# Patient Record
Sex: MALE | Race: WHITE | NOT HISPANIC OR LATINO | Employment: OTHER | URBAN - METROPOLITAN AREA
[De-identification: names, ages, dates, MRNs, and addresses within clinical notes are randomized per-mention and may not be internally consistent; named-entity substitution may affect disease eponyms.]

---

## 2017-01-13 RX ORDER — ATORVASTATIN CALCIUM 40 MG/1
TABLET, FILM COATED ORAL
Qty: 90 TABLET | Refills: 12 | Status: SHIPPED | OUTPATIENT
Start: 2017-01-13 | End: 2017-09-22 | Stop reason: SDUPTHER

## 2017-01-13 RX ORDER — LORAZEPAM 1 MG/1
TABLET ORAL
Qty: 90 TABLET | Refills: 0 | Status: SHIPPED | OUTPATIENT
Start: 2017-01-13 | End: 2017-04-12 | Stop reason: SDUPTHER

## 2017-01-16 RX ORDER — LISINOPRIL 10 MG/1
10 TABLET ORAL DAILY
Qty: 90 TABLET | Refills: 0 | Status: SHIPPED | OUTPATIENT
Start: 2017-01-16 | End: 2017-09-22 | Stop reason: SDUPTHER

## 2017-01-16 NOTE — TELEPHONE ENCOUNTER
----- Message from Ebony Richards sent at 1/16/2017 12:32 PM CST -----  Contact: Galina/ [facility]  Refill request: lisinopril 10 MG tablet    Contact Galina [phone]    Thanks

## 2017-01-18 ENCOUNTER — TELEPHONE (OUTPATIENT)
Dept: FAMILY MEDICINE | Facility: CLINIC | Age: 82
End: 2017-01-18

## 2017-01-18 NOTE — TELEPHONE ENCOUNTER
----- Message from Tricia Anderson sent at 1/17/2017  3:54 PM CST -----  Contact: self  Pt is requesting a call back concerning a request for a script. [phone]

## 2017-02-08 ENCOUNTER — OFFICE VISIT (OUTPATIENT)
Dept: OPTOMETRY | Facility: CLINIC | Age: 82
End: 2017-02-08
Payer: MEDICARE

## 2017-02-08 DIAGNOSIS — T26.62XA: Primary | ICD-10-CM

## 2017-02-08 PROCEDURE — 99213 OFFICE O/P EST LOW 20 MIN: CPT | Mod: PBBFAC,PO | Performed by: OPTOMETRIST

## 2017-02-08 PROCEDURE — 92012 INTRM OPH EXAM EST PATIENT: CPT | Mod: S$PBB,,, | Performed by: OPTOMETRIST

## 2017-02-08 PROCEDURE — 99999 PR PBB SHADOW E&M-EST. PATIENT-LVL III: CPT | Mod: PBBFAC,,, | Performed by: OPTOMETRIST

## 2017-02-08 RX ORDER — ERYTHROMYCIN 5 MG/G
OINTMENT OPHTHALMIC 4 TIMES DAILY
Qty: 1 TUBE | Refills: 0 | Status: SHIPPED | OUTPATIENT
Start: 2017-02-08 | End: 2017-02-15

## 2017-02-08 NOTE — PROGRESS NOTES
HPI     [facility]    Pt states he got diluted bleach in his eye this morning in the OS. Sprayed   out of pressurized sprayer.  Pt states on a pain scale is about a 3 or 4   right now. Pt states he rinsed his OS with water to try to rinse it out.    Pt c/o burning and FB sensation to the OS. Pt states vision is a little   blurry in OS.        Last edited by Luc Leon, OD on 2/8/2017 12:43 PM.         Assessment /Plan     For exam results, see Encounter Report.    Chemical burn of cornea, left, initial encounter  -     erythromycin (ROMYCIN) ophthalmic ointment; Place into the left eye 4 (four) times daily.  Dispense: 1 Tube; Refill: 0  -Flushed in office with entire bottle of BSS  -Erythromycin in Office  -Erythromycin QID x 1wk    RTC 1 yr or prn

## 2017-02-08 NOTE — MR AVS SNAPSHOT
[facility]  [address]  [place] [address]  Phone: [phone]  Fax: [phone]                  Aleks Brown   2017 1:00 PM   Office Visit    Description:  Male : 1932   Provider:  Luc Leon OD   Department:  [facility]           Reason for Visit     Eye Problem           Diagnoses this Visit        Comments    Chemical burn of cornea, left, initial encounter    -  Primary            To Do List           Future Appointments        Provider Department Dept Phone    2017 1:00 PM Luc Leon OD [facility] [phone]      Goals (5 Years of Data)     None       These Medications        Disp Refills Start End    erythromycin (ROMYCIN) ophthalmic ointment 1 Tube 0 2017 2/15/2017    Place into the left eye 4 (four) times daily. - Left Eye    [place] [facility] [place] [address] [facility] #: [phone]         [facility]     [facility] On Call Nurse Care Line -  Assistance  Registered nurses in the [facility] provide clinical advisement, health education, appointment booking, and other advisory services.  Call for this free service at [phone].             Medications           Message regarding Medications     Verify the changes and/or additions to your medication regime listed below are the same as discussed with your clinician today.  If any of these changes or additions are incorrect, please notify your healthcare provider.        START taking these NEW medications        Refills    erythromycin (ROMYCIN) ophthalmic ointment 0    Sig: Place into the left eye 4 (four) times daily.    Class: Normal    Route: Left Eye           Verify that the below list of medications is an accurate representation of the medications you are currently taking.  If none reported, the list may be blank. If incorrect, please contact your healthcare provider. Carry this list with you in case  of emergency.           Current Medications     aspirin (ASPIRIN LOW DOSE) 81 MG EC tablet Take 81 mg by mouth once daily.     atorvastatin (LIPITOR) 40 MG tablet TAKE 1 TABLET BY MOUTH EVERY DAY    clotrimazole-betamethasone 1-0.05% (LOTRISONE) cream Apply topically 2 (two) times daily as needed. Apply to affected area    desonide (DESOWEN) 0.05 % cream APPLY TO SCALY AREA(S) ON FACE TWICE A DAY    guaifenesin (MUCINEX) 1,200 mg Ta12 Take 1 tablet by mouth 2 (two) times daily.    lisinopril 10 MG tablet Take 1 tablet (10 mg total) by mouth once daily.    lorazepam (ATIVAN) 1 MG tablet TAKE 1 TABLET BY MOUTH EVERY EVENING AS NEEDED    metoprolol succinate (TOPROL-XL) 100 MG 24 hr tablet TAKE 1 TABLET BY MOUTH EVERY DAY    MULTIVITAMIN ORAL Take 1 tablet by mouth once daily.     nitroGLYCERIN (NITROSTAT) 0.4 MG SL tablet Place 1 tablet under the tongue as needed.     sildenafil (VIAGRA) 100 MG tablet Take 1 tablet by mouth.    erythromycin (ROMYCIN) ophthalmic ointment Place into the left eye 4 (four) times daily.           Clinical Reference Information           Allergies as of 2/8/2017     Iodine And Iodide Containing Products      Immunizations Administered on Date of Encounter - 2/8/2017     None      [facility] Sign-Up     Activating your [facility] account is as easy as 1-2-3!     1) Visit [email or web address], select Sign Up Now, enter this activation code and your date of birth, then select Next.  [email or web address]  Expires: 3/25/2017 12:46 PM      2) Create a username and password to use when you visit [facility] in the future and select a security question in case you lose your password and select Next.    3) Enter your e-mail address and click Sign Up!    Additional Information  If you have questions, please e-mail [email or web address] or call [phone] to talk to our [facility] staff. Remember, [facility] is NOT to be used for urgent needs. For medical emergencies, dial 911.         Language Assistance Services      ATTENTION: Language assistance services are available, free of charge. Please call [phone].      ATENCIÓN: Si habla karol, tiene a murray disposición servicios gratuitos de asistencia lingüística. Llame al [phone].     CHÚ Ý: N?u b?n nói Ti?ng Vi?t, có các d?ch v? h? tr? ngôn ng? mi?n phí dành cho b?n. G?i s? [phone].         [facility] complies with applicable Federal civil rights laws and does not discriminate on the basis of race, color, national origin, age, disability, or sex.

## 2017-03-04 ENCOUNTER — HOSPITAL ENCOUNTER (EMERGENCY)
Facility: OTHER | Age: 82
Discharge: HOME | End: 2017-03-04
Attending: EMERGENCY MEDICINE
Payer: MEDICARE

## 2017-03-04 VITALS
HEART RATE: 58 BPM | SYSTOLIC BLOOD PRESSURE: 157 MMHG | DIASTOLIC BLOOD PRESSURE: 64 MMHG | HEIGHT: 72 IN | TEMPERATURE: 97 F | OXYGEN SATURATION: 99 % | BODY MASS INDEX: 30.61 KG/M2 | WEIGHT: 226 LBS | RESPIRATION RATE: 20 BRPM

## 2017-03-04 DIAGNOSIS — L29.9 ITCHING: Primary | ICD-10-CM

## 2017-03-04 PROCEDURE — 99283 EMERGENCY DEPT VISIT LOW MDM: CPT

## 2017-03-04 NOTE — ED NOTES
Appearance:  Pt awake, alert & oriented to person, place & time.  Pt in no acute distress at present time.  Skin:  Skin warm, dry & intact.  Mucous membranes moist.  Skin turgor normal.   No redness or tenderness to chest wall.   Respiratory:  Respirations even, non-labored.

## 2017-03-04 NOTE — ED PROVIDER NOTES
Encounter Date: 3/4/2017       History     Chief Complaint   Patient presents with    Wound Check     Review of patient's allergies indicates:   Allergen Reactions    Iodine and iodide containing products Anaphylaxis     HPI Comments: 84-year-old male with a history of having cardiac bypass surgery in 1988 with subsequent episodes of cellulitis to his chest, reports itching to his anterior chest last night.  He reports he put Lotrisone on it and it is improved but he wanted to get checked out due to his several episodes of saline as in the past.  No fever, no tenderness, no redness, no injury.    Patient is a 84 y.o. male presenting with the following complaint: rash. The history is provided by the patient.   Rash    This is a new problem. The current episode started yesterday. The problem has been unchanged. The problem is associated with nothing. The rash is present on the trunk.     Past Medical History:   Diagnosis Date    Allergy     seasonal    Arthritis     Basal cell carcinoma 10 years    right ear     CAD (coronary artery disease) 11/29/2012    Hyperlipidemia     Hypertension     Joint pain      Past Surgical History:   Procedure Laterality Date    CATARACT EXTRACTION      CORONARY ARTERY BYPASS GRAFT       Family History   Problem Relation Age of Onset    Melanoma Neg Hx     Skin cancer Neg Hx     Psoriasis Neg Hx     Lupus Neg Hx     Eczema Neg Hx     Acne Neg Hx     Glaucoma Neg Hx     Macular degeneration Neg Hx      Social History   Substance Use Topics    Smoking status: Former Smoker    Smokeless tobacco: Never Used    Alcohol use Yes      Comment: Red wine daily     Review of Systems   Constitutional: Negative for chills and fever.   Skin: Positive for rash. Negative for color change and wound.       Physical Exam   Initial Vitals   BP Pulse Resp Temp SpO2   03/04/17 1008 03/04/17 1008 03/04/17 1008 03/04/17 1008 03/04/17 1008   157/64 58 20 97.2 °F (36.2 °C) 99 %     Physical  Exam    Nursing note and vitals reviewed.  Constitutional: He appears well-developed and well-nourished. He is cooperative.   HENT:   Head: Normocephalic and atraumatic.   Cardiovascular: Normal rate, regular rhythm and normal heart sounds.   Pulmonary/Chest: Effort normal and breath sounds normal. He exhibits no tenderness, no bony tenderness, no crepitus, no edema and no swelling.   Neurological: He is alert.   Skin: Skin is warm and dry. Rash noted.   Past erythematous macules to the anterior chest.  Several keratosis lesions to the anterior chest and neck.  No evidence of cellulitis, induration, tenderness to the skin or chest wall.         ED Course   Procedures  Labs Reviewed - No data to display            at this time, the patient has no evidence of cellulitis or any other type of skin infection to the chest wall.  I advised him to check his temperature twice a day and to look at the lesion in the mirror at least twice daily for changes in the follow-up with his primary care if any worsening of condition occurs.                 ED Course     Clinical Impression:   The encounter diagnosis was Itching.    Disposition:   Disposition: Discharged  Condition: Stable       Corin Hollingsworth MD  03/04/17 7486

## 2017-03-04 NOTE — ED AVS SNAPSHOT
[facility] EMERGENCY DEPARTMENT  [address] [place] [address]               Aleks Wilksx   3/4/2017 10:03 AM   ED    Description:  Male : 1932   Department:  [facility] Emergency Department           Your Care was Coordinated By:     Provider Role From To    Corin Hollingsworth MD Attending Provider 17 1122 --      Reason for Visit     Wound Check           Diagnoses this Visit        Comments    Itching    -  Primary       ED Disposition     ED Disposition Condition Comment    Discharge             To Do List           Follow-up Information     Follow up with Roman Dejesus Jr, MD. Schedule an appointment as soon as possible for a visit in 3 days.    Specialty:  Internal Medicine    Why:  For recheck if symptoms fail to improve or resolve    Contact information:    [place] [address]  [phone]        [facility]     [facility] Care [facility] -  Assistance  Registered nurses in the [facility] provide clinical advisement, health education, appointment booking, and other advisory services.  Call for this free service at [phone].             Medications           Message regarding Medications     Verify the changes and/or additions to your medication regime listed below are the same as discussed with your clinician today.  If any of these changes or additions are incorrect, please notify your healthcare provider.             Verify that the below list of medications is an accurate representation of the medications you are currently taking.  If none reported, the list may be blank. If incorrect, please contact your healthcare provider. Carry this list with you in case of emergency.           Current Medications     aspirin (ASPIRIN LOW DOSE) 81 MG EC tablet Take 81 mg by mouth once daily.     atorvastatin (LIPITOR) 40 MG tablet TAKE 1 TABLET BY MOUTH EVERY DAY    clotrimazole-betamethasone 1-0.05% (LOTRISONE) cream Apply topically 2  (two) times daily as needed. Apply to affected area    desonide (DESOWEN) 0.05 % cream APPLY TO SCALY AREA(S) ON FACE TWICE A DAY    guaifenesin (MUCINEX) 1,200 mg Ta12 Take 1 tablet by mouth 2 (two) times daily.    lisinopril 10 MG tablet Take 1 tablet (10 mg total) by mouth once daily.    lorazepam (ATIVAN) 1 MG tablet TAKE 1 TABLET BY MOUTH EVERY EVENING AS NEEDED    metoprolol succinate (TOPROL-XL) 100 MG 24 hr tablet TAKE 1 TABLET BY MOUTH EVERY DAY    MULTIVITAMIN ORAL Take 1 tablet by mouth once daily.     nitroGLYCERIN (NITROSTAT) 0.4 MG SL tablet Place 1 tablet under the tongue as needed.     sildenafil (VIAGRA) 100 MG tablet Take 1 tablet by mouth.           Clinical Reference Information           Your Vitals Were     BP Pulse Temp Resp Height Weight    157/64 58 97.2 °F (36.2 °C) (Temporal) 20 6' (1.829 m) 102.5 kg (226 lb)    SpO2 BMI             99% 30.65 kg/m2         Allergies as of 3/4/2017        Reactions    Iodine And Iodide Containing Products Anaphylaxis      Immunizations Administered on Date of Encounter - 3/4/2017     None      ED Micro, Lab, POCT     None      ED Imaging Orders     None        Discharge Instructions       You may continue to use the Lotrisone to area twice daily as needed.        Discharge References/Attachments     CELLULITIS, DISCHARGE INSTRUCTIONS FOR (ENGLISH)      [facility] Sign-Up     Activating your [facility] account is as easy as 1-2-3!     1) Visit [email or web address], select Sign Up Now, enter this activation code and your date of birth, then select Next.  [email or web address]  Expires: 3/25/2017 12:46 PM      2) Create a username and password to use when you visit [facility] in the future and select a security question in case you lose your password and select Next.    3) Enter your e-mail address and click Sign Up!    Additional Information  If you have questions, please e-mail [email or web address] or call [phone] to talk to our [facility] staff. Remember, [facility]  is NOT to be used for urgent needs. For medical emergencies, dial 911.         Smoking Cessation     If you would like to quit smoking:   You may be eligible for free services if you are a [place] resident and started smoking cigarettes before September 1, 1988.  Call the Smoking Cessation Trust (SCT) toll free at (282) 897-0740 or (217) 003-1155.   Call 1-800-QUIT-NOW if you do not meet the above criteria.             [facility] Emergency Department complies with applicable Federal civil rights laws and does not discriminate on the basis of race, color, national origin, age, disability, or sex.        Language Assistance Services     ATTENTION: Language assistance services are available, free of charge. Please call [phone].      ATENCIÓN: Si habla karol, tiene a murray disposición servicios gratuitos de asistencia lingüística. Llame al [phone].     CHÚ Ý: N?u b?n nói Ti?ng Vi?t, có các d?ch v? h? tr? ngôn ng? mi?n phí dành cho b?n. G?i s? [phone].

## 2017-04-12 RX ORDER — METOPROLOL SUCCINATE 100 MG/1
TABLET, EXTENDED RELEASE ORAL
Qty: 90 TABLET | Refills: 1 | Status: SHIPPED | OUTPATIENT
Start: 2017-04-12 | End: 2017-09-22 | Stop reason: SDUPTHER

## 2017-04-12 RX ORDER — LORAZEPAM 1 MG/1
TABLET ORAL
Qty: 90 TABLET | Refills: 1 | Status: SHIPPED | OUTPATIENT
Start: 2017-04-12 | End: 2017-09-22

## 2017-06-01 ENCOUNTER — OFFICE VISIT (OUTPATIENT)
Dept: FAMILY MEDICINE | Facility: CLINIC | Age: 82
End: 2017-06-01
Payer: MEDICARE

## 2017-06-01 VITALS
HEART RATE: 61 BPM | TEMPERATURE: 98 F | DIASTOLIC BLOOD PRESSURE: 62 MMHG | SYSTOLIC BLOOD PRESSURE: 142 MMHG | HEIGHT: 72 IN | OXYGEN SATURATION: 95 % | BODY MASS INDEX: 31.32 KG/M2 | WEIGHT: 231.25 LBS

## 2017-06-01 DIAGNOSIS — M25.511 ACUTE PAIN OF RIGHT SHOULDER: Primary | ICD-10-CM

## 2017-06-01 PROCEDURE — 99999 PR PBB SHADOW E&M-EST. PATIENT-LVL III: CPT | Mod: PBBFAC,,, | Performed by: NURSE PRACTITIONER

## 2017-06-01 PROCEDURE — 99213 OFFICE O/P EST LOW 20 MIN: CPT | Mod: PBBFAC,PO | Performed by: NURSE PRACTITIONER

## 2017-06-01 PROCEDURE — 99213 OFFICE O/P EST LOW 20 MIN: CPT | Mod: S$PBB,,, | Performed by: NURSE PRACTITIONER

## 2017-06-01 RX ORDER — DESONIDE 0.5 MG/G
CREAM TOPICAL
Qty: 60 G | Refills: 11 | Status: SHIPPED | OUTPATIENT
Start: 2017-06-01 | End: 2018-11-05 | Stop reason: SDUPTHER

## 2017-06-01 RX ORDER — CLOTRIMAZOLE AND BETAMETHASONE DIPROPIONATE 10; .64 MG/G; MG/G
CREAM TOPICAL 2 TIMES DAILY PRN
Qty: 45 G | Refills: 11 | Status: SHIPPED | OUTPATIENT
Start: 2017-06-01 | End: 2018-11-05 | Stop reason: SDUPTHER

## 2017-06-01 RX ORDER — NAPROXEN 375 MG/1
375 TABLET ORAL 2 TIMES DAILY WITH MEALS
Qty: 60 TABLET | Refills: 0 | Status: SHIPPED | OUTPATIENT
Start: 2017-06-01 | End: 2017-07-01

## 2017-06-01 RX ORDER — TIZANIDINE 2 MG/1
4 TABLET ORAL EVERY 6 HOURS PRN
Qty: 60 TABLET | Refills: 0 | Status: SHIPPED | OUTPATIENT
Start: 2017-06-01 | End: 2017-06-11

## 2017-06-01 NOTE — PROGRESS NOTES
"This dictation has been generated using Dragon Dictation some phonetic errors may occur.     Aleks was seen today for shoulder pain.    Diagnoses and all orders for this visit:    Acute pain of right shoulder    Other orders  -     naproxen (NAPROSYN) 375 MG tablet; Take 1 tablet (375 mg total) by mouth 2 (two) times daily with meals.  -     tizanidine (ZANAFLEX) 2 MG tablet; Take 2 tablets (4 mg total) by mouth every 6 (six) hours as needed.  -     desonide (DESOWEN) 0.05 % cream; APPLY TO SCALY AREA(S) ON FACE TWICE A DAY  -     clotrimazole-betamethasone 1-0.05% (LOTRISONE) cream; Apply topically 2 (two) times daily as needed. Apply to affected area      Right shoulder pain.  Suspect muscle involvement only.  Doubtful rotator cuff tear.  Conservative therapy with anti-inflammatory medicine and muscle relaxer as above.  Rash to face requests refill    Return if symptoms worsen or fail to improve.      ________________________________________________________________  ________________________________________________________________        Chief Complaint   Patient presents with    Shoulder Pain     History of present illness  This 84 y.o. presents today for complaint of right shoulder pain.  Symptoms have been present for 3 days.  He notes sudden onset.  Patient denies history of injury.  He notes elevation of the arm exacerbates the pain.  Patient indicates posterior shoulder.  He is right-handed.  He denies any numbness in the hand.  He denies excessive overhead work.  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  Review of systems  No fever or chills  No rash  Denies neck pain    Past medical and social history reviewed.  Patient due to me.  Follows with [facility].  Takes care of his wife who has had a stroke but does not do any lifting.    Past Medical History:   Diagnosis Date    Allergy     " seasonal    Arthritis     Basal cell carcinoma 10 years    right ear     CAD (coronary artery disease) 11/29/2012    Hyperlipidemia     Hypertension     Joint pain        Past Surgical History:   Procedure Laterality Date    CATARACT EXTRACTION      CORONARY ARTERY BYPASS GRAFT         Family History   Problem Relation Age of Onset    Melanoma Neg Hx     Skin cancer Neg Hx     Psoriasis Neg Hx     Lupus Neg Hx     Eczema Neg Hx     Acne Neg Hx     Glaucoma Neg Hx     Macular degeneration Neg Hx        Social History     Social History    Marital status:      Spouse name: N/A    Number of children: N/A    Years of education: N/A     Occupational History    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  .  Six kids. [place] [employer] [place]  [employer]         Social History Main Topics    Smoking status: Former Smoker    Smokeless tobacco: Never Used    Alcohol use Yes      Comment: Red wine daily    Drug use: No    Sexual activity: Not Asked     Other Topics Concern    None     Social History Narrative     x 60 yr.   [employer] [place]         Current Outpatient Prescriptions   Medication Sig Dispense Refill    aspirin (ASPIRIN LOW DOSE) 81 MG EC tablet Take 81 mg by mouth once daily.       atorvastatin (LIPITOR) 40 MG tablet TAKE 1 TABLET BY MOUTH EVERY DAY 90 tablet 12    clotrimazole-betamethasone 1-0.05% (LOTRISONE) cream Apply topically 2 (two) times daily as needed. Apply to affected area 45 g 11    desonide (DESOWEN) 0.05 % cream APPLY TO SCALY AREA(S) ON FACE TWICE A DAY 60 g 11    guaifenesin (MUCINEX) 1,200 mg Ta12 Take 1 tablet by mouth 2 (two) times daily. 14 tablet 0    lisinopril 10 MG tablet Take 1 tablet (10 mg total) by mouth once daily. 90 tablet 0    lorazepam (ATIVAN) 1 MG tablet TAKE 1 TABLET BY MOUTH EVERY EVENING AS NEEDED 90 tablet 1    metoprolol succinate (TOPROL-XL) 100 MG 24 hr tablet TAKE 1 TABLET BY MOUTH EVERY DAY 90 tablet 1    MULTIVITAMIN ORAL Take  1 tablet by mouth once daily.       nitroGLYCERIN (NITROSTAT) 0.4 MG SL tablet Place 1 tablet under the tongue as needed.       sildenafil (VIAGRA) 100 MG tablet Take 1 tablet by mouth.      naproxen (NAPROSYN) 375 MG tablet Take 1 tablet (375 mg total) by mouth 2 (two) times daily with meals. 60 tablet 0    tizanidine (ZANAFLEX) 2 MG tablet Take 2 tablets (4 mg total) by mouth every 6 (six) hours as needed. 60 tablet 0     No current facility-administered medications for this visit.        Review of patient's allergies indicates:   Allergen Reactions    Iodine and iodide containing products Anaphylaxis       Physical examination  Vitals Reviewed  Gen. Well-dressed well-nourished no apparent distress  Skin warm dry and intact.  No rashes noted.  HEENT.  TM intact bilateral with normal light reflex.  No mastoid tenderness during percussion.  Nares patent bilateral.  Pharynx is unremarkable.  No maxillary or frontal sinus tenderness when percussed.    Neck is supple without adenopathy  Chest.  Respirations are even unlabored.  Lungs are clear to auscultation.  Cardiac regular rate and rhythm.  No chest wall adenopathy noted.  Neuro. Awake alert oriented x4.  Normal judgment and cognition noted.  Extremities no clubbing cyanosis or edema noted.  Right shoulder decreased range of motion.  Patient has pain with elevation.  He is able to internally and externally rotate the shoulder without pain.  He doesn't have any lateral or anterior shoulder pain.  He does have palpable spasms of the supraspinatus and infraspinatus muscles.  I don't appreciate any bursa tenderness.    Call or return to clinic prn if these symptoms worsen or fail to improve as anticipated.

## 2017-06-09 ENCOUNTER — OFFICE VISIT (OUTPATIENT)
Dept: DERMATOLOGY | Facility: CLINIC | Age: 82
End: 2017-06-09
Payer: MEDICARE

## 2017-06-09 DIAGNOSIS — L57.0 AK (ACTINIC KERATOSIS): ICD-10-CM

## 2017-06-09 DIAGNOSIS — L82.1 SK (SEBORRHEIC KERATOSIS): ICD-10-CM

## 2017-06-09 DIAGNOSIS — Z85.828 HISTORY OF NONMELANOMA SKIN CANCER: ICD-10-CM

## 2017-06-09 DIAGNOSIS — R23.8 VENOUS LAKE: ICD-10-CM

## 2017-06-09 DIAGNOSIS — D48.9 NEOPLASM OF UNCERTAIN BEHAVIOR: Primary | ICD-10-CM

## 2017-06-09 PROCEDURE — 17000 DESTRUCT PREMALG LESION: CPT | Mod: S$PBB,,, | Performed by: DERMATOLOGY

## 2017-06-09 PROCEDURE — 88342 IMHCHEM/IMCYTCHM 1ST ANTB: CPT | Mod: 26,,, | Performed by: PATHOLOGY

## 2017-06-09 PROCEDURE — 1126F AMNT PAIN NOTED NONE PRSNT: CPT | Mod: ,,, | Performed by: DERMATOLOGY

## 2017-06-09 PROCEDURE — 17003 DESTRUCT PREMALG LES 2-14: CPT | Mod: PBBFAC | Performed by: DERMATOLOGY

## 2017-06-09 PROCEDURE — 99213 OFFICE O/P EST LOW 20 MIN: CPT | Mod: 25,S$PBB,, | Performed by: DERMATOLOGY

## 2017-06-09 PROCEDURE — 11100 PR BIOPSY OF SKIN LESION: CPT | Mod: 59,S$PBB,, | Performed by: DERMATOLOGY

## 2017-06-09 PROCEDURE — 17003 DESTRUCT PREMALG LES 2-14: CPT | Mod: S$PBB,,, | Performed by: DERMATOLOGY

## 2017-06-09 PROCEDURE — 88341 IMHCHEM/IMCYTCHM EA ADD ANTB: CPT | Mod: 26,,, | Performed by: PATHOLOGY

## 2017-06-09 PROCEDURE — 88313 SPECIAL STAINS GROUP 2: CPT | Mod: 26,,, | Performed by: PATHOLOGY

## 2017-06-09 PROCEDURE — 99212 OFFICE O/P EST SF 10 MIN: CPT | Mod: PBBFAC,25 | Performed by: DERMATOLOGY

## 2017-06-09 PROCEDURE — 88305 TISSUE EXAM BY PATHOLOGIST: CPT | Performed by: PATHOLOGY

## 2017-06-09 PROCEDURE — 99999 PR PBB SHADOW E&M-EST. PATIENT-LVL II: CPT | Mod: PBBFAC,,, | Performed by: DERMATOLOGY

## 2017-06-09 PROCEDURE — 17000 DESTRUCT PREMALG LESION: CPT | Mod: PBBFAC | Performed by: DERMATOLOGY

## 2017-06-09 PROCEDURE — 11100 PR BIOPSY OF SKIN LESION: CPT | Mod: 59,PBBFAC | Performed by: DERMATOLOGY

## 2017-06-09 PROCEDURE — 1159F MED LIST DOCD IN RCRD: CPT | Mod: ,,, | Performed by: DERMATOLOGY

## 2017-06-09 NOTE — PATIENT INSTRUCTIONS
Shave Biopsy Wound Care    Your doctor has performed a shave biopsy today.  A band aid and vaseline ointment has been placed over the site.  This should remain in place for 24 hours.  It is recommended that you keep the area dry for the first 24 hours.  After 24 hours, you may remove the band aid and wash the area with warm soap and water and apply Vaseline jelly.  Many patients prefer to use Neosporin or Bacitracin ointment.  This is acceptable; however, know that you can develop an allergy to this medication even if you have used it safely for years.  It is important to keep the area moist.  Letting it dry out and get air slows healing time, and will worsen the scar.  Band aid is optional after first 24 hours.      If you notice increasing redness, tenderness, pain, or yellow drainage at the biopsy site, please notify your doctor.  These are signs of an infection.    If your biopsy site is bleeding, apply firm pressure for 15 minutes straight.  Repeat for another 15 minutes, if it is still bleeding.   If the surgical site continues to bleed, then please contact your doctor.      [facility] [place] [address]/ [phone] FAX/ [email or web address]      CRYOSURGERY      Your doctor has used a method called cryosurgery to treat your skin condition. Cryosurgery refers to the use of very cold substances to treat a variety of skin conditions such as warts, pre-skin cancers, molluscum contagiosum, sun spots, and several benign growths. The substance we use in cryosurgery is liquid nitrogen and is so cold (-195 degrees Celsius) that is burns when administered.     Following treatment in the office, the skin may immediately burn and become red. You may find the area around the lesion is affected as well. It is sometimes necessary to treat not only the lesion, but a small area of the surrounding normal skin to achieve a good response.     A blister, and even a blood filled blister, may form  after treatment.   This is a normal response. If the blister is painful, it is acceptable to sterilize a needle and with rubbing alcohol and gently pop the blister. It is important that you gently wash the area with soap and warm water as the blister fluid may contain wart virus if a wart was treated. Do no remove the roof of the blister.     The area treated can take anywhere from 1-3 weeks to heal. Healing time depends on the kind skin lesion treated, the location, and how aggressively the lesion was treated. It is recommended that the areas treated are covered with Vaseline or bacitracin ointment and a band-aid. If a band-aid is not practical, just ointment applied several times per day will do. Keeping these areas moist will speed the healing time.    Treatment with liquid nitrogen can leave a scar. In dark skin, it may be a light or dark scar, in light skin it may be a white or pink scar. These will generally fade with time, but may never go away completely.     If you have any concerns after your treatment, please feel free to call the office.       [facility] [place] [address]/ [phone] FAX/ [email or web address]

## 2017-06-09 NOTE — PROGRESS NOTES
Subjective:       Patient ID:  Aleks Brown is a 84 y.o. male who presents for   Chief Complaint   Patient presents with    Skin Check     UBSE    Lesion     left ear     History of Present Illness: The patient presents for follow up of skin check.    The patient was last seen on: 11/28/2016 for cryosurgery to actinic keratoses which have resolved.   This is a high risk patient here to check for the development of new lesions.    Other skin complaints: new lesion to left ear      Patient complains of lesion(s)  Location: left ear  Duration: 2month  Symptoms: bleeding and scales  Relieving factors/Previous treatments: none        Lesion         Review of Systems   Skin: Positive for wears hat. Negative for daily sunscreen use, activity-related sunscreen use and recent sunburn.   Hematologic/Lymphatic: Bruises/bleeds easily (on asa).        Objective:    Physical Exam   Constitutional: He appears well-developed and well-nourished. No distress.   Neurological: He is alert and oriented to person, place, and time. He is not disoriented.   Psychiatric: He has a normal mood and affect.   Skin:   Areas Examined (abnormalities noted in diagram):   Scalp / Hair Palpated and Inspected  Head / Face Inspection Performed  Neck Inspection Performed  Chest / Axilla Inspection Performed  Back Inspection Performed  RUE Inspected  LUE Inspection Performed                   Diagram Legend     Erythematous scaling macule/papule c/w actinic keratosis       Vascular papule c/w angioma      Pigmented verrucoid papule/plaque c/w seborrheic keratosis      Yellow umbilicated papule c/w sebaceous hyperplasia      Irregularly shaped tan macule c/w lentigo     1-2 mm smooth white papules consistent with Milia      Movable subcutaneous cyst with punctum c/w epidermal inclusion cyst      Subcutaneous movable cyst c/w pilar cyst      Firm pink to brown papule c/w dermatofibroma      Pedunculated fleshy papule(s) c/w skin tag(s)       Evenly pigmented macule c/w junctional nevus     Mildly variegated pigmented, slightly irregular-bordered macule c/w mildly atypical nevus      Flesh colored to evenly pigmented papule c/w intradermal nevus       Pink pearly papule/plaque c/w basal cell carcinoma      Erythematous hyperkeratotic cursted plaque c/w SCC      Surgical scar with no sign of skin cancer recurrence      Open and closed comedones      Inflammatory papules and pustules      Verrucoid papule consistent consistent with wart     Erythematous eczematous patches and plaques     Dystrophic onycholytic nail with subungual debris c/w onychomycosis     Umbilicated papule    Erythematous-base heme-crusted tan verrucoid plaque consistent with inflamed seborrheic keratosis     Erythematous Silvery Scaling Plaque c/w Psoriasis     See annotation          Assessment / Plan:      Pathology Orders:      Normal Orders This Visit    Tissue Specimen To Pathology, Dermatology     Questions:    Directional Terms:  Other(comment)    Clinical information:  r/o scc vs other    Specific Site:  right cheek        Neoplasm of uncertain behavior  -     Tissue Specimen To Pathology, Dermatology  Shave biopsy procedure note:    Shave biopsy performed after verbal consent including risk of infection, scar, recurrence, need for additional treatment of site. Area prepped with alcohol, anesthetized with approximately 1.0cc of 1% lidocaine with epinephrine. Lesional tissue shaved with razor blade. Hemostasis achieved with application of aluminum chloride followed by hyfrecation. No complications. Dressing applied. Wound care explained.    If biopsy positive, schedule procedure for definitive excision.     AK (actinic keratosis)  Cryosurgery Procedure Note    Verbal consent from the patient is obtained and the patient is aware of the precancerous quality and need for treatment of these lesions. Liquid nitrogen cryosurgery is applied to the 5 actinic keratoses, as detailed in  the physical exam, to produce a freeze injury. The patient is aware that blisters may form and is instructed on wound care with gentle cleansing and use of vaseline ointment to keep moist until healed. The patient is supplied a handout on cryosurgery and is instructed to call if lesions do not completely resolve.      SK (seborrheic keratosis)  These are benign inherited growths without a malignant potential. Reassurance given to patient. No treatment is necessary.       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    Upper body skin examination performed today including at least 6 points as noted in physical examination. Suspicious lesions noted.               Return in about 6 months (around 12/9/2017).

## 2017-06-20 ENCOUNTER — INITIAL CONSULT (OUTPATIENT)
Dept: DERMATOLOGY | Facility: CLINIC | Age: 82
End: 2017-06-20
Payer: MEDICARE

## 2017-06-20 VITALS
HEART RATE: 67 BPM | DIASTOLIC BLOOD PRESSURE: 71 MMHG | WEIGHT: 231 LBS | HEIGHT: 72 IN | SYSTOLIC BLOOD PRESSURE: 148 MMHG | BODY MASS INDEX: 31.29 KG/M2

## 2017-06-20 DIAGNOSIS — C44.329 SQUAMOUS CELL CARCINOMA OF SKIN OF RIGHT CHEEK: Primary | ICD-10-CM

## 2017-06-20 PROCEDURE — 1159F MED LIST DOCD IN RCRD: CPT | Mod: ,,, | Performed by: DERMATOLOGY

## 2017-06-20 PROCEDURE — 99213 OFFICE O/P EST LOW 20 MIN: CPT | Mod: PBBFAC | Performed by: DERMATOLOGY

## 2017-06-20 PROCEDURE — 99214 OFFICE O/P EST MOD 30 MIN: CPT | Mod: S$PBB,,, | Performed by: DERMATOLOGY

## 2017-06-20 PROCEDURE — 1126F AMNT PAIN NOTED NONE PRSNT: CPT | Mod: ,,, | Performed by: DERMATOLOGY

## 2017-06-20 PROCEDURE — 99999 PR PBB SHADOW E&M-EST. PATIENT-LVL III: CPT | Mod: PBBFAC,,, | Performed by: DERMATOLOGY

## 2017-06-20 RX ORDER — VITAMIN E 268 MG
400 CAPSULE ORAL DAILY
COMMUNITY
End: 2018-10-02 | Stop reason: ALTCHOICE

## 2017-06-20 NOTE — PROGRESS NOTES
REFERRING MD:  Gillian Williamson M.D.    CHIEF COMPLAINT:  New patient being consulted for Mohs' surgery evaluation.    HISTORY OF PRESENT ILLNESS:  84 y.o. male presents with a 6 month(s) history of growth on the R cheek.    Negative for scabbing.  Positive for crusting.  Negative for bleeding.  Negative for itching.    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    No prior treatment.    Pacemaker: No  Defibrillator: No  Artificial joints: No  Artificial heart valves: No    PAST MEDICAL HISTORY:  Past Medical History:   Diagnosis Date    Allergy     seasonal    Arthritis     Basal cell carcinoma 10 years    right ear     CAD (coronary artery disease) 11/29/2012    Hyperlipidemia     Hypertension     Joint pain     Squamous cell carcinoma        PAST SURGICAL HISTORY:  Past Surgical History:   Procedure Laterality Date    CATARACT EXTRACTION      CORONARY ARTERY BYPASS GRAFT          SOCIAL HISTORY:  Dependencies:  former smoker    PERTINENT MEDICATIONS:  See medications list.  aspirin and vitamin E    ALLERGIES:  Iodine and iodide containing products    ROS:  Skin: See HPI  Constitutional: No fatigue, fever, malaise, weight loss, or night sweats.  Cardiovascular: No chest pain, palpitations, or edema.  Respiratory: No coughing, wheezing, SOB, or sputum production.    Physical Exam   HENT:   Head:             General: Mood and affect normal. Alert and orient X3. Normal appearance.  Head/Face: R inferior preauricular cheek with a 4 x 4 mm pink crusted papule located 2 cm anteriorly from the right inferior lobe attachment and 0.5 cm superiorly .  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    PLAN:  The diagnosis and the pathology report were discussed in detail with the patient. Treatment options were reviewed, including Mohs Micrographic Surgery, radiation, topical therapy, and standard excision.  After careful review of patient's history and physical exam, and after discussion of treatment options, the decision was made to perform Mohs micrographic surgery.    Scheduled patient for Mohs Micrographic Surgery. Risks, benefits, and alternatives of Mohs' surgery discussed with the patient. Discussed repair options including complex closure, skin flap, skin graft and second intention healing with the patient. Pre-operative instructions provided to the patient. Okay to stay on aspirin. Stop vitamin E a week prior to procedure.

## 2017-06-26 ENCOUNTER — PROCEDURE VISIT (OUTPATIENT)
Dept: DERMATOLOGY | Facility: CLINIC | Age: 82
End: 2017-06-26
Payer: MEDICARE

## 2017-06-26 VITALS
WEIGHT: 213 LBS | HEART RATE: 52 BPM | HEIGHT: 72 IN | SYSTOLIC BLOOD PRESSURE: 156 MMHG | DIASTOLIC BLOOD PRESSURE: 73 MMHG | BODY MASS INDEX: 28.85 KG/M2

## 2017-06-26 DIAGNOSIS — C44.329 SQUAMOUS CELL CARCINOMA OF SKIN OF RIGHT CHEEK: Primary | ICD-10-CM

## 2017-06-26 PROCEDURE — 13132 CMPLX RPR F/C/C/M/N/AX/G/H/F: CPT | Mod: 51,S$PBB,, | Performed by: DERMATOLOGY

## 2017-06-26 PROCEDURE — 17311 MOHS 1 STAGE H/N/HF/G: CPT | Mod: S$PBB,,, | Performed by: DERMATOLOGY

## 2017-06-26 PROCEDURE — 17311 MOHS 1 STAGE H/N/HF/G: CPT | Mod: PBBFAC | Performed by: DERMATOLOGY

## 2017-06-26 PROCEDURE — 13132 CMPLX RPR F/C/C/M/N/AX/G/H/F: CPT | Mod: PBBFAC | Performed by: DERMATOLOGY

## 2017-06-26 PROCEDURE — 99499 UNLISTED E&M SERVICE: CPT | Mod: S$PBB,,, | Performed by: DERMATOLOGY

## 2017-06-26 NOTE — PROGRESS NOTES
PROCEDURE: Mohs' Micrographic Surgery    INDICATION: Location in mask areas of face including central face, nose, eyelids, eyebrows, lips, chin, preauricular, temple, and ear. Biopsy-proven skin cancer of cosmetically and functionally important areas, including head, neck, genital, hand, foot, or areas known for having difficulty in healing, such as the lower anterior legs. Tumor with ill-defined borders.    REFERRING MD: Gillian Williamson M.D.    CASE NUMBER:     ANESTHETIC: 3 cc 0.5% Lidocaine with Epi 1:200,000 mixed 1:1 with 0.5% Bupivacaine    SURGICAL PREP: Hibiclens    SURGEON: Magnus Rashid MD    ASSISTANTS: Valeria Young PA-C and Kacie Dickson, Surg Tech    PREOPERATIVE DIAGNOSIS: squamous cell carcinoma    POSTOPERATIVE DIAGNOSIS: squamous cell carcinoma    PATHOLOGIC DIAGNOSIS: squamous cell carcinoma    HISTOLOGY OF SPECIMENS IN FIRST STAGE:   Tumor Type: No tumor seen.    STAGES OF MOHS' SURGERY PERFORMED: 1    TUMOR-FREE PLANE ACHIEVED: Yes    HEMOSTASIS: electrocoagulation     SPECIMENS: 2     LOCATION: right (inferior preauricular) cheek. Patient verified location.    INITIAL LESION SIZE: 0.5 x 0.5 cm    FINAL DEFECT SIZE: 1.0 x 1.3 cm    WOUND REPAIR/DISPOSITION: The patient tolerated Mohs' Micrographic Surgery for a squamous cell carcinoma very well. When the tumor was completely removed, a repair of the surgical defect was undertaken.      PROCEDURE: Complex Linear Repair    INDICATION: Status post Mohs' Micrographic Surgery for squamous cell carcinoma.    CASE NUMBER:     SURGEON: Magnus Rashid MD    ASSISTANTS: Valeria Young PA-C and Denise Fry Surg Tech    ANESTHETIC: 2 cc 0.5% Lidocaine with Epi 1:200,000 mixed 1:1 with 0.5% Bupivacaine    SURGICAL PREP: Hibiclens    LOCATION: right (inferior preauricular) cheek    DEFECT SIZE: 1.0 x 1.3 cm    WOUND REPAIR/DISPOSITION:  After the patient's carcinoma had been completely removed with Mohs' Micrographic Surgery, a repair of the  surgical defect was undertaken. The patient was returned to the operating suite where the area of right inferior preauricular cheek was prepped, draped, and anesthetized in the usual sterile fashion. The wound was widely undermined in all directions. Then, electrocoagulation was used to obtain meticulous hemostasis. 5-0 Vicryl buried vertical mattress sutures were placed into the subcutaneous and dermal plane to close the wound and maria guadalupe the cutaneous wound edge. Bilateral dog ears were identified and were removed by a standard Burow's triangle technique. The cutaneous wound edges were closed using interrupted 5-0 Prolene suture.    The patient tolerated the procedure well.    The area was cleaned and dressed appropriately and the patient was given wound care instructions, as well as appointment for follow-up evaluation.    LENGTH OF REPAIR: 2.7 cm    Vitals:    06/26/17 1209 06/26/17 1348   BP: (!) 150/68 (!) 156/73   BP Location: Right arm Right arm   Patient Position: Sitting Sitting   BP Method: Automatic Automatic   Pulse: (!) 59 (!) 52   Weight: 96.6 kg (213 lb)    Height: 6' (1.829 m)

## 2017-07-03 ENCOUNTER — OFFICE VISIT (OUTPATIENT)
Dept: DERMATOLOGY | Facility: CLINIC | Age: 82
End: 2017-07-03
Payer: MEDICARE

## 2017-07-03 DIAGNOSIS — Z09 POSTOP CHECK: Primary | ICD-10-CM

## 2017-07-03 PROCEDURE — 99024 POSTOP FOLLOW-UP VISIT: CPT | Mod: ,,, | Performed by: DERMATOLOGY

## 2017-07-03 PROCEDURE — 99999 PR PBB SHADOW E&M-EST. PATIENT-LVL III: CPT | Mod: PBBFAC,,, | Performed by: DERMATOLOGY

## 2017-07-03 PROCEDURE — 99213 OFFICE O/P EST LOW 20 MIN: CPT | Mod: PBBFAC | Performed by: DERMATOLOGY

## 2017-07-03 NOTE — PROGRESS NOTES
84 y.o. male patient is here for suture removal following Mohs' surgery.    Patient reports no problems with right (inferior preauricular) cheek.    WOUND PE:  The right (inferior preauricular) cheek sutures intact. Wound healing well. Good skin edges. No signs or symptoms of infection.    IMPRESSION:  Healing operative site from Mohs' surgery SCC, right (inferior preauricular) cheek s/p Mohs with CLC, postop day # 7.    PLAN:  Sutures removed today. Steri-strips applied.  Continue wound care.  Keep moist with Aquaphor.  Call if any concern arises.    RTC:  In 3-6 months with Gillian Williamson M.D. for skin check or sooner if new concern arises.

## 2017-07-31 ENCOUNTER — TELEPHONE (OUTPATIENT)
Dept: DERMATOLOGY | Facility: CLINIC | Age: 82
End: 2017-07-31

## 2017-07-31 NOTE — TELEPHONE ENCOUNTER
----- Message from Beti Tomlin sent at 7/31/2017  8:14 AM CDT -----  Contact: pt   PILAR-pt- pt is calling to speak with the nurse pt would like to be seen today or asap pt has a spot on his left  earlobe that bleeds and heels again pt said it heels and keeps coming back. Can you please call pt at [phone] jc

## 2017-08-03 ENCOUNTER — OFFICE VISIT (OUTPATIENT)
Dept: DERMATOLOGY | Facility: CLINIC | Age: 82
End: 2017-08-03
Payer: MEDICARE

## 2017-08-03 DIAGNOSIS — D48.9 NEOPLASM OF UNCERTAIN BEHAVIOR: Primary | ICD-10-CM

## 2017-08-03 PROCEDURE — 11100 PR BIOPSY OF SKIN LESION: CPT | Mod: PBBFAC | Performed by: DERMATOLOGY

## 2017-08-03 PROCEDURE — 99213 OFFICE O/P EST LOW 20 MIN: CPT | Mod: PBBFAC | Performed by: DERMATOLOGY

## 2017-08-03 PROCEDURE — 99499 UNLISTED E&M SERVICE: CPT | Mod: S$PBB,,, | Performed by: DERMATOLOGY

## 2017-08-03 PROCEDURE — 88305 TISSUE EXAM BY PATHOLOGIST: CPT | Performed by: PATHOLOGY

## 2017-08-03 PROCEDURE — 88305 TISSUE EXAM BY PATHOLOGIST: CPT | Mod: 26,,, | Performed by: PATHOLOGY

## 2017-08-03 PROCEDURE — 99999 PR PBB SHADOW E&M-EST. PATIENT-LVL III: CPT | Mod: PBBFAC,,, | Performed by: DERMATOLOGY

## 2017-08-03 PROCEDURE — 11100 PR BIOPSY OF SKIN LESION: CPT | Mod: S$PBB,,, | Performed by: DERMATOLOGY

## 2017-08-03 NOTE — PROGRESS NOTES
Subjective:       Patient ID:  Aleks Brown is a 84 y.o. male who presents for   Chief Complaint   Patient presents with    Lesion     left ear     History of Present Illness: The patient presents for follow up of skin check.    The patient was last seen on: 6/9/17 for cryosurgery to actinic keratoses which have resolved. And bx + scc of right cheek- S/p MOHS w/ SSW on 6/26/17  This is a high risk patient here to check for the development of new lesions.    Other skin complaints: left ear    Patient complains of lesion(s)  Location: left ear  Duration: comes and goes over 4 months  Symptoms: scabbing, bleeding- intermittent  Relieving factors/Previous treatments: antibiotic ointment- no relief            Review of Systems   Skin: Positive for wears hat. Negative for daily sunscreen use, activity-related sunscreen use and recent sunburn.   Hematologic/Lymphatic: Bruises/bleeds easily (on asa).        Objective:    Physical Exam   Constitutional: He appears well-developed and well-nourished. No distress.   Neurological: He is alert and oriented to person, place, and time. He is not disoriented.   Psychiatric: He has a normal mood and affect.   Skin:   Areas Examined (abnormalities noted in diagram):   Head / Face Inspection Performed              Diagram Legend     Erythematous scaling macule/papule c/w actinic keratosis       Vascular papule c/w angioma      Pigmented verrucoid papule/plaque c/w seborrheic keratosis      Yellow umbilicated papule c/w sebaceous hyperplasia      Irregularly shaped tan macule c/w lentigo     1-2 mm smooth white papules consistent with Milia      Movable subcutaneous cyst with punctum c/w epidermal inclusion cyst      Subcutaneous movable cyst c/w pilar cyst      Firm pink to brown papule c/w dermatofibroma      Pedunculated fleshy papule(s) c/w skin tag(s)      Evenly pigmented macule c/w junctional nevus     Mildly variegated pigmented, slightly irregular-bordered macule c/w  mildly atypical nevus      Flesh colored to evenly pigmented papule c/w intradermal nevus       Pink pearly papule/plaque c/w basal cell carcinoma      Erythematous hyperkeratotic cursted plaque c/w SCC      Surgical scar with no sign of skin cancer recurrence      Open and closed comedones      Inflammatory papules and pustules      Verrucoid papule consistent consistent with wart     Erythematous eczematous patches and plaques     Dystrophic onycholytic nail with subungual debris c/w onychomycosis     Umbilicated papule    Erythematous-base heme-crusted tan verrucoid plaque consistent with inflamed seborrheic keratosis     Erythematous Silvery Scaling Plaque c/w Psoriasis     See annotation          Assessment / Plan:      Pathology Orders:      Normal Orders This Visit    Tissue Specimen To Pathology, Dermatology     Questions:    Directional Terms:  Other(comment)    Clinical information:  r/o bcc    Specific Site:  left ear lobe        Neoplasm of uncertain behavior  -     Tissue Specimen To Pathology, Dermatology    Shave biopsy procedure note:    Shave biopsy performed after verbal consent including risk of infection, scar, recurrence, need for additional treatment of site. Area prepped with alcohol, anesthetized with approximately 1.0cc of 1% lidocaine with epinephrine. Lesional tissue shaved with razor blade. Hemostasis achieved with application of aluminum chloride followed by hyfrecation. No complications. Dressing applied. Wound care explained.    If biopsy positive for malignancy, refer to Dr. Rashid for Mohs surgery consultation.           Return in about 3 months (around 11/3/2017) for UBSE.

## 2017-08-03 NOTE — PATIENT INSTRUCTIONS
Shave Biopsy Wound Care    Your doctor has performed a shave biopsy today.  A band aid and vaseline ointment has been placed over the site.  This should remain in place for 24 hours.  It is recommended that you keep the area dry for the first 24 hours.  After 24 hours, you may remove the band aid and wash the area with warm soap and water and apply Vaseline jelly.  Many patients prefer to use Neosporin or Bacitracin ointment.  This is acceptable; however, know that you can develop an allergy to this medication even if you have used it safely for years.  It is important to keep the area moist.  Letting it dry out and get air slows healing time, and will worsen the scar.  Band aid is optional after first 24 hours.      If you notice increasing redness, tenderness, pain, or yellow drainage at the biopsy site, please notify your doctor.  These are signs of an infection.    If your biopsy site is bleeding, apply firm pressure for 15 minutes straight.  Repeat for another 15 minutes, if it is still bleeding.   If the surgical site continues to bleed, then please contact your doctor.      [facility] [place] [address]/ [phone] FAX/ [email or web address]

## 2017-08-08 ENCOUNTER — TELEPHONE (OUTPATIENT)
Dept: DERMATOLOGY | Facility: CLINIC | Age: 82
End: 2017-08-08

## 2017-08-08 NOTE — TELEPHONE ENCOUNTER
No answer. Left message for pt to call the office on voicemail regarding his bx results and scheduling Mohs surgery.

## 2017-08-08 NOTE — TELEPHONE ENCOUNTER
----- Message from Sun Son LPN sent at 8/8/2017 10:43 AM CDT -----  Contact: pt at [phone] his cell      ----- Message -----  From: Claudia Evans  Sent: 8/8/2017   9:48 AM  To: Clay Parmar pt-Pt missed a call earlier today.  Can be reached on his cell at above number.

## 2017-08-09 ENCOUNTER — TELEPHONE (OUTPATIENT)
Dept: DERMATOLOGY | Facility: CLINIC | Age: 82
End: 2017-08-09

## 2017-08-09 NOTE — TELEPHONE ENCOUNTER
----- Message from Beti Tomlin sent at 8/9/2017  8:34 AM CDT -----  Contact: gee Rashid- gee is returning the nurses call can  you please call pt at [phone] jc

## 2017-08-14 ENCOUNTER — TELEPHONE (OUTPATIENT)
Dept: DERMATOLOGY | Facility: CLINIC | Age: 82
End: 2017-08-14

## 2017-08-14 NOTE — TELEPHONE ENCOUNTER
Pt was called and schedule for Mohs surgery on 8/24/17, at 7:30. Established pt. Pt verbally confirmed appointment date and time. Appointment letter along with preoperative instruction sheet was mailed to pt.

## 2017-08-14 NOTE — TELEPHONE ENCOUNTER
----- Message from Sun Maxwell sent at 8/14/2017  2:44 PM CDT -----  Contact: patient  [phone] call above patient need to schedule surgery thanks

## 2017-08-24 ENCOUNTER — PROCEDURE VISIT (OUTPATIENT)
Dept: DERMATOLOGY | Facility: CLINIC | Age: 82
End: 2017-08-24
Payer: MEDICARE

## 2017-08-24 VITALS
DIASTOLIC BLOOD PRESSURE: 81 MMHG | BODY MASS INDEX: 28.85 KG/M2 | HEIGHT: 72 IN | HEART RATE: 46 BPM | SYSTOLIC BLOOD PRESSURE: 175 MMHG | WEIGHT: 213 LBS

## 2017-08-24 DIAGNOSIS — C44.219: Primary | ICD-10-CM

## 2017-08-24 PROCEDURE — 17312 MOHS ADDL STAGE: CPT | Mod: PBBFAC | Performed by: DERMATOLOGY

## 2017-08-24 PROCEDURE — 17311 MOHS 1 STAGE H/N/HF/G: CPT | Mod: S$PBB,,, | Performed by: DERMATOLOGY

## 2017-08-24 PROCEDURE — 17312 MOHS ADDL STAGE: CPT | Mod: S$PBB,,, | Performed by: DERMATOLOGY

## 2017-08-24 PROCEDURE — 13152 CMPLX RPR E/N/E/L 2.6-7.5 CM: CPT | Mod: S$PBB,51,, | Performed by: DERMATOLOGY

## 2017-08-24 PROCEDURE — 17311 MOHS 1 STAGE H/N/HF/G: CPT | Mod: PBBFAC | Performed by: DERMATOLOGY

## 2017-08-24 PROCEDURE — 99499 UNLISTED E&M SERVICE: CPT | Mod: S$PBB,,, | Performed by: DERMATOLOGY

## 2017-08-24 PROCEDURE — 13152 CMPLX RPR E/N/E/L 2.6-7.5 CM: CPT | Mod: PBBFAC | Performed by: DERMATOLOGY

## 2017-08-24 RX ORDER — CEPHALEXIN 500 MG/1
500 CAPSULE ORAL 3 TIMES DAILY
Qty: 21 CAPSULE | Refills: 0 | Status: SHIPPED | OUTPATIENT
Start: 2017-08-24 | End: 2017-08-31

## 2017-08-24 NOTE — PROGRESS NOTES
PROCEDURE: Mohs' Micrographic Surgery    INDICATION: Location in mask areas of face including central face, nose, eyelids, eyebrows, lips, chin, preauricular, temple, and ear. Biopsy-proven skin cancer of cosmetically and functionally important areas, including head, neck, genital, hand, foot, or areas known for having difficulty in healing, such as the lower anterior legs. Tumor with ill-defined borders.    REFERRING MD: Gillian Williamson M.D.    CASE NUMBER:     ANESTHETIC: 3.5 cc 0.5% Lidocaine with Epi 1:200,000 mixed 1:1 with 0.5% Bupivacaine    SURGICAL PREP: Betadine    SURGEON: Magnus Rashid MD    ASSISTANTS: Valeria Young PA-C and Kacie Dickson, Surg Tech    PREOPERATIVE DIAGNOSIS: basal cell carcinoma    POSTOPERATIVE DIAGNOSIS: basal cell carcinoma    PATHOLOGIC DIAGNOSIS: basal cell carcinoma- nodular, superficial    HISTOLOGY OF SPECIMENS IN FIRST STAGE:   Tumor Type: Tumor seen. Superficial basal cell carcinoma: Foci of basaloid cells with peripheral palisading and focal retraction artifact arising along the dermoepidermal junction and extending into the papillary dermis.  Nodular basal cell carcinoma: Nodular tumor in dermis composed of basaloid cells exhibiting peripheral palisading and retraction artifact.   Depth of Invasion: epidermis and dermis  Perineural Invasion: No    HISTOLOGY OF SPECIMENS IN SUBSEQUENT STAGES:  Tumor Type: Tumor seen. Same as in first stage.   Depth of Invasion: epidermis and dermis  Perineural Invasion: No    STAGES OF MOHS' SURGERY PERFORMED: 3    TUMOR-FREE PLANE ACHIEVED: Yes    HEMOSTASIS: electrocoagulation     SPECIMENS: 5 (2 in stage A, 2 in stage B and 1 in stage C)    LOCATION: left earlobe. Patient verified location.    INITIAL LESION SIZE: 0.4 x 0.5 cm    FINAL DEFECT SIZE: 1.1 x 1.7 cm    WOUND REPAIR/DISPOSITION: The patient tolerated Mohs' Micrographic Surgery for a basal cell carcinoma very well. When the tumor was completely removed, a repair of the  surgical defect was undertaken.      PROCEDURE: Complex Linear Repair    INDICATION: Status post Mohs' Micrographic Surgery for basal cell carcinoma.    CASE NUMBER:     SURGEON: Magnus Rashid MD    ASSISTANTS: Valeria Young PA-C and Kacie Dickson Surg Tech    ANESTHETIC: 3 cc 1% Lidocaine with Epinephrine 1:100,000    SURGICAL PREP: Betadine    LOCATION: left earlobe    DEFECT SIZE: 1.1 x 1.7 cm    WOUND REPAIR/DISPOSITION:  After the patient's carcinoma had been completely removed with Mohs' Micrographic Surgery, a repair of the surgical defect was undertaken. The patient was returned to the operating suite where the area of left earlobe was prepped, draped, and anesthetized in the usual sterile fashion. The wound was widely undermined in all directions. Then, electrocoagulation was used to obtain meticulous hemostasis. 4-0 Vicryl buried vertical mattress sutures were placed into the subcutaneous and dermal plane to close the wound and maria guadalupe the cutaneous wound edge. Bilateral dog ears were identified and were removed by a standard Burow's triangle technique. The cutaneous wound edges were closed using interrupted 5-0 Prolene suture.    The patient tolerated the procedure well.    The area was cleaned and dressed appropriately and the patient was given wound care instructions, as well as appointment for follow-up evaluation. Patient was placed on Keflex 500 mg TID x 7 days.    LENGTH OF REPAIR: 2.8 cm    Vitals:    08/24/17 0719 08/24/17 1034   BP: (!) 158/64 (!) 175/81   BP Location: Right arm Left arm   Patient Position: Sitting Sitting   BP Method: Small (Automatic) Small (Automatic)   Pulse: (!) 54 (!) 46   Weight: 96.6 kg (213 lb)    Height: 6' (1.829 m)

## 2017-08-31 ENCOUNTER — OFFICE VISIT (OUTPATIENT)
Dept: DERMATOLOGY | Facility: CLINIC | Age: 82
End: 2017-08-31
Payer: MEDICARE

## 2017-08-31 DIAGNOSIS — Z09 POSTOP CHECK: Primary | ICD-10-CM

## 2017-08-31 PROCEDURE — 99213 OFFICE O/P EST LOW 20 MIN: CPT | Mod: PBBFAC | Performed by: DERMATOLOGY

## 2017-08-31 PROCEDURE — 99999 PR PBB SHADOW E&M-EST. PATIENT-LVL III: CPT | Mod: PBBFAC,,, | Performed by: DERMATOLOGY

## 2017-08-31 PROCEDURE — 99024 POSTOP FOLLOW-UP VISIT: CPT | Mod: ,,, | Performed by: DERMATOLOGY

## 2017-08-31 RX ORDER — MUPIROCIN 20 MG/G
OINTMENT TOPICAL DAILY
Refills: 1 | COMMUNITY
Start: 2017-08-01 | End: 2018-10-02 | Stop reason: ALTCHOICE

## 2017-08-31 NOTE — PROGRESS NOTES
84 y.o. male patient is here for suture removal following Mohs' surgery.    Patient reports no problems.    WOUND PE:  The L earlobe sutures intact. Wound healing well. Good skin edges. No signs or symptoms of infection.      IMPRESSION:  Healing operative site from Mohs' surgery, BCC L earlobe s/p Mohs with CLC, postop day #7.    PLAN:  Sutures removed today. Steri-strips applied.  Continue wound care.  Keep moist with Aquaphor.    RTC:  In 3-6 months with Gillian Williamson M.D. for skin check or sooner if new concern arises.

## 2017-09-16 NOTE — ED TRIAGE NOTES
"Pt reports having recurrent infections " under the skin" at site of bypass surgery from 1988.  Pt states "I started feeling the same way I do with infection last night".  Pt states "I have been opened up and cleaned out several times for this".    Pt denies chest pain.  Denies fever.  Pt states he used Lotrisim cream on the site.   " Mu-ism

## 2017-09-21 NOTE — PROGRESS NOTES
This note was created by combination of typed  and Dragon dictation.  Transcription errors may be present.  If there are any questions, please contact me.    Assessment & Plan  Essential hypertension-better on repeat today.  No change.  Lisinopril and metoprolol.  Return fasting labs  -     lisinopril 10 MG tablet; Take 1 tablet (10 mg total) by mouth once daily.  Dispense: 90 tablet; Refill: 3  -     metoprolol succinate (TOPROL-XL) 100 MG 24 hr tablet; Take 1 tablet (100 mg total) by mouth once daily.  Dispense: 90 tablet; Refill: 3  -     Comprehensive metabolic panel; Future; Expected date: 09/22/2017  -     Lipid panel; Future; Expected date: 09/22/2017      Mixed hyperlipidemia  Right-sided carotid artery disease  Coronary artery disease involving native coronary artery without angina pectoris, unspecified whether native or transplanted heart-on beta blocker.  On statin.  On aspirin.  Return for fasting labs.  He notes that his cardiologist has left the practice and the patient will consider whether he wants to continue cardiology care on the [place] or transfer care here to the [facility]  -     metoprolol succinate (TOPROL-XL) 100 MG 24 hr tablet; Take 1 tablet (100 mg total) by mouth once daily.  Dispense: 90 tablet; Refill: 3  -     atorvastatin (LIPITOR) 40 MG tablet; Take 1 tablet (40 mg total) by mouth once daily.  Dispense: 90 tablet; Refill: 3  -     Comprehensive metabolic panel; Future; Expected date: 09/22/2017  -     Lipid panel; Future; Expected date: 09/22/2017    Insomnia, unspecified type-discussed that benzodiazepines are high risk medications.  He's taking lorazepam long-standing.  I'll try him with trazodone 1/4-1/2 tablet at night.  Asked him to give it a couple of weeks.  Ultimately may return back to lorazepam if he cannot tolerate  -     Discontinue: trazodone (DESYREL) 50 MG tablet; 1/4 to 1/2 tablet 1 hour before bedtime. Instead of lorazepam  Dispense: 30 tablet; Refill:  11  -     trazodone (DESYREL) 50 MG tablet; 1/4 to 1/2 tablet 1 hour before bedtime. Instead of lorazepam  Dispense: 90 tablet; Refill: 3    Need for vaccination for Strep pneumoniae  -     Pneumococcal Conjugate Vaccine (13 Valent) (IM)        Medications Discontinued During This Encounter   Medication Reason    atorvastatin (LIPITOR) 40 MG tablet Reorder    lisinopril 10 MG tablet Reorder    metoprolol succinate (TOPROL-XL) 100 MG 24 hr tablet Reorder    lorazepam (ATIVAN) 1 MG tablet     trazodone (DESYREL) 50 MG tablet Reorder       Follow-up: No Follow-up on file. if all normal physical exam 1 year      =================================================================      Chief Complaint   Patient presents with    Annual Exam       HPI  Aleks is a 84 y.o. male, last appointment with this clinic was 6/1/2017.    Former pt of Dr. Dejesus  CAD s/p CABG x 5 1998.  Was seeing Syd but no longer with [facility] and he's considering transferring here to .  [facility] 2/2011 4/5 grafts patent  Carotid artery disease last US 2015 with nonocclusive stenosis  Hypertension  Hyperlipidemia  SCC cheek  Insomnia. Lorazepam in the evening. Notes insomnia all his life.  Lorazepam longstanding.  Denies SE.  Good sleep habits no napping no caffeine after noon.  Hx of lorazepam 0.5 mg became ineffective.  We discussed high risk nature of benzodiazepines and he would be willing to try an alternative    Was going to the gym until wife developed leg pain and couldn't go any more.    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                           There is 40 - 49% left Internal Carotid stenosis.   July 2011      Hyperlipidemia 11/06/2012    Hypertension 11/06/2012    Angina pectoris syndrome 11/06/2012       PAST MEDICAL HISTORY:  Past Medical History:   Diagnosis Date    Allergy     seasonal    Arthritis     Basal cell carcinoma 10 years    right ear     CAD (coronary artery disease) 11/29/2012    Hyperlipidemia     Hypertension     Joint pain     Squamous cell carcinoma 06/26/2017       PAST SURGICAL HISTORY:  Past Surgical History:   Procedure Laterality Date    CATARACT EXTRACTION      CORONARY ARTERY BYPASS GRAFT       Family History   Problem Relation Age of Onset    Skin cancer Mother     Leukemia Father     Diabetes Sister     Peripheral vascular disease Sister     Cancer Brother     No Known Problems Daughter     No Known Problems Daughter     No Known Problems Daughter     No Known Problems Daughter     No Known Problems Son     No Known Problems Son        SOCIAL HISTORY:  Social History     Social History    Marital status:      Spouse name: N/A    Number of children: N/A    Years of education: N/A     Occupational History    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  .  Six kids. [place] vet.  [employer] [facility] [place]         Social History Main Topics    Smoking status: Former Smoker    Smokeless tobacco: Never Used    Alcohol use Yes      Comment: Red wine daily    Drug use: No    Sexual activity: Not on file     Other Topics Concern    Not on file     Social History Narrative     x 60 yr.  [place] [facility] [employer] [place]         ALLERGIES AND MEDICATIONS: updated and reviewed.  Review of patient's allergies indicates:   Allergen Reactions    Iodine and iodide containing products  Anaphylaxis     Current Outpatient Prescriptions   Medication Sig Dispense Refill    aspirin (ASPIRIN LOW DOSE) 81 MG EC tablet Take 81 mg by mouth once daily.       atorvastatin (LIPITOR) 40 MG tablet TAKE 1 TABLET BY MOUTH EVERY DAY 90 tablet 12    clotrimazole-betamethasone 1-0.05% (LOTRISONE) cream Apply topically 2 (two) times daily as needed. Apply to affected area 45 g 11    desonide (DESOWEN) 0.05 % cream APPLY TO SCALY AREA(S) ON FACE TWICE A DAY 60 g 11    guaifenesin (MUCINEX) 1,200 mg Ta12 Take 1 tablet by mouth 2 (two) times daily. 14 tablet 0    lisinopril 10 MG tablet Take 1 tablet (10 mg total) by mouth once daily. 90 tablet 0    lorazepam (ATIVAN) 1 MG tablet TAKE 1 TABLET BY MOUTH EVERY EVENING AS NEEDED 90 tablet 1    metoprolol succinate (TOPROL-XL) 100 MG 24 hr tablet TAKE 1 TABLET BY MOUTH EVERY DAY 90 tablet 1    MULTIVITAMIN ORAL Take 1 tablet by mouth once daily.       mupirocin (BACTROBAN) 2 % ointment once daily. Apply to affected area  1    nitroGLYCERIN (NITROSTAT) 0.4 MG SL tablet Place 1 tablet under the tongue as needed.       sildenafil (VIAGRA) 100 MG tablet Take 1 tablet by mouth.      vitamin E 400 UNIT capsule Take 400 Units by mouth once daily.       No current facility-administered medications for this visit.        Review of Systems   Constitutional: Negative for fever, malaise/fatigue and weight loss.   HENT: Negative for congestion.    Eyes: Negative for blurred vision and pain.   Respiratory: Negative for shortness of breath and wheezing.    Cardiovascular: Negative for chest pain, palpitations and leg swelling.   Gastrointestinal: Negative for abdominal pain, blood in stool, constipation, diarrhea and heartburn.   Genitourinary: Negative for dysuria, hematuria and urgency.   Musculoskeletal: Negative for joint pain.   Neurological: Negative for tingling, focal weakness, weakness and headaches.   Psychiatric/Behavioral: Negative for depression. The patient is  not nervous/anxious.        Physical Exam   Vitals:    09/22/17 1049 09/22/17 1115   BP: (!) 146/64 120/80   BP Location:  Left arm   Patient Position:  Sitting   BP Method:  Large (Manual)   Pulse: (!) 54    Temp: 98 °F (36.7 °C)    Weight: 103.9 kg (229 lb 0.9 oz)    Height: 6' (1.829 m)     Body mass index is 31.07 kg/m².  Weight: 103.9 kg (229 lb 0.9 oz)   Height: 6' (182.9 cm)     Physical Exam   Constitutional: He is oriented to person, place, and time. He appears well-developed and well-nourished. No distress.   Eyes: EOM are normal.   Cardiovascular: Normal rate, regular rhythm and normal heart sounds.    No murmur heard.  Mild right carotid bruit   Pulmonary/Chest: Effort normal and breath sounds normal.   Musculoskeletal: Normal range of motion.   Neurological: He is alert and oriented to person, place, and time. Coordination normal.   Skin: Skin is warm and dry.   Psychiatric: He has a normal mood and affect. His behavior is normal. Thought content normal.

## 2017-09-22 ENCOUNTER — OFFICE VISIT (OUTPATIENT)
Dept: FAMILY MEDICINE | Facility: CLINIC | Age: 82
End: 2017-09-22
Payer: MEDICARE

## 2017-09-22 VITALS
SYSTOLIC BLOOD PRESSURE: 120 MMHG | BODY MASS INDEX: 31.03 KG/M2 | HEIGHT: 72 IN | DIASTOLIC BLOOD PRESSURE: 80 MMHG | TEMPERATURE: 98 F | HEART RATE: 54 BPM | WEIGHT: 229.06 LBS

## 2017-09-22 DIAGNOSIS — I25.10 CORONARY ARTERY DISEASE INVOLVING NATIVE CORONARY ARTERY WITHOUT ANGINA PECTORIS, UNSPECIFIED WHETHER NATIVE OR TRANSPLANTED HEART: ICD-10-CM

## 2017-09-22 DIAGNOSIS — E78.2 MIXED HYPERLIPIDEMIA: ICD-10-CM

## 2017-09-22 DIAGNOSIS — I10 ESSENTIAL HYPERTENSION: Primary | ICD-10-CM

## 2017-09-22 DIAGNOSIS — I77.9 RIGHT-SIDED CAROTID ARTERY DISEASE: ICD-10-CM

## 2017-09-22 DIAGNOSIS — G47.00 INSOMNIA, UNSPECIFIED TYPE: ICD-10-CM

## 2017-09-22 DIAGNOSIS — Z23 NEED FOR VACCINATION FOR STREP PNEUMONIAE: ICD-10-CM

## 2017-09-22 PROCEDURE — 99999 PR PBB SHADOW E&M-EST. PATIENT-LVL III: CPT | Mod: PBBFAC,,, | Performed by: INTERNAL MEDICINE

## 2017-09-22 PROCEDURE — 99214 OFFICE O/P EST MOD 30 MIN: CPT | Mod: S$PBB,,, | Performed by: INTERNAL MEDICINE

## 2017-09-22 PROCEDURE — 3079F DIAST BP 80-89 MM HG: CPT | Mod: ,,, | Performed by: INTERNAL MEDICINE

## 2017-09-22 PROCEDURE — 1126F AMNT PAIN NOTED NONE PRSNT: CPT | Mod: ,,, | Performed by: INTERNAL MEDICINE

## 2017-09-22 PROCEDURE — 90670 PCV13 VACCINE IM: CPT | Mod: PBBFAC,PO

## 2017-09-22 PROCEDURE — G0009 ADMIN PNEUMOCOCCAL VACCINE: HCPCS | Mod: PBBFAC,PO

## 2017-09-22 PROCEDURE — 3074F SYST BP LT 130 MM HG: CPT | Mod: ,,, | Performed by: INTERNAL MEDICINE

## 2017-09-22 PROCEDURE — 99213 OFFICE O/P EST LOW 20 MIN: CPT | Mod: PBBFAC,PO | Performed by: INTERNAL MEDICINE

## 2017-09-22 PROCEDURE — 1159F MED LIST DOCD IN RCRD: CPT | Mod: ,,, | Performed by: INTERNAL MEDICINE

## 2017-09-22 RX ORDER — TRAZODONE HYDROCHLORIDE 50 MG/1
TABLET ORAL
Qty: 30 TABLET | Refills: 11 | Status: SHIPPED | OUTPATIENT
Start: 2017-09-22 | End: 2017-09-22 | Stop reason: SDUPTHER

## 2017-09-22 RX ORDER — ATORVASTATIN CALCIUM 40 MG/1
40 TABLET, FILM COATED ORAL DAILY
Qty: 90 TABLET | Refills: 3 | Status: SHIPPED | OUTPATIENT
Start: 2017-09-22 | End: 2018-07-03 | Stop reason: SDUPTHER

## 2017-09-22 RX ORDER — TRAZODONE HYDROCHLORIDE 50 MG/1
TABLET ORAL
Qty: 90 TABLET | Refills: 3 | Status: SHIPPED | OUTPATIENT
Start: 2017-09-22 | End: 2017-10-23 | Stop reason: SINTOL

## 2017-09-22 RX ORDER — LISINOPRIL 10 MG/1
10 TABLET ORAL DAILY
Qty: 90 TABLET | Refills: 3 | Status: SHIPPED | OUTPATIENT
Start: 2017-09-22 | End: 2018-07-03 | Stop reason: SDUPTHER

## 2017-09-22 RX ORDER — METOPROLOL SUCCINATE 100 MG/1
100 TABLET, EXTENDED RELEASE ORAL DAILY
Qty: 90 TABLET | Refills: 3 | Status: SHIPPED | OUTPATIENT
Start: 2017-09-22 | End: 2018-10-01 | Stop reason: SDUPTHER

## 2017-09-22 NOTE — PROGRESS NOTES
Prevnar-13 vaccine administered, hao well. Instructed to wait 15mins for observation, no reaction noted @ time of discharge.

## 2017-09-23 ENCOUNTER — LAB VISIT (OUTPATIENT)
Dept: LAB | Facility: HOSPITAL | Age: 82
End: 2017-09-23
Attending: INTERNAL MEDICINE
Payer: MEDICARE

## 2017-09-23 DIAGNOSIS — E78.2 MIXED HYPERLIPIDEMIA: ICD-10-CM

## 2017-09-23 DIAGNOSIS — I10 ESSENTIAL HYPERTENSION: ICD-10-CM

## 2017-09-23 LAB
ALBUMIN SERPL BCP-MCNC: 3.3 G/DL
ALP SERPL-CCNC: 96 U/L
ALT SERPL W/O P-5'-P-CCNC: 20 U/L
ANION GAP SERPL CALC-SCNC: 9 MMOL/L
AST SERPL-CCNC: 25 U/L
BILIRUB SERPL-MCNC: 1 MG/DL
BUN SERPL-MCNC: 17 MG/DL
CALCIUM SERPL-MCNC: 9.5 MG/DL
CHLORIDE SERPL-SCNC: 104 MMOL/L
CHOLEST SERPL-MCNC: 104 MG/DL
CHOLEST/HDLC SERPL: 2.7 {RATIO}
CO2 SERPL-SCNC: 28 MMOL/L
CREAT SERPL-MCNC: 0.9 MG/DL
EST. GFR  (AFRICAN AMERICAN): >60 ML/MIN/1.73 M^2
EST. GFR  (NON AFRICAN AMERICAN): >60 ML/MIN/1.73 M^2
GLUCOSE SERPL-MCNC: 108 MG/DL
HDLC SERPL-MCNC: 38 MG/DL
HDLC SERPL: 36.5 %
LDLC SERPL CALC-MCNC: 39.4 MG/DL
NONHDLC SERPL-MCNC: 66 MG/DL
POTASSIUM SERPL-SCNC: 4.4 MMOL/L
PROT SERPL-MCNC: 6.7 G/DL
SODIUM SERPL-SCNC: 141 MMOL/L
TRIGL SERPL-MCNC: 133 MG/DL

## 2017-09-23 PROCEDURE — 80053 COMPREHEN METABOLIC PANEL: CPT

## 2017-09-23 PROCEDURE — 80061 LIPID PANEL: CPT

## 2017-09-23 PROCEDURE — 36415 COLL VENOUS BLD VENIPUNCTURE: CPT | Mod: PO

## 2017-10-16 ENCOUNTER — TELEPHONE (OUTPATIENT)
Dept: FAMILY MEDICINE | Facility: CLINIC | Age: 82
End: 2017-10-16

## 2017-10-16 NOTE — TELEPHONE ENCOUNTER
----- Message from Marlene Kellogg sent at 10/16/2017  1:47 PM CDT -----  Contact: Self   Patient need lab results . Please call at [phone]

## 2017-10-16 NOTE — LETTER
2017    Aleks Brown  [address]  [address]  [place] [id number]             [facility]  Family Medicine  [address]  [place] [address]  Phone: [phone]  Fax: [phone] Dear Aleks:     It was nice to see you at your recent office visit.     To summarize your health and your results:  1.             Your labs were normal.     Recommendations and medication additions/changes:  1.             No new medication changes.     Follow up: Please call our office to schedule the followin.             Physical examination in 1 year.  I recommend you schedule a lab visit 1 week prior to your office visit, so we may discuss your lab results during your visit. Please fast for your labwork.  If you cannot schedule a lab visit ahead of your appointment, then please come fasting the day of your visit.     Your lab values were as follows:  * Blood chemistry panel, checking for diabetes (glucose), diseases of the kidneys (creatinine) and and minerals (sodium, potassium, chloride, CO2, calcium) - normal.  *           Liver tests (AST, ALT, alkaline phosphatase, bilirubin), major body proteins (total protein and albumin) - normal.  * Lipid profile- testing for cholesterol and triglycerides.  Your numbers were good - stay on your current dose of medication. In general, the goals for cholesterol are the following:  *       Cholesterol: <200 is normal  *       Triglycerides: <160 is normal  *       HDL Cholesterol (good cholesterol):>40 is normal. This is the good form of cholesterol, so the higher the better!  *       LDL Cholesterol (bad cholesterol): <130 is normal, but  the ideal is <100.  With heart disease and diabetes, < 70 is better.    Component      Latest Ref Rng & Units 2017   Sodium      136 - 145 mmol/L 141   Potassium      3.5 - 5.1 mmol/L 4.4   Chloride      95 - 110 mmol/L 104   CO2      23 - 29 mmol/L 28   Glucose      70 - 110 mg/dL 108   BUN, Bld       8 - 23 mg/dL 17   Creatinine      0.5 - 1.4 mg/dL 0.9   Calcium      8.7 - 10.5 mg/dL 9.5   Total Protein      6.0 - 8.4 g/dL 6.7   Albumin      3.5 - 5.2 g/dL 3.3 (L)   Total Bilirubin      0.1 - 1.0 mg/dL 1.0   Alkaline Phosphatase      55 - 135 U/L 96   AST      10 - 40 U/L 25   ALT      10 - 44 U/L 20   Anion Gap      8 - 16 mmol/L 9   eGFR if African American      >60 mL/min/1.73 m:2 >60.0   eGFR if non African American      >60 mL/min/1.73 m:2 >60.0   Cholesterol      120 - 199 mg/dL 104 (L)   Triglycerides      30 - 150 mg/dL 133   HDL      40 - 75 mg/dL 38 (L)   LDL Cholesterol      63.0 - 159.0 mg/dL 39.4 (L)   HDL/Chol Ratio      20.0 - 50.0 % 36.5   Total Cholesterol/HDL Ratio      2.0 - 5.0 2.7   Non-HDL Cholesterol      mg/dL 66     No results found from the In Basket message.    Wishing you good health,    Deshaun Goel MD

## 2017-10-17 NOTE — TELEPHONE ENCOUNTER
Spoke with pt wife and informed her that her husbands labs were normal and that he does not have to change meds

## 2017-10-23 ENCOUNTER — OFFICE VISIT (OUTPATIENT)
Dept: FAMILY MEDICINE | Facility: CLINIC | Age: 82
End: 2017-10-23
Payer: MEDICARE

## 2017-10-23 DIAGNOSIS — G47.00 INSOMNIA, UNSPECIFIED TYPE: Primary | ICD-10-CM

## 2017-10-23 DIAGNOSIS — F43.10 PTSD (POST-TRAUMATIC STRESS DISORDER): ICD-10-CM

## 2017-10-23 PROCEDURE — 99213 OFFICE O/P EST LOW 20 MIN: CPT | Mod: S$PBB,,, | Performed by: INTERNAL MEDICINE

## 2017-10-23 RX ORDER — LORAZEPAM 1 MG/1
1 TABLET ORAL EVERY 6 HOURS PRN
Qty: 30 TABLET | Refills: 2 | Status: SHIPPED | OUTPATIENT
Start: 2017-10-23 | End: 2018-01-27 | Stop reason: SDUPTHER

## 2017-10-23 NOTE — PROGRESS NOTES
This note was created by combination of typed  and Dragon dictation.  Transcription errors may be present.  If there are any questions, please contact me.    Assessment & Plan  Insomnia, unspecified type  PTSD (post-traumatic stress disorder)  -trazodone ineffective and with SE.  Go back to lorazepam 1 mg.  -     lorazepam (ATIVAN) 1 MG tablet; Take 1 tablet (1 mg total) by mouth every 6 (six) hours as needed for Anxiety.  Dispense: 30 tablet; Refill: 2            Medications Discontinued During This Encounter   Medication Reason    trazodone (DESYREL) 50 MG tablet Side effects       Follow-up: No Follow-up on file.      =================================================================      Chief Complaint   Patient presents with    Insomnia     trazodone ineffective, SE       HPI  Aleks is a 84 y.o. male, last appointment with this clinic was 9/22/2017.    Last visit I had tried him with trazodone instead of Ativan for insomnia.  He had not tried anything like that before.  He comes in today originally scheduled for the nurse practitioner but was unaware that he was going to see nurse practitioner not a physician.  This got him quite upset.  So was able to see him on an urgent basis.  He's tried trazodone for the last 2 weeks and found no efficacy whatsoever.  Side effects of making him feel dizzy.  That resolved after stopping the trazodone.  Still with insomnia which is quite upsetting to him.  Has taken Ativan long-standing.  Notes also a component of PTSD which he did not share with me previously.  Finds that the Ativan is effective and denies obvious side effects of the medication.    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                           There is 40 - 49% left Internal Carotid stenosis.   July 2011      Hyperlipidemia 11/06/2012    Hypertension 11/06/2012    Angina pectoris syndrome 11/06/2012       PAST MEDICAL HISTORY:  Past Medical History:   Diagnosis Date    Allergy     seasonal    Arthritis     Basal cell carcinoma 10 years    right ear     CAD (coronary artery disease) 11/29/2012    Hyperlipidemia     Hypertension     Joint pain     Squamous cell carcinoma 06/26/2017       PAST SURGICAL HISTORY:  Past Surgical History:   Procedure Laterality Date    CATARACT EXTRACTION      CORONARY ARTERY BYPASS GRAFT         SOCIAL HISTORY:  Social History     Social History    Marital status:      Spouse name: N/A    Number of children: N/A    Years of education: N/A     Occupational History    retired from: [employer].  .  Six kids. [place] War vet.  [employer] [facility] [place].         Social History Main Topics    Smoking status: Former Smoker    Smokeless tobacco: Never Used    Alcohol use Yes      Comment: Red wine daily    Drug use: No    Sexual activity: Not on file     Other Topics Concern    Not on file     Social History Narrative     x 60 yr.  [place] [facility] [place].         ALLERGIES AND MEDICATIONS: updated and reviewed.  Review of patient's allergies indicates:   Allergen Reactions    Iodine and iodide containing products Anaphylaxis    Trazodone      dizziness      Current Outpatient Prescriptions   Medication Sig Dispense Refill    aspirin (ASPIRIN LOW DOSE) 81 MG EC tablet Take 81 mg by mouth once daily.       atorvastatin (LIPITOR) 40 MG tablet Take 1 tablet (40 mg total) by mouth once daily. 90 tablet 3    clotrimazole-betamethasone 1-0.05% (LOTRISONE) cream Apply topically 2 (two) times daily as needed. Apply to affected area 45 g 11    desonide (DESOWEN) 0.05 % cream APPLY TO SCALY AREA(S) ON FACE TWICE A DAY 60 g 11    guaifenesin (MUCINEX) 1,200 mg Ta12 Take 1 tablet by mouth 2 (two) times daily. 14 tablet 0    lisinopril 10 MG tablet Take 1 tablet (10 mg total) by mouth once daily. 90 tablet 3    lorazepam (ATIVAN) 1 MG tablet Take 1 tablet (1 mg total) by mouth every 6 (six) hours as needed for Anxiety. 30 tablet 2    metoprolol succinate (TOPROL-XL) 100 MG 24 hr tablet Take 1 tablet (100 mg total) by mouth once daily. 90 tablet 3    MULTIVITAMIN ORAL Take 1 tablet by mouth once daily.       mupirocin (BACTROBAN) 2 % ointment once daily. Apply to affected area  1    nitroGLYCERIN (NITROSTAT) 0.4 MG SL tablet Place 1 tablet under the tongue as needed.       sildenafil (VIAGRA) 100 MG tablet Take 1 tablet by mouth.      vitamin E 400 UNIT capsule Take 400 Units by mouth once daily.       No current facility-administered medications for this visit.        Review of Systems   Constitutional: Negative for chills and fever.   Psychiatric/Behavioral: The patient has insomnia.        Physical Exam   There were no vitals filed for this visit. There is no height or weight on file to calculate BMI.            Physical Exam   Constitutional: He is oriented to person, place, and time. He appears well-developed and well-nourished. No distress.   Eyes: No scleral icterus.   Neurological: He is alert and oriented to person, place, and time.   Psychiatric: He has a normal mood and affect. His behavior is normal. Thought content normal.

## 2017-12-12 ENCOUNTER — CLINICAL SUPPORT (OUTPATIENT)
Dept: FAMILY MEDICINE | Facility: CLINIC | Age: 82
End: 2017-12-12
Payer: MEDICARE

## 2017-12-12 DIAGNOSIS — Z23 NEED FOR INFLUENZA VACCINATION: Primary | ICD-10-CM

## 2017-12-12 PROCEDURE — G0008 ADMIN INFLUENZA VIRUS VAC: HCPCS | Mod: PBBFAC,PO

## 2018-01-03 ENCOUNTER — OFFICE VISIT (OUTPATIENT)
Dept: URGENT CARE | Facility: CLINIC | Age: 83
End: 2018-01-03
Payer: MEDICARE

## 2018-01-03 VITALS
HEIGHT: 72 IN | OXYGEN SATURATION: 97 % | BODY MASS INDEX: 31.02 KG/M2 | DIASTOLIC BLOOD PRESSURE: 80 MMHG | SYSTOLIC BLOOD PRESSURE: 187 MMHG | TEMPERATURE: 97 F | WEIGHT: 229 LBS | RESPIRATION RATE: 18 BRPM | HEART RATE: 78 BPM

## 2018-01-03 DIAGNOSIS — J39.9 UPPER RESPIRATORY DISEASE: Primary | ICD-10-CM

## 2018-01-03 LAB
CTP QC/QA: YES
FLUAV AG NPH QL: NEGATIVE
FLUBV AG NPH QL: NEGATIVE

## 2018-01-03 PROCEDURE — 87804 INFLUENZA ASSAY W/OPTIC: CPT | Mod: 59,QW,S$GLB, | Performed by: PHYSICIAN ASSISTANT

## 2018-01-03 PROCEDURE — 99214 OFFICE O/P EST MOD 30 MIN: CPT | Mod: S$GLB,,, | Performed by: PHYSICIAN ASSISTANT

## 2018-01-03 NOTE — PROGRESS NOTES
Subjective:       Patient ID: Aleks Brown is a 85 y.o. male.    Vitals:  height is 6' (1.829 m) and weight is 103.9 kg (229 lb). His oral temperature is 97.2 °F (36.2 °C). His blood pressure is 187/80 (abnormal) and his pulse is 78. His respiration is 18 and oxygen saturation is 97%.     Chief Complaint: URI    URI    This is a new problem. The current episode started yesterday. The problem has been gradually worsening. There has been no fever. Associated symptoms include congestion, coughing and sneezing. Pertinent negatives include no abdominal pain, chest pain, ear pain, headaches, nausea, sore throat or wheezing.     Review of Systems   Constitution: Positive for malaise/fatigue. Negative for chills and fever.   HENT: Positive for congestion and sneezing. Negative for ear pain, hoarse voice and sore throat.    Eyes: Negative for discharge and redness.   Cardiovascular: Negative for chest pain, dyspnea on exertion and leg swelling.   Respiratory: Positive for cough and sputum production. Negative for shortness of breath and wheezing.    Musculoskeletal: Negative for myalgias.   Gastrointestinal: Negative for abdominal pain and nausea.   Neurological: Negative for headaches.   All other systems reviewed and are negative.      Objective:      Physical Exam   Constitutional: He is oriented to person, place, and time. He appears well-developed and well-nourished. He does not appear ill. No distress.   HENT:   Head: Normocephalic and atraumatic.   Right Ear: Hearing, tympanic membrane, external ear and ear canal normal.   Left Ear: Hearing, tympanic membrane, external ear and ear canal normal.   Nose: Nose normal. No mucosal edema. Right sinus exhibits no maxillary sinus tenderness and no frontal sinus tenderness. Left sinus exhibits no maxillary sinus tenderness and no frontal sinus tenderness.   Mouth/Throat: Uvula is midline and oropharynx is clear and moist. No oropharyngeal exudate, posterior  oropharyngeal edema or posterior oropharyngeal erythema.   Eyes: Conjunctivae, EOM and lids are normal. Right eye exhibits no discharge. Left eye exhibits no discharge.   Neck: Normal range of motion. Neck supple.   Cardiovascular: Normal rate, regular rhythm and normal heart sounds.  Exam reveals no gallop and no friction rub.    No murmur heard.  Pulmonary/Chest: Effort normal and breath sounds normal. No respiratory distress. He has no decreased breath sounds. He has no wheezes. He has no rhonchi. He has no rales.   Musculoskeletal: Normal range of motion.   Lymphadenopathy:        Head (right side): No submandibular and no tonsillar adenopathy present.        Head (left side): No submandibular and no tonsillar adenopathy present.   Neurological: He is alert and oriented to person, place, and time.   Skin: Skin is warm and dry. No rash noted. No erythema.   Psychiatric: He has a normal mood and affect. His behavior is normal.   Nursing note and vitals reviewed.      Assessment:       1. Upper respiratory disease        Office Visit on 01/03/2018   Component Date Value Ref Range Status    Rapid Influenza A Ag 01/03/2018 Negative  Negative Final    Rapid Influenza B Ag 01/03/2018 Negative  Negative Final     Acceptable 01/03/2018 Yes   Final     Plan:       Patient presented to make sure he did not have flu. Offered medicine for cough but pt states his cough is better with robitussin.     Upper respiratory disease  -     POCT Influenza A/B

## 2018-01-27 DIAGNOSIS — G47.00 INSOMNIA, UNSPECIFIED TYPE: ICD-10-CM

## 2018-01-28 RX ORDER — LORAZEPAM 1 MG/1
TABLET ORAL
Qty: 30 TABLET | Refills: 2 | Status: SHIPPED | OUTPATIENT
Start: 2018-01-28 | End: 2018-04-25 | Stop reason: SDUPTHER

## 2018-03-16 ENCOUNTER — OFFICE VISIT (OUTPATIENT)
Dept: CARDIOLOGY | Facility: CLINIC | Age: 83
End: 2018-03-16
Payer: MEDICARE

## 2018-03-16 VITALS
HEART RATE: 68 BPM | SYSTOLIC BLOOD PRESSURE: 129 MMHG | DIASTOLIC BLOOD PRESSURE: 81 MMHG | WEIGHT: 230.19 LBS | OXYGEN SATURATION: 98 % | BODY MASS INDEX: 31.18 KG/M2 | HEIGHT: 72 IN

## 2018-03-16 DIAGNOSIS — I25.10 CORONARY ARTERY DISEASE INVOLVING NATIVE CORONARY ARTERY WITHOUT ANGINA PECTORIS, UNSPECIFIED WHETHER NATIVE OR TRANSPLANTED HEART: ICD-10-CM

## 2018-03-16 DIAGNOSIS — E78.2 MIXED HYPERLIPIDEMIA: ICD-10-CM

## 2018-03-16 DIAGNOSIS — I77.9 RIGHT-SIDED CAROTID ARTERY DISEASE: Primary | ICD-10-CM

## 2018-03-16 DIAGNOSIS — I10 HTN (HYPERTENSION): ICD-10-CM

## 2018-03-16 DIAGNOSIS — Z95.1 POSTSURGICAL AORTOCORONARY BYPASS STATUS: ICD-10-CM

## 2018-03-16 DIAGNOSIS — I10 ESSENTIAL HYPERTENSION: ICD-10-CM

## 2018-03-16 DIAGNOSIS — F43.10 PTSD (POST-TRAUMATIC STRESS DISORDER): ICD-10-CM

## 2018-03-16 PROCEDURE — 99999 PR PBB SHADOW E&M-EST. PATIENT-LVL III: CPT | Mod: PBBFAC,,, | Performed by: INTERNAL MEDICINE

## 2018-03-16 PROCEDURE — 99214 OFFICE O/P EST MOD 30 MIN: CPT | Mod: S$PBB,,, | Performed by: INTERNAL MEDICINE

## 2018-03-16 PROCEDURE — 99213 OFFICE O/P EST LOW 20 MIN: CPT | Mod: PBBFAC,PO | Performed by: INTERNAL MEDICINE

## 2018-03-16 PROCEDURE — 93010 ELECTROCARDIOGRAM REPORT: CPT | Mod: ,,, | Performed by: INTERNAL MEDICINE

## 2018-03-16 PROCEDURE — 93005 ELECTROCARDIOGRAM TRACING: CPT | Mod: PBBFAC,PO | Performed by: INTERNAL MEDICINE

## 2018-03-16 NOTE — PROGRESS NOTES
Subjective:    Patient ID:  Aleks Brown is a 85 y.o. male who presents for evaluation of Coronary Artery Disease      HPI   CAD/CABG x 5 1988([facility] 2011 with 4/5 grafts patent), HTN, HLD, COPD, carotid disease    Previously saw Dr Velarde - last 10/2016    Mr. Brown presents for follow-up without any complaints and is feeling    well. He has no interval medical problems since last seen by me.      He has shoulder discomfort and sob if he walks too fast - same as 5 yrs ago when had cath - see below.  When he paces himself, he can walk 10 miles without discomfort.  These are described as bilateral shoulder pain and some dyspnea.    Both short-lived.      Can do vigorous activities without provoking sx.    The pattern is totally unchanged.      Mr. Brown has no symptoms of chf. He has no symptoms of dysrhythmia.    He has never had syncope. He has no claudication.    I did review the findings of carotid u/s    Labs now look great      CHRONIC CONDITIONS:    - ANGINA PECTORIS NEC&NOS (stable) - sporadic    - COR AS NATIVE VESSEL    - HYPERLIPIDEMIA NEC & NOS - ldl not at goal 1-12    - HYPERTENSION NOS - currently controlled    - AORTOCORONARY BYPASS    - Cereb Art Occl S Infarct      ACTIVE PROBLEM LIST:    - S/P unstable angina 02/12/2011    - S/P CABG x 5 1988;    - hypertension    - dyslipidemia    - Carotid stenosis    - COPD    - obese      Cath 2-11 showed 4/5 grafts patent - this was for the sx of shoulder discomfort and sob.     CONCLUSIONS 11-12 - this was basically unchanged in 2015  There is 60 - 69% right Internal Carotid stenosis.  There is 40 - 49% left Internal Carotid stenosis.  Bilateral ECA stenosis.     Very  and active for his age  Denies CP or SOB  EKG NSR - ok    Review of Systems   Constitution: Negative for decreased appetite.   HENT: Negative for ear discharge.    Eyes: Negative for blurred vision.   Endocrine: Negative for polyphagia.   Skin: Negative for nail changes.    Neurological: Negative for aphonia.   Psychiatric/Behavioral: Negative for hallucinations.        Objective:    Physical Exam   Constitutional: He is oriented to person, place, and time. He appears well-developed and well-nourished.   HENT:   Head: Normocephalic and atraumatic.   Eyes: Conjunctivae are normal. Pupils are equal, round, and reactive to light.   Neck: Normal range of motion. Neck supple.   Cardiovascular: Normal rate and intact distal pulses.    Murmur heard.   Systolic murmur is present with a grade of 2/6   Pulmonary/Chest: Effort normal and breath sounds normal.   Abdominal: Soft. Bowel sounds are normal.   Musculoskeletal: Normal range of motion.   Neurological: He is alert and oriented to person, place, and time.   Skin: Skin is warm and dry.         Assessment:       1. Right-sided carotid artery disease    2. Mixed hyperlipidemia    3. Postsurgical aortocoronary bypass status    4. Essential hypertension    5. Coronary artery disease involving native coronary artery without angina pectoris, unspecified whether native or transplanted heart    6. PTSD (post-traumatic stress disorder)         Plan:       Cardiac stable  Currently not interested in further cardiac testing unless absolutely necessary  OV 6 months

## 2018-04-25 DIAGNOSIS — G47.00 INSOMNIA, UNSPECIFIED TYPE: ICD-10-CM

## 2018-04-25 RX ORDER — LORAZEPAM 1 MG/1
TABLET ORAL
Qty: 30 TABLET | Refills: 2 | Status: SHIPPED | OUTPATIENT
Start: 2018-04-25 | End: 2018-08-04 | Stop reason: SDUPTHER

## 2018-05-08 ENCOUNTER — OFFICE VISIT (OUTPATIENT)
Dept: DERMATOLOGY | Facility: CLINIC | Age: 83
End: 2018-05-08
Payer: MEDICARE

## 2018-05-08 DIAGNOSIS — L82.1 SK (SEBORRHEIC KERATOSIS): ICD-10-CM

## 2018-05-08 DIAGNOSIS — Z85.828 HISTORY OF NONMELANOMA SKIN CANCER: ICD-10-CM

## 2018-05-08 DIAGNOSIS — L57.0 AK (ACTINIC KERATOSIS): Primary | ICD-10-CM

## 2018-05-08 DIAGNOSIS — R23.8 VENOUS LAKE: ICD-10-CM

## 2018-05-08 PROCEDURE — 17003 DESTRUCT PREMALG LES 2-14: CPT | Mod: PBBFAC | Performed by: DERMATOLOGY

## 2018-05-08 PROCEDURE — 99999 PR PBB SHADOW E&M-EST. PATIENT-LVL II: CPT | Mod: PBBFAC,,, | Performed by: DERMATOLOGY

## 2018-05-08 PROCEDURE — 99212 OFFICE O/P EST SF 10 MIN: CPT | Mod: PBBFAC,25 | Performed by: DERMATOLOGY

## 2018-05-08 PROCEDURE — 17000 DESTRUCT PREMALG LESION: CPT | Mod: PBBFAC | Performed by: DERMATOLOGY

## 2018-05-08 PROCEDURE — 17000 DESTRUCT PREMALG LESION: CPT | Mod: S$PBB,,, | Performed by: DERMATOLOGY

## 2018-05-08 PROCEDURE — 17003 DESTRUCT PREMALG LES 2-14: CPT | Mod: S$PBB,,, | Performed by: DERMATOLOGY

## 2018-05-08 PROCEDURE — 99213 OFFICE O/P EST LOW 20 MIN: CPT | Mod: 25,S$PBB,, | Performed by: DERMATOLOGY

## 2018-05-08 RX ORDER — TRAZODONE HYDROCHLORIDE 50 MG/1
TABLET ORAL
Refills: 3 | COMMUNITY
Start: 2018-03-27 | End: 2018-10-02

## 2018-05-08 NOTE — PATIENT INSTRUCTIONS

## 2018-05-08 NOTE — PROGRESS NOTES
Subjective:       Patient ID:  Aleks Brown is a 85 y.o. male who presents for   Chief Complaint   Patient presents with    Skin Check     UBSE     History of Present Illness: The patient presents for follow up of skin check.    The patient was last seen on: 8/2017 for shave bx of bcc left earlobe - excised by SSW  This is a high risk patient here to check for the development of new lesions.  Other skin complaints: none          Review of Systems   Skin: Positive for wears hat. Negative for daily sunscreen use, activity-related sunscreen use and recent sunburn.   Hematologic/Lymphatic: Bruises/bleeds easily (on asa).        Objective:    Physical Exam   Constitutional: He appears well-developed and well-nourished. No distress.   Neurological: He is alert and oriented to person, place, and time. He is not disoriented.   Psychiatric: He has a normal mood and affect.   Skin:   Areas Examined (abnormalities noted in diagram):   Scalp / Hair Palpated and Inspected  Head / Face Inspection Performed  Neck Inspection Performed  Chest / Axilla Inspection Performed  Back Inspection Performed  RUE Inspected  LUE Inspection Performed                   Diagram Legend     Erythematous scaling macule/papule c/w actinic keratosis       Vascular papule c/w angioma      Pigmented verrucoid papule/plaque c/w seborrheic keratosis      Yellow umbilicated papule c/w sebaceous hyperplasia      Irregularly shaped tan macule c/w lentigo     1-2 mm smooth white papules consistent with Milia      Movable subcutaneous cyst with punctum c/w epidermal inclusion cyst      Subcutaneous movable cyst c/w pilar cyst      Firm pink to brown papule c/w dermatofibroma      Pedunculated fleshy papule(s) c/w skin tag(s)      Evenly pigmented macule c/w junctional nevus     Mildly variegated pigmented, slightly irregular-bordered macule c/w mildly atypical nevus      Flesh colored to evenly pigmented papule c/w intradermal nevus       Pink pearly  papule/plaque c/w basal cell carcinoma      Erythematous hyperkeratotic cursted plaque c/w SCC      Surgical scar with no sign of skin cancer recurrence      Open and closed comedones      Inflammatory papules and pustules      Verrucoid papule consistent consistent with wart     Erythematous eczematous patches and plaques     Dystrophic onycholytic nail with subungual debris c/w onychomycosis     Umbilicated papule    Erythematous-base heme-crusted tan verrucoid plaque consistent with inflamed seborrheic keratosis     Erythematous Silvery Scaling Plaque c/w Psoriasis     See annotation      Assessment / Plan:        AK (actinic keratosis)  Cryosurgery Procedure Note    Verbal consent from the patient is obtained including, but not limited to, risk of hypopigmentation/hyperpigmentation, scar, recurrence of lesion. The patient is aware of the precancerous quality and need for treatment of these lesions. Liquid nitrogen cryosurgery is applied to the 3 actinic keratoses, as detailed in the physical exam, to produce a freeze injury. The patient is aware that blisters may form and is instructed on wound care with gentle cleansing and use of vaseline ointment to keep moist until healed. The patient is supplied a handout on cryosurgery and is instructed to call if lesions do not completely resolve.      SK (seborrheic keratosis)   - stable and chronic    Venous lake   - stable and chronic      History of nonmelanoma skin cancer  Area(s) of previous NMSC evaluated with no signs of recurrence.    Upper body skin examination performed today including at least 6 points as noted in physical examination. No lesions suspicious for malignancy noted.               Follow-up in about 6 months (around 11/8/2018).

## 2018-07-03 DIAGNOSIS — I77.9 RIGHT-SIDED CAROTID ARTERY DISEASE: ICD-10-CM

## 2018-07-03 DIAGNOSIS — I10 ESSENTIAL HYPERTENSION: ICD-10-CM

## 2018-07-03 DIAGNOSIS — E78.2 MIXED HYPERLIPIDEMIA: ICD-10-CM

## 2018-07-03 RX ORDER — ATORVASTATIN CALCIUM 40 MG/1
TABLET, FILM COATED ORAL
Qty: 90 TABLET | Refills: 1 | Status: SHIPPED | OUTPATIENT
Start: 2018-07-03 | End: 2018-10-02 | Stop reason: SDUPTHER

## 2018-07-03 RX ORDER — LISINOPRIL 10 MG/1
TABLET ORAL
Qty: 90 TABLET | Refills: 1 | Status: SHIPPED | OUTPATIENT
Start: 2018-07-03 | End: 2018-10-02 | Stop reason: SDUPTHER

## 2018-08-04 DIAGNOSIS — G47.00 INSOMNIA, UNSPECIFIED TYPE: ICD-10-CM

## 2018-08-05 RX ORDER — LORAZEPAM 1 MG/1
TABLET ORAL
Qty: 30 TABLET | Refills: 1 | Status: SHIPPED | OUTPATIENT
Start: 2018-08-05 | End: 2018-10-01 | Stop reason: SDUPTHER

## 2018-10-01 DIAGNOSIS — G47.00 INSOMNIA, UNSPECIFIED TYPE: ICD-10-CM

## 2018-10-01 DIAGNOSIS — I10 ESSENTIAL HYPERTENSION: ICD-10-CM

## 2018-10-01 DIAGNOSIS — E78.2 MIXED HYPERLIPIDEMIA: Primary | ICD-10-CM

## 2018-10-01 DIAGNOSIS — I25.10 CORONARY ARTERY DISEASE INVOLVING NATIVE CORONARY ARTERY WITHOUT ANGINA PECTORIS, UNSPECIFIED WHETHER NATIVE OR TRANSPLANTED HEART: ICD-10-CM

## 2018-10-01 RX ORDER — METOPROLOL SUCCINATE 100 MG/1
TABLET, EXTENDED RELEASE ORAL
Qty: 90 TABLET | Refills: 0 | Status: SHIPPED | OUTPATIENT
Start: 2018-10-01 | End: 2018-10-02 | Stop reason: SDUPTHER

## 2018-10-01 RX ORDER — TRAZODONE HYDROCHLORIDE 50 MG/1
TABLET ORAL
Qty: 90 TABLET | Refills: 0 | Status: SHIPPED | OUTPATIENT
Start: 2018-10-01 | End: 2018-10-02

## 2018-10-01 RX ORDER — LORAZEPAM 1 MG/1
1 TABLET ORAL DAILY PRN
Qty: 30 TABLET | Refills: 0 | Status: SHIPPED | OUTPATIENT
Start: 2018-10-01 | End: 2018-10-02 | Stop reason: SDUPTHER

## 2018-10-01 NOTE — PROGRESS NOTES
This note was created by combination of typed  and Dragon dictation.  Transcription errors may be present.  If there are any questions, please contact me.    Assessment & Plan:   Essential hypertension  Coronary artery disease involving native coronary artery without angina pectoris, unspecified whether native or transplanted heart.  Mixed hyperlipidemia  Angina pectoris syndrome  Right-sided carotid artery disease  -stable refill to pharmacy metoprolol and lisinopril.  On statin, on aspirin, beta-blocker, ACE-inhibitor.  Check labs.  Has not required nitroglycerin.  Recommended to stop the OTC Vit E  -     metoprolol succinate (TOPROL-XL) 100 MG 24 hr tablet; Take 1 tablet (100 mg total) by mouth once daily.  Dispense: 90 tablet; Refill: 1  -     lisinopril 10 MG tablet; Take 1 tablet (10 mg total) by mouth once daily.  Dispense: 90 tablet; Refill: 1  -     atorvastatin (LIPITOR) 40 MG tablet; Take 1 tablet (40 mg total) by mouth once daily.  Dispense: 90 tablet; Refill: 1  -     Comprehensive metabolic panel; Future; Expected date: 10/02/2018  -     Lipid panel; Future; Expected date: 10/02/2018    -     metoprolol succinate (TOPROL-XL) 100 MG 24 hr tablet; Take 1 tablet (100 mg total) by mouth once daily.  Dispense: 90 tablet; Refill: 1    Insomnia, unspecified type  -trazodone with side effects.  No change in lorazepam refill to pharmacy  -     LORazepam (ATIVAN) 1 MG tablet; Take 1 tablet (1 mg total) by mouth daily as needed.  Dispense: 30 tablet; Refill: 2    Needs flu shot  -     Influenza - High Dose (65+) (PF) (IM)    Need for vaccination for Strep pneumoniae  -     (In Office Administered) Pneumococcal Polysaccharide Vaccine (23 Valent) (SQ/IM)    Need for shingles vaccine  -printed prescription for him to take to the pharmacy  -     varicella-zoster gE-AS01B, PF, (SHINGRIX, PF,) 50 mcg/0.5 mL injection; Inject 0.5 mLs into the muscle once. Repeat in 2 months for 1 dose  Dispense: 0.5 mL;  Refill: 1    Counseling regarding end of life decision making  -handout regarding advanced directives given to the patient and discussed benefits of delineating his wishes as such.  Tentatively he states that in instance of terminal condition he would not want prolonged ventilation.    Medications Discontinued During This Encounter   Medication Reason    traZODone (DESYREL) 50 MG tablet Allergic response    guaifenesin (MUCINEX) 1,200 mg Ta12 Therapy completed         Current Outpatient Medications:     aspirin (ASPIRIN LOW DOSE) 81 MG EC tablet, Take 81 mg by mouth once daily. , Disp: , Rfl:     atorvastatin (LIPITOR) 40 MG tablet, Take 1 tablet (40 mg total) by mouth once daily., Disp: 90 tablet, Rfl: 1    clotrimazole-betamethasone 1-0.05% (LOTRISONE) cream, Apply topically 2 (two) times daily as needed. Apply to affected area, Disp: 45 g, Rfl: 11    desonide (DESOWEN) 0.05 % cream, APPLY TO SCALY AREA(S) ON FACE TWICE A DAY, Disp: 60 g, Rfl: 11    lisinopril 10 MG tablet, Take 1 tablet (10 mg total) by mouth once daily., Disp: 90 tablet, Rfl: 1    LORazepam (ATIVAN) 1 MG tablet, Take 1 tablet (1 mg total) by mouth daily as needed., Disp: 30 tablet, Rfl: 2    metoprolol succinate (TOPROL-XL) 100 MG 24 hr tablet, Take 1 tablet (100 mg total) by mouth once daily., Disp: 90 tablet, Rfl: 1    MULTIVITAMIN ORAL, Take 1 tablet by mouth once daily. , Disp: , Rfl:     nitroGLYCERIN (NITROSTAT) 0.4 MG SL tablet, Place 1 tablet under the tongue as needed. , Disp: , Rfl:     sildenafil (VIAGRA) 100 MG tablet, Take 1 tablet by mouth., Disp: , Rfl:     varicella-zoster gE-AS01B, PF, (SHINGRIX, PF,) 50 mcg/0.5 mL injection, Inject 0.5 mLs into the muscle once. Repeat in 2 months for 1 dose, Disp: 0.5 mL, Rfl: 1    Follow Up: No Follow-up on file. OV 6 months. Recall entered    Subjective:     Chief Complaint   Patient presents with    Medication Refill       HPI  Aleks is a 85 y.o. male, last appointment with  "this clinic was Visit date not found.    CAD s/p CABG x 5 1998.  Was seeing Syd but no longer with [facility] and he's considering transferring here to .  [facility] 2/2011 4/5 grafts patent  Carotid artery disease last US 2015 with nonocclusive stenosis  Hypertension  Hyperlipidemia  SCC cheek  Insomnia. Lorazepam in the evening. Notes insomnia all his life.  Lorazepam longstanding. Trial of trazodone    Notes balance issues.  Put up bars on the shower stall and removed throw rugs.  He's more conscious about walking and more cautious.  Not daily/not all day.  Can be walking and then get sensation of imbalance. Not a dizziness not a lightheadedness.  Nocturia - sits by the side of the bed for a while first. Walking his dog OK. Denies peripheral neuropathy/anesthesia.  He did trip and fall walking the dog one episode and has been much more conscious of it now. Notes chronic leg weakness he thinks.  But thinks overall has not affected his lifestyle.  Wife suggested a cane.     Takes an MVI; a vitamin E "Just because" and a vit C. Recommended to stop the vitamin E.    Patient Care Team:  Deshaun Goel MD as PCP - General (Internal Medicine)  Desha[facility] Goel MD as PCP - [facility]  Magnus Rashid MD as Consulting Physician (Dermatology)  Gillian Williamson MD as Consulting Physician (Dermatology)    Patient Active Problem List    Diagnosis Date Noted    PTSD (post-traumatic stress disorder) 10/23/2017    Essential hypertension 10/29/2015    Coronary artery disease involving native coronary artery without angina pectoris 10/29/2015     s/p CABG x 5 1998  [facility] 2/2011 4/5 grafts patent      Insomnia hx trazodone ineffective and with SE 08/01/2014     10/2017 trazodone ineffective and SE of dizziness      Postsurgical aortocoronary bypass status 11/29/2012     CABG x 5 (1988 SVG to OM1 (sequential graft), SVG to OM2 (sequential         graft), LIMA to LAD (single graft), SVG to PDA (single graft), HUY to       D1 " (single graft))       Carotid artery disease 11/06/2012     There is 60 - 69% right Internal Carotid stenosis.                           There is 40 - 49% left Internal Carotid stenosis.   July 2011      Pure hypercholesterolemia 11/06/2012    Angina pectoris syndrome 11/06/2012       PAST MEDICAL HISTORY:  Past Medical History:   Diagnosis Date    Allergy     seasonal    Arthritis     Basal cell carcinoma 10 years    right ear     Basal cell carcinoma 08/24/2017    Left earlobe     CAD (coronary artery disease) 11/29/2012    Hyperlipidemia     Hypertension     Joint pain     Squamous cell carcinoma 06/26/2017       PAST SURGICAL HISTORY:  Past Surgical History:   Procedure Laterality Date    CATARACT EXTRACTION      CORONARY ARTERY BYPASS GRAFT      INSERTION-INTRAOCULAR LENS (IOL) Left 2/5/2015    [facility]formed [facility] [place]    INSERTION-INTRAOCULAR LENS (IOL) Right 1/15/2015    [facility]    PHACOEMULSIFICATION-ASPIRATION-CATARACT Left 2/5/2015    [facility]    PHACOEMULSIFICATION-ASPIRATION-CATARACT Right 1/15/2015    Performed [facility] at [facility] OR [facility] [place]       SOCIAL HISTORY:  Social History     Socioeconomic History    Marital status:      Spouse name: Not on file    Number of children: Not on file    Years of education: Not on file    Highest education level: Not on file   Social Needs    Financial resource strain: Not on file    Food insecurity - worry: Not on file    Food insecurity - inability: Not on file    Transportation needs - medical: Not on file    Transportation needs - non-medical: Not on file   Occupational History    Occupation: 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  .  Six kids. [place]  [employer] [place]      Tobacco Use    Smoking status: Former Smoker    Smokeless tobacco: Never Used   Substance and Sexual Activity    Alcohol  use: Yes     Comment: Red wine daily    Drug use: No    Sexual activity: Not on file   Other Topics Concern    Not on file   Social History Narrative     x 60 yr.  [employer] [facility] [place].         ALLERGIES AND MEDICATIONS: updated and reviewed.  Review of patient's allergies indicates:   Allergen Reactions    Iodine and iodide containing products Anaphylaxis    Trazodone      dizziness     Current Outpatient Medications   Medication Sig Dispense Refill    aspirin (ASPIRIN LOW DOSE) 81 MG EC tablet Take 81 mg by mouth once daily.       atorvastatin (LIPITOR) 40 MG tablet Take 1 tablet by mouth once daily. 90 tablet 1    clotrimazole-betamethasone 1-0.05% (LOTRISONE) cream Apply topically 2 (two) times daily as needed. Apply to affected area 45 g 11    desonide (DESOWEN) 0.05 % cream APPLY TO SCALY AREA(S) ON FACE TWICE A DAY 60 g 11    lisinopril 10 MG tablet Take 1 tablet by mouth once daily. 90 tablet 1    LORazepam (ATIVAN) 1 MG tablet Take 1 tablet (1 mg total) by mouth daily as needed. 30 tablet 0    metoprolol succinate (TOPROL-XL) 100 MG 24 hr tablet Take 1 tablet by mouth once daily. 90 tablet 0    MULTIVITAMIN ORAL Take 1 tablet by mouth once daily.       nitroGLYCERIN (NITROSTAT) 0.4 MG SL tablet Place 1 tablet under the tongue as needed.       vitamin E 400 UNIT capsule Take 400 Units by mouth once daily.      mupirocin (BACTROBAN) 2 % ointment once daily. Apply to affected area  1    sildenafil (VIAGRA) 100 MG tablet Take 1 tablet by mouth.       No current facility-administered medications for this visit.        Review of Systems   Constitutional: Negative for chills and fever.   Respiratory: Negative for shortness of breath.    Cardiovascular: Negative for chest pain and palpitations.   Neurological: Negative for dizziness, tremors, sensory change, speech change, focal weakness and headaches.       Objective:   Physical Exam   Vitals:    10/02/18 1442   BP: 132/64   Pulse: 60   Temp:  98 °F (36.7 °C)   SpO2: 98%   Weight: 105.3 kg (232 lb 2.3 oz)   Height: 6' (1.829 m)    Body mass index is 31.48 kg/m².  Weight: 105.3 kg (232 lb 2.3 oz)   Height: 6' (182.9 cm)     Physical Exam   Constitutional: He is oriented to person, place, and time. He appears well-developed and well-nourished. No distress.   Eyes: EOM are normal.   Cardiovascular: Normal rate, regular rhythm and normal heart sounds.   No murmur heard.  Pulmonary/Chest: Effort normal and breath sounds normal.   Musculoskeletal: Normal range of motion. He exhibits no edema.   Neurological: He is alert and oriented to person, place, and time. Coordination normal.   Pronator drift negative.  Romberg negative.  Finger-nose intact. Heel-shin intact.   5/5.  Cranial nerves 2-12 grossly intact   Skin: Skin is warm and dry.   Psychiatric: He has a normal mood and affect. His behavior is normal. Thought content normal.

## 2018-10-02 ENCOUNTER — LAB VISIT (OUTPATIENT)
Dept: LAB | Facility: HOSPITAL | Age: 83
End: 2018-10-02
Attending: INTERNAL MEDICINE
Payer: MEDICARE

## 2018-10-02 ENCOUNTER — OFFICE VISIT (OUTPATIENT)
Dept: FAMILY MEDICINE | Facility: CLINIC | Age: 83
End: 2018-10-02
Payer: MEDICARE

## 2018-10-02 VITALS
WEIGHT: 232.13 LBS | HEART RATE: 60 BPM | OXYGEN SATURATION: 98 % | SYSTOLIC BLOOD PRESSURE: 132 MMHG | BODY MASS INDEX: 31.44 KG/M2 | DIASTOLIC BLOOD PRESSURE: 64 MMHG | TEMPERATURE: 98 F | HEIGHT: 72 IN

## 2018-10-02 DIAGNOSIS — I10 ESSENTIAL HYPERTENSION: Primary | ICD-10-CM

## 2018-10-02 DIAGNOSIS — Z23 NEEDS FLU SHOT: ICD-10-CM

## 2018-10-02 DIAGNOSIS — I25.10 CORONARY ARTERY DISEASE INVOLVING NATIVE CORONARY ARTERY WITHOUT ANGINA PECTORIS, UNSPECIFIED WHETHER NATIVE OR TRANSPLANTED HEART: ICD-10-CM

## 2018-10-02 DIAGNOSIS — E78.2 MIXED HYPERLIPIDEMIA: ICD-10-CM

## 2018-10-02 DIAGNOSIS — G47.00 INSOMNIA, UNSPECIFIED TYPE: ICD-10-CM

## 2018-10-02 DIAGNOSIS — Z23 NEED FOR VACCINATION FOR STREP PNEUMONIAE: ICD-10-CM

## 2018-10-02 DIAGNOSIS — I20.9 ANGINA PECTORIS SYNDROME: ICD-10-CM

## 2018-10-02 DIAGNOSIS — I77.9 RIGHT-SIDED CAROTID ARTERY DISEASE, UNSPECIFIED TYPE: ICD-10-CM

## 2018-10-02 DIAGNOSIS — Z71.89 COUNSELING REGARDING END OF LIFE DECISION MAKING: ICD-10-CM

## 2018-10-02 DIAGNOSIS — Z23 NEED FOR SHINGLES VACCINE: ICD-10-CM

## 2018-10-02 LAB
ALBUMIN SERPL BCP-MCNC: 3.9 G/DL
ALP SERPL-CCNC: 91 U/L
ALT SERPL W/O P-5'-P-CCNC: 22 U/L
ANION GAP SERPL CALC-SCNC: 8 MMOL/L
AST SERPL-CCNC: 27 U/L
BILIRUB SERPL-MCNC: 0.8 MG/DL
BUN SERPL-MCNC: 26 MG/DL
CALCIUM SERPL-MCNC: 10 MG/DL
CHLORIDE SERPL-SCNC: 104 MMOL/L
CHOLEST SERPL-MCNC: 123 MG/DL
CHOLEST/HDLC SERPL: 3 {RATIO}
CO2 SERPL-SCNC: 26 MMOL/L
CREAT SERPL-MCNC: 1 MG/DL
EST. GFR  (AFRICAN AMERICAN): >60 ML/MIN/1.73 M^2
EST. GFR  (NON AFRICAN AMERICAN): >60 ML/MIN/1.73 M^2
GLUCOSE SERPL-MCNC: 105 MG/DL
HDLC SERPL-MCNC: 41 MG/DL
HDLC SERPL: 33.3 %
LDLC SERPL CALC-MCNC: 61.6 MG/DL
NONHDLC SERPL-MCNC: 82 MG/DL
POTASSIUM SERPL-SCNC: 4.7 MMOL/L
PROT SERPL-MCNC: 7 G/DL
SODIUM SERPL-SCNC: 138 MMOL/L
TRIGL SERPL-MCNC: 102 MG/DL

## 2018-10-02 PROCEDURE — 99214 OFFICE O/P EST MOD 30 MIN: CPT | Mod: S$PBB,,, | Performed by: INTERNAL MEDICINE

## 2018-10-02 PROCEDURE — 99999 PR PBB SHADOW E&M-EST. PATIENT-LVL III: CPT | Mod: PBBFAC,,, | Performed by: INTERNAL MEDICINE

## 2018-10-02 PROCEDURE — 99213 OFFICE O/P EST LOW 20 MIN: CPT | Mod: PBBFAC,PO,25 | Performed by: INTERNAL MEDICINE

## 2018-10-02 PROCEDURE — 90662 IIV NO PRSV INCREASED AG IM: CPT | Mod: PBBFAC,PO

## 2018-10-02 PROCEDURE — 36415 COLL VENOUS BLD VENIPUNCTURE: CPT | Mod: PO

## 2018-10-02 PROCEDURE — 80061 LIPID PANEL: CPT

## 2018-10-02 PROCEDURE — 90732 PPSV23 VACC 2 YRS+ SUBQ/IM: CPT | Mod: PBBFAC,PO

## 2018-10-02 PROCEDURE — 80053 COMPREHEN METABOLIC PANEL: CPT

## 2018-10-02 RX ORDER — LISINOPRIL 10 MG/1
10 TABLET ORAL DAILY
Qty: 90 TABLET | Refills: 1 | Status: SHIPPED | OUTPATIENT
Start: 2018-10-02 | End: 2019-06-26 | Stop reason: SDUPTHER

## 2018-10-02 RX ORDER — ATORVASTATIN CALCIUM 40 MG/1
40 TABLET, FILM COATED ORAL DAILY
Qty: 90 TABLET | Refills: 1 | Status: SHIPPED | OUTPATIENT
Start: 2018-10-02 | End: 2019-06-26 | Stop reason: SDUPTHER

## 2018-10-02 RX ORDER — METOPROLOL SUCCINATE 100 MG/1
100 TABLET, EXTENDED RELEASE ORAL DAILY
Qty: 90 TABLET | Refills: 1 | Status: SHIPPED | OUTPATIENT
Start: 2018-10-02 | End: 2019-06-25 | Stop reason: SDUPTHER

## 2018-10-02 RX ORDER — LORAZEPAM 1 MG/1
1 TABLET ORAL DAILY PRN
Qty: 30 TABLET | Refills: 2 | Status: SHIPPED | OUTPATIENT
Start: 2018-10-02 | End: 2019-02-13 | Stop reason: SDUPTHER

## 2018-10-18 ENCOUNTER — OFFICE VISIT (OUTPATIENT)
Dept: FAMILY MEDICINE | Facility: CLINIC | Age: 83
End: 2018-10-18
Payer: MEDICARE

## 2018-10-18 VITALS
HEIGHT: 72 IN | SYSTOLIC BLOOD PRESSURE: 138 MMHG | TEMPERATURE: 98 F | OXYGEN SATURATION: 96 % | HEART RATE: 58 BPM | DIASTOLIC BLOOD PRESSURE: 80 MMHG | BODY MASS INDEX: 31.71 KG/M2 | WEIGHT: 234.13 LBS | RESPIRATION RATE: 20 BRPM

## 2018-10-18 DIAGNOSIS — M79.675 PAIN OF TOE OF LEFT FOOT: Primary | ICD-10-CM

## 2018-10-18 PROCEDURE — 99213 OFFICE O/P EST LOW 20 MIN: CPT | Mod: PBBFAC,PO | Performed by: FAMILY MEDICINE

## 2018-10-18 PROCEDURE — 99214 OFFICE O/P EST MOD 30 MIN: CPT | Mod: S$PBB,,, | Performed by: FAMILY MEDICINE

## 2018-10-18 PROCEDURE — 99999 PR PBB SHADOW E&M-EST. PATIENT-LVL III: CPT | Mod: PBBFAC,,, | Performed by: FAMILY MEDICINE

## 2018-10-18 NOTE — PROGRESS NOTES
Subjective:       Patient ID: Aleks Brown is a 85 y.o. male.    Chief Complaint: Toe Pain (left foot for past 4 days )    HPI    Onset of L 5th toe pain x 4 days that is constant, not secondary to trauma or other injury. Pain is better when he walks, as he feels that his small toe is digging into his 4th toe. Pain is worse at night.     No f/c/n/v      Current Outpatient Medications on File Prior to Visit   Medication Sig Dispense Refill    aspirin (ASPIRIN LOW DOSE) 81 MG EC tablet Take 81 mg by mouth once daily.       atorvastatin (LIPITOR) 40 MG tablet Take 1 tablet (40 mg total) by mouth once daily. 90 tablet 1    clotrimazole-betamethasone 1-0.05% (LOTRISONE) cream Apply topically 2 (two) times daily as needed. Apply to affected area 45 g 11    desonide (DESOWEN) 0.05 % cream APPLY TO SCALY AREA(S) ON FACE TWICE A DAY 60 g 11    lisinopril 10 MG tablet Take 1 tablet (10 mg total) by mouth once daily. 90 tablet 1    LORazepam (ATIVAN) 1 MG tablet Take 1 tablet (1 mg total) by mouth daily as needed. 30 tablet 2    metoprolol succinate (TOPROL-XL) 100 MG 24 hr tablet Take 1 tablet (100 mg total) by mouth once daily. 90 tablet 1    MULTIVITAMIN ORAL Take 1 tablet by mouth once daily.       nitroGLYCERIN (NITROSTAT) 0.4 MG SL tablet Place 1 tablet under the tongue as needed.       [DISCONTINUED] sildenafil (VIAGRA) 100 MG tablet Take 1 tablet by mouth.       No current facility-administered medications on file prior to visit.        Past Medical History:   Diagnosis Date    Allergy     seasonal    Arthritis     Basal cell carcinoma 10 years    right ear     Basal cell carcinoma 08/24/2017    Left earlobe     CAD (coronary artery disease) 11/29/2012    Hyperlipidemia     Hypertension     Joint pain     Squamous cell carcinoma 06/26/2017       Family History   Problem Relation Age of Onset    Skin cancer Mother     Leukemia Father     Diabetes Sister     Peripheral vascular disease  Sister     Cancer Brother     No Known Problems Daughter     No Known Problems Daughter     No Known Problems Daughter     No Known Problems Daughter     No Known Problems Son     No Known Problems Son         reports that he has quit smoking. he has never used smokeless tobacco. He reports that he drinks alcohol. He reports that he does not use drugs.    Review of Systems  see hpi  Objective:     Vitals:    10/18/18 0844   BP: 138/80   Pulse: (!) 58   Resp: 20   Temp: 97.6 °F (36.4 °C)        Physical Exam   Constitutional: He appears well-developed. No distress.   HENT:   Head: Normocephalic and atraumatic.   Eyes: Conjunctivae are normal. No scleral icterus.   Pulmonary/Chest: Effort normal.   Musculoskeletal:        Left foot: There is tenderness and deformity. There is no bony tenderness, no swelling, normal capillary refill and no crepitus.        Feet:    Varus deformity of L 5th toe with minimal ttp to the medial aspect where it contacts the 4th digit. Pain seems to be centered around the IP joint.    Neurological: He is alert.   Psychiatric: He has a normal mood and affect. His behavior is normal.   Nursing note and vitals reviewed.    nl cap refill in toes    Trace edema up to ankle on L side. No skin breakdown.       Assessment:       1. Pain of toe of left foot        Plan:       Aleks was seen today for toe pain.    Diagnoses and all orders for this visit:    Pain of toe of left foot  -     X-Ray Toe 2 View; Future    Will splint 5th toe with foam splint to provide separation and have patient f/u with podiatry. Pt agreed with plan.             Follow-up in about 4 weeks (around 11/15/2018).        Pt verbalized understanding and agreed with our plan.

## 2018-11-05 RX ORDER — DESONIDE 0.5 MG/G
CREAM TOPICAL
Qty: 60 G | Refills: 11 | Status: SHIPPED | OUTPATIENT
Start: 2018-11-05 | End: 2019-09-19 | Stop reason: SDUPTHER

## 2018-11-05 RX ORDER — CLOTRIMAZOLE AND BETAMETHASONE DIPROPIONATE 10; .64 MG/G; MG/G
CREAM TOPICAL
Qty: 45 G | Refills: 11 | Status: SHIPPED | OUTPATIENT
Start: 2018-11-05 | End: 2019-09-19 | Stop reason: SDUPTHER

## 2019-02-13 ENCOUNTER — OFFICE VISIT (OUTPATIENT)
Dept: OTOLARYNGOLOGY | Facility: CLINIC | Age: 84
End: 2019-02-13
Payer: MEDICARE

## 2019-02-13 ENCOUNTER — CLINICAL SUPPORT (OUTPATIENT)
Dept: AUDIOLOGY | Facility: CLINIC | Age: 84
End: 2019-02-13
Payer: MEDICARE

## 2019-02-13 VITALS
SYSTOLIC BLOOD PRESSURE: 144 MMHG | WEIGHT: 233 LBS | BODY MASS INDEX: 31.6 KG/M2 | TEMPERATURE: 96 F | DIASTOLIC BLOOD PRESSURE: 66 MMHG | HEART RATE: 50 BPM

## 2019-02-13 DIAGNOSIS — H90.3 SENSORINEURAL HEARING LOSS, BILATERAL: Primary | ICD-10-CM

## 2019-02-13 DIAGNOSIS — Z77.122 HISTORY OF EXPOSURE TO NOISE: ICD-10-CM

## 2019-02-13 DIAGNOSIS — G47.00 INSOMNIA, UNSPECIFIED TYPE: ICD-10-CM

## 2019-02-13 DIAGNOSIS — H93.293 IMPAIRMENT OF AUDITORY DISCRIMINATION OF BOTH EARS: ICD-10-CM

## 2019-02-13 DIAGNOSIS — H61.23 IMPACTED CERUMEN, BILATERAL: ICD-10-CM

## 2019-02-13 PROCEDURE — 92567 TYMPANOMETRY: CPT | Mod: PBBFAC | Performed by: AUDIOLOGIST

## 2019-02-13 PROCEDURE — 99999 PR PBB SHADOW E&M-EST. PATIENT-LVL III: ICD-10-PCS | Mod: PBBFAC,,, | Performed by: OTOLARYNGOLOGY

## 2019-02-13 PROCEDURE — 69210 PR REMOVAL IMPACTED CERUMEN REQUIRING INSTRUMENTATION, UNILATERAL: ICD-10-PCS | Mod: S$PBB,,, | Performed by: OTOLARYNGOLOGY

## 2019-02-13 PROCEDURE — 99999 PR PBB SHADOW E&M-EST. PATIENT-LVL III: CPT | Mod: PBBFAC,,, | Performed by: OTOLARYNGOLOGY

## 2019-02-13 PROCEDURE — 92557 COMPREHENSIVE HEARING TEST: CPT | Mod: PBBFAC | Performed by: AUDIOLOGIST

## 2019-02-13 PROCEDURE — 99203 OFFICE O/P NEW LOW 30 MIN: CPT | Mod: 25,S$PBB,, | Performed by: OTOLARYNGOLOGY

## 2019-02-13 PROCEDURE — 99213 OFFICE O/P EST LOW 20 MIN: CPT | Mod: PBBFAC,25 | Performed by: OTOLARYNGOLOGY

## 2019-02-13 PROCEDURE — 69210 REMOVE IMPACTED EAR WAX UNI: CPT | Mod: 50,PBBFAC | Performed by: OTOLARYNGOLOGY

## 2019-02-13 PROCEDURE — 69210 REMOVE IMPACTED EAR WAX UNI: CPT | Mod: S$PBB,,, | Performed by: OTOLARYNGOLOGY

## 2019-02-13 PROCEDURE — 99203 PR OFFICE/OUTPT VISIT, NEW, LEVL III, 30-44 MIN: ICD-10-PCS | Mod: 25,S$PBB,, | Performed by: OTOLARYNGOLOGY

## 2019-02-13 RX ORDER — LORAZEPAM 1 MG/1
TABLET ORAL
Qty: 30 TABLET | Refills: 2 | Status: SHIPPED | OUTPATIENT
Start: 2019-02-13 | End: 2019-08-19 | Stop reason: SDUPTHER

## 2019-02-13 NOTE — PROGRESS NOTES
Subjective:       Patient ID: Aleks Brown is a 86 y.o. male.    Chief Complaint: Hearing Loss    HPI: Mr. Brown is an 86-year-old  gentleman and ex [employer] who served in [place] for 4 years who presents today for hearing evaluation.  He has a significant history of noise exposure.  His hand written reason for the visit today is hearing test   He last completed audiometric study here in 2007 which indicated his significant high-frequency moderately severe to severe 3 - 8 K sensorineural hearing loss.  SRT scores measured 15 decibels for each ear with excellent bilateral discrimination scores demonstrated tested at  55 decibel hearing levels then.  He is interested in amplification as probably indicated today. He intends  seek help through the [facility] system re: hearing aid fitting.  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  He is proud arytenoid blood about the Kevin  family geneology which originated in [facility].  He was examined by an internist 10/02/2018.  He had noted some balance issues.  He can be walking in the an onset of a sensation of imbalance; he denies lightheadedness or joselito dizziness however.  He takes lorazepam in the evening for insomnia, a chronic problem.  Romberg testing was negative.  He was diagnosed with essential hypertension, coronary artery disease, insomnia, mixed hyperlipidemia, right-sided carotid artery disease, angina pectoris syndrome.    Past Medical History:   Diagnosis Date    Allergy     seasonal    Arthritis     Basal cell carcinoma 10 years    right ear     Basal cell carcinoma 08/24/2017    Left earlobe     CAD (coronary artery disease) 11/29/2012    Hyperlipidemia     Hypertension     Joint pain     Squamous cell carcinoma 06/26/2017     Past Surgical History:   Procedure Laterality Date    CATARACT EXTRACTION      CORONARY ARTERY BYPASS GRAFT      INSERTION-INTRAOCULAR LENS (IOL) Left 2/5/2015     [facility]ST FLR    INSERTION-INTRAOCULAR LENS (IOL) Right 1/15/2015    [facility]    PHACOEMULSIFICATION-ASPIRATION-CATARACT Left 2/5/2015    [facility] 1ST FLR    PHACOEMULSIFICATION-ASPIRATION-CATARACT Right 1/15/2015    [facility]ST FLR    Family history:  Heart disease, hearing loss  Allergies:  Iodine, trazodone, dye  Habits:  The patient quit smoking 30 years ago; 1 caffeinated drink per day      Review of Systems   Ears: Positive for hearing loss.    Nose:  Positive for postnasal drip.    Other:  Positive for rash.      His medical problem list includes carotid artery disease i.e. 60 69% right-sided forehead 49% left-sided, postsurgical AC bypass status, insomnia, essential hypertension, coronary artery disease, pure hypercholesterolemia, angina pectoris syndrome and posttraumatic stress disorder.      The patient completed an audiometric study performed by the [facility] audiology service after his ears were cleaned.  The study is duplicated below and the results are reviewed with him in detail  Objective:         Blood pressure 149/66 pulse 50 temperature 96.3° height 6 ft weight 233 lb  General:  Alert and oriented gentleman wearing a [employer] cap in no acute distress  Both ears were examined under the microscope in the micro procedure room  He semi occlusive cerumen impactions extracted from the right ear canal with a curette.  A small volume of cerumen is removed left ear canal.  Physical Exam   Constitutional: He is oriented to person, place, and time. He appears well-developed and well-nourished.   HENT:   Head: Normocephalic.   Right Ear: Hearing, tympanic membrane and ear canal normal. No drainage. No foreign bodies. No mastoid tenderness. Tympanic membrane is not perforated. No decreased hearing is noted.   Left Ear: Hearing, tympanic membrane and ear  canal normal. No drainage. No foreign bodies. No mastoid tenderness. Tympanic membrane is not perforated. No decreased hearing is noted.   Ears:    Nose: No nose lacerations, nasal deformity, septal deviation or nasal septal hematoma. No epistaxis. Right sinus exhibits no maxillary sinus tenderness and no frontal sinus tenderness. Left sinus exhibits no maxillary sinus tenderness and no frontal sinus tenderness.   Mouth/Throat: Uvula is midline, oropharynx is clear and moist and mucous membranes are normal. He does not have dentures. No oral lesions. No trismus in the jaw. No uvula swelling or dental caries. No oropharyngeal exudate or tonsillar abscesses.   Neck: No thyromegaly present.   Pulmonary/Chest: Effort normal. No stridor.   Lymphadenopathy:     He has no cervical adenopathy.   Neurological: He is alert and oriented to person, place, and time.   Skin: Rash noted.   Psychiatric: His behavior is normal.       Assessment:       1. Sensorineural hearing loss, bilateral    2. Impacted cerumen, bilateral    3. History of exposure to noise        Plan:         Cerumen impactions removed from both eacs with curette  Audiometry reviewed  Pt. is a candidate fo amplification for one or both ears  Copy of audiogram/FIFI Valerio's card/Rx to obtain hearing aid(s) provided  [facility] hearing aid program info provided  Monitor hearing yearly  Hearing protection encouraged prn

## 2019-02-13 NOTE — PATIENT INSTRUCTIONS
Cerumen impactions removed from both eacs with curette  Audiometry reviewed  Pt. is a candidate fo amplification for one or both ears  Copy of audiogram/FIFI Valerio's card/Rx to obtain hearing aid(s) provided  [facility] hearing aid program info provided  Monitor hearing yearly  Hearing protection encouraged prn

## 2019-03-21 ENCOUNTER — HOSPITAL ENCOUNTER (EMERGENCY)
Facility: HOSPITAL | Age: 84
Discharge: HOME | End: 2019-03-21
Attending: EMERGENCY MEDICINE
Payer: MEDICARE

## 2019-03-21 VITALS
TEMPERATURE: 98 F | DIASTOLIC BLOOD PRESSURE: 77 MMHG | HEART RATE: 56 BPM | BODY MASS INDEX: 30.52 KG/M2 | WEIGHT: 225 LBS | RESPIRATION RATE: 16 BRPM | SYSTOLIC BLOOD PRESSURE: 179 MMHG | OXYGEN SATURATION: 96 %

## 2019-03-21 DIAGNOSIS — T14.8XXA ABRASION: ICD-10-CM

## 2019-03-21 DIAGNOSIS — S06.0X0A CONCUSSION WITHOUT LOSS OF CONSCIOUSNESS, INITIAL ENCOUNTER: Primary | ICD-10-CM

## 2019-03-21 PROCEDURE — 99284 EMERGENCY DEPT VISIT MOD MDM: CPT | Mod: 25,ER

## 2019-03-21 PROCEDURE — 25000003 PHARM REV CODE 250: Mod: ER | Performed by: NURSE PRACTITIONER

## 2019-03-21 RX ADMIN — NEOMYCIN AND POLYMYXIN B SULFATES AND BACITRACIN ZINC 1 EACH: 400; 3.5; 5 OINTMENT TOPICAL at 12:03

## 2019-03-21 NOTE — ED PROVIDER NOTES
"Encounter Date: 3/21/2019       History     Chief Complaint   Patient presents with    Fall     Pt states," I fell on a cord at home and hit my face and nose."     Patient presents with abrasions to face and forehead after he tripped and fell forward over extension cords, striking pavement.  Patient states he did not lose consciousness but was dazed.  Patient states he has no pain at this time.  Daughter brought him and his at bedside.  Patient alert, oriented, and appears neurologically intact.  Patient denies any nausea, vomiting, headache, diplopia, tingling, numbness, neck pain, dizziness.          Review of patient's allergies indicates:   Allergen Reactions    Iodine and iodide containing products Anaphylaxis    Dye Other (See Comments)    Trazodone      dizziness     Past Medical History:   Diagnosis Date    Allergy     seasonal    Arthritis     Basal cell carcinoma 10 years    right ear     Basal cell carcinoma 08/24/2017    Left earlobe     CAD (coronary artery disease) 11/29/2012    Hyperlipidemia     Hypertension     Joint pain     Squamous cell carcinoma 06/26/2017     Past Surgical History:   Procedure Laterality Date    CATARACT EXTRACTION      CORONARY ARTERY BYPASS GRAFT      INSERTION-INTRAOCULAR LENS (IOL) Left 2/5/2015    [facility]formed [facility] at [facility] OR [facility]ST [facility]LR    INSERTION-INTRAOCULAR LENS (IOL) Right 1/15/2015    [facility] at [facility]    PHACOEMULSIFICATION-ASPIRATION-CATARACT Left 2/5/2015    [facility] at [facility]LR    PHACOEMULSIFICATION-ASPIRATION-CATARACT Right 1/15/2015    Performed by [facility] at [facility] OR [facility]ST FLR     Family History   Problem Relation Age of Onset    Skin cancer Mother     Leukemia Father     Diabetes Sister     Peripheral vascular disease Sister     Cancer Brother     No Known Problems Daughter     No Known Problems Daughter     No Known Problems Daughter     No " Known Problems Daughter     No Known Problems Son     No Known Problems Son      Social History     Tobacco Use    Smoking status: Former Smoker    Smokeless tobacco: Never Used   Substance Use Topics    Alcohol use: Yes     Comment: Red wine daily    Drug use: No     Review of Systems   Constitutional: Negative for fever.   HENT: Negative for sore throat.    Respiratory: Negative for shortness of breath.    Cardiovascular: Negative for chest pain.   Gastrointestinal: Negative for nausea.   Genitourinary: Negative for dysuria.   Musculoskeletal: Negative for back pain.   Skin: Negative for rash.        Abrasion to face, nose, and forehead   Neurological: Negative for weakness.   Hematological: Does not bruise/bleed easily.   All other systems reviewed and are negative.      Physical Exam     Initial Vitals [03/21/19 1245]   BP Pulse Resp Temp SpO2   (!) 196/74 71 16 98 °F (36.7 °C) 97 %      MAP       --         Physical Exam    Nursing note and vitals reviewed.  Constitutional: He appears well-developed and well-nourished. He is not diaphoretic.   HENT:   Head: Normocephalic and atraumatic.   Right Ear: External ear normal.   Left Ear: External ear normal.   Nose: Nose normal.   Mouth/Throat: Oropharynx is clear and moist. No oropharyngeal exudate.   Eyes: Conjunctivae and EOM are normal. Pupils are equal, round, and reactive to light. Right eye exhibits no discharge. Left eye exhibits no discharge. No scleral icterus.   Neck: Normal range of motion. Neck supple. No tracheal deviation present. No JVD present.   Cardiovascular: Normal rate, regular rhythm, normal heart sounds and intact distal pulses. Exam reveals no gallop and no friction rub.    No murmur heard.  Pulmonary/Chest: Breath sounds normal. No stridor. No respiratory distress. He has no wheezes. He has no rhonchi. He exhibits no tenderness.   Abdominal: Soft. Bowel sounds are normal. He exhibits no distension and no mass. There is no tenderness.  There is no guarding.   Musculoskeletal: Normal range of motion. He exhibits no edema.   Lymphadenopathy:     He has no cervical adenopathy.   Neurological: He is alert and oriented to person, place, and time. He has normal strength and normal reflexes. He displays normal reflexes. No cranial nerve deficit. GCS score is 15. GCS eye subscore is 4. GCS verbal subscore is 5. GCS motor subscore is 6.   Skin: No rash noted. There is erythema.   Abrasions noted to bridge of nose, cheeks, and forehead.   Psychiatric: He has a normal mood and affect. His behavior is normal. Judgment and thought content normal.         ED Course   Procedures  Labs Reviewed - No data to display       Imaging Results    None                               Clinical Impression:       ICD-10-CM ICD-9-CM   1. Concussion without loss of consciousness, initial encounter S06.0X0A 850.0   2. Abrasion T14.8XXA 919.0                                Beti Tomas, [facility]  03/21/19 1014

## 2019-03-21 NOTE — ED NOTES
States fell  Around 9 am tripped over an extension cord- abrasion on the forehead and nose - family at bedside- denies any pain at this itme

## 2019-04-03 ENCOUNTER — TELEPHONE (OUTPATIENT)
Dept: FAMILY MEDICINE | Facility: CLINIC | Age: 84
End: 2019-04-03

## 2019-04-03 NOTE — TELEPHONE ENCOUNTER
----- Message from Maricel Carreon sent at 4/2/2019  2:28 PM CDT -----  Contact: self  Type: Patient Call Back    Who called:Aleks    What is the request in detail: Pt asking for a call back from staff. Pt would not disclose reason for the call.     Can the clinic reply by [facility]? No    Would the patient rather a call back or a response via [facility]? Call back     Best call back number:[phone]

## 2019-06-20 ENCOUNTER — OFFICE VISIT (OUTPATIENT)
Dept: FAMILY MEDICINE | Facility: CLINIC | Age: 84
End: 2019-06-20
Payer: MEDICARE

## 2019-06-20 VITALS
WEIGHT: 228.19 LBS | RESPIRATION RATE: 18 BRPM | BODY MASS INDEX: 30.91 KG/M2 | DIASTOLIC BLOOD PRESSURE: 60 MMHG | TEMPERATURE: 98 F | SYSTOLIC BLOOD PRESSURE: 132 MMHG | HEART RATE: 76 BPM | HEIGHT: 72 IN | OXYGEN SATURATION: 96 %

## 2019-06-20 DIAGNOSIS — J02.9 ACUTE SORE THROAT: Primary | ICD-10-CM

## 2019-06-20 PROCEDURE — 99999 PR PBB SHADOW E&M-EST. PATIENT-LVL III: ICD-10-PCS | Mod: PBBFAC,,, | Performed by: FAMILY MEDICINE

## 2019-06-20 PROCEDURE — 99213 OFFICE O/P EST LOW 20 MIN: CPT | Mod: S$PBB,,, | Performed by: FAMILY MEDICINE

## 2019-06-20 PROCEDURE — 99213 PR OFFICE/OUTPT VISIT, EST, LEVL III, 20-29 MIN: ICD-10-PCS | Mod: S$PBB,,, | Performed by: FAMILY MEDICINE

## 2019-06-20 PROCEDURE — 99213 OFFICE O/P EST LOW 20 MIN: CPT | Mod: PBBFAC,PO | Performed by: FAMILY MEDICINE

## 2019-06-20 PROCEDURE — 99999 PR PBB SHADOW E&M-EST. PATIENT-LVL III: CPT | Mod: PBBFAC,,, | Performed by: FAMILY MEDICINE

## 2019-06-20 NOTE — PROGRESS NOTES
Chief Complaint   Patient presents with    Sore Throat       Aleks Brown is a 86 y.o. male who presents per the Chief Complaint.  Pt is known to me and was last seen by me on 9/24/2013.  All known chronic medical issues have been documented.       Sore Throat    This is a new problem. The current episode started in the past 7 days. The problem has been unchanged. The pain is worse on the left side. There has been no fever. The pain is at a severity of 4/10. The pain is mild. Pertinent negatives include no abdominal pain, congestion, coughing, diarrhea, drooling, ear discharge, ear pain, headaches, hoarse voice, plugged ear sensation, neck pain, shortness of breath, stridor, swollen glands, trouble swallowing or vomiting. He has had no exposure to strep or mono. He has tried nothing for the symptoms.        ROS  Review of Systems   Constitutional: Negative.  Negative for activity change, appetite change, chills, diaphoresis, fatigue, fever and unexpected weight change.   HENT: Positive for hearing loss and sore throat. Negative for congestion, drooling, ear discharge, ear pain, facial swelling, hoarse voice, mouth sores, nosebleeds, postnasal drip, rhinorrhea, sinus pressure, sinus pain, sneezing, tinnitus, trouble swallowing and voice change.    Eyes: Negative for pain and visual disturbance.   Respiratory: Negative for cough, choking, shortness of breath and stridor.    Cardiovascular: Negative for chest pain and leg swelling.   Gastrointestinal: Negative for abdominal pain, constipation, diarrhea, nausea and vomiting.   Genitourinary: Negative for difficulty urinating, dysuria, frequency and urgency.   Musculoskeletal: Negative.  Negative for arthralgias, back pain, gait problem, joint swelling, myalgias and neck pain.   Skin: Negative.    Allergic/Immunologic: Negative for environmental allergies and food allergies.   Neurological: Negative.  Negative for dizziness, seizures, syncope, weakness,  light-headedness and headaches.   Psychiatric/Behavioral: Positive for sleep disturbance. Negative for confusion, decreased concentration and dysphoric mood. The patient is not nervous/anxious.        Physical Exam  Vitals:    06/20/19 1603   BP: 132/60   Pulse: 76   Resp: 18   Temp: 97.6 °F (36.4 °C)    Body mass index is 30.95 kg/m².  Weight: 103.5 kg (228 lb 2.8 oz)   Height: 6' (182.9 cm)     Physical Exam   Constitutional: He is oriented to person, place, and time. He appears well-developed and well-nourished. He is active and cooperative.  Non-toxic appearance. He does not have a sickly appearance. He does not appear ill. No distress.   HENT:   Head: Normocephalic and atraumatic.   Right Ear: Hearing and external ear normal. No decreased hearing is noted.   Left Ear: Hearing and external ear normal. No decreased hearing is noted.   Nose: Nose normal. No rhinorrhea or nasal deformity.   Mouth/Throat: Uvula is midline, oropharynx is clear and moist and mucous membranes are normal. He does not have dentures. Normal dentition. Tonsils are 2+ on the right. Tonsils are 2+ on the left. No tonsillar exudate.   Eyes: Pupils are equal, round, and reactive to light. Conjunctivae, EOM and lids are normal. Right eye exhibits no chemosis, no discharge and no exudate. No foreign body present in the right eye. Left eye exhibits no chemosis, no discharge and no exudate. No foreign body present in the left eye. No scleral icterus.   Neck: Normal range of motion and full passive range of motion without pain. Neck supple.   Cardiovascular: Normal rate, regular rhythm, S1 normal, S2 normal and normal heart sounds. Exam reveals no gallop and no friction rub.   No murmur heard.  Pulmonary/Chest: Effort normal and breath sounds normal. No accessory muscle usage. No respiratory distress. He has no decreased breath sounds. He has no wheezes. He has no rhonchi. He has no rales.   Abdominal: Soft. Normal appearance. He exhibits no  distension. There is no hepatosplenomegaly. There is no tenderness. There is no rigidity, no rebound and no guarding.   Musculoskeletal: Normal range of motion.   Neurological: He is alert and oriented to person, place, and time. He has normal strength. No cranial nerve deficit or sensory deficit. He exhibits normal muscle tone. He displays no seizure activity. Coordination and gait normal.   Skin: Skin is warm, dry and intact. No rash noted. He is not diaphoretic.   Psychiatric: He has a normal mood and affect. His speech is normal and behavior is normal. Judgment and thought content normal. Cognition and memory are normal. He is attentive.       Assessment & Plan    Discussion of plan of care including treatment options regarding health and wellness were reviewed and discussed with patient.  Any changes to medication or treatment plan, as well as any screening blood test, imaging, or referrals to specialist, are documented.  Follow up as indicated.     1. Acute sore throat  No sign of infection.  Patient advised to avoid hot beverages while throat is inflamed or sore and is encouraged to drink room temperature to cold beverages and avoid hot foods as well.  If throat continues to be sore after other symptoms resolve, follow up for further evaluation.         Follow up if symptoms worsen or fail to improve.        ACTIVE MEDICAL ISSUES:  Documented in Problem List    PAST MEDICAL HISTORY  Documented    PAST SURGICAL HISTORY:  Documented    SOCIAL HISTORY:  Documented    FAMILY HISTORY:  Documented    ALLERGIES AND MEDICATIONS: updated and reviewed.  Documented    Health Maintenance       Date Due Completion Date    Shingles Vaccine (1 of 2) 11/11/1982 ---    Abdominal Aortic Aneurysm Screening 11/11/1997 ---    Influenza Vaccine 08/01/2019 10/2/2018    Aspirin/Antiplatelet Therapy 02/13/2020 2/13/2019    TETANUS VACCINE 07/13/2023 7/13/2013    Lipid Panel 10/02/2023 10/2/2018

## 2019-06-25 DIAGNOSIS — I10 ESSENTIAL HYPERTENSION: ICD-10-CM

## 2019-06-25 DIAGNOSIS — I25.10 CORONARY ARTERY DISEASE INVOLVING NATIVE CORONARY ARTERY WITHOUT ANGINA PECTORIS, UNSPECIFIED WHETHER NATIVE OR TRANSPLANTED HEART: ICD-10-CM

## 2019-06-25 RX ORDER — METOPROLOL SUCCINATE 100 MG/1
TABLET, EXTENDED RELEASE ORAL
Qty: 90 TABLET | Refills: 0 | Status: SHIPPED | OUTPATIENT
Start: 2019-06-25 | End: 2019-09-24 | Stop reason: SDUPTHER

## 2019-06-26 DIAGNOSIS — E78.2 MIXED HYPERLIPIDEMIA: ICD-10-CM

## 2019-06-26 DIAGNOSIS — I77.9 RIGHT-SIDED CAROTID ARTERY DISEASE, UNSPECIFIED TYPE: ICD-10-CM

## 2019-06-26 DIAGNOSIS — I10 ESSENTIAL HYPERTENSION: ICD-10-CM

## 2019-06-26 RX ORDER — LISINOPRIL 10 MG/1
TABLET ORAL
Qty: 90 TABLET | Refills: 1 | Status: SHIPPED | OUTPATIENT
Start: 2019-06-26 | End: 2019-12-18 | Stop reason: SDUPTHER

## 2019-06-26 RX ORDER — ATORVASTATIN CALCIUM 40 MG/1
TABLET, FILM COATED ORAL
Qty: 90 TABLET | Refills: 1 | Status: SHIPPED | OUTPATIENT
Start: 2019-06-26 | End: 2019-12-18 | Stop reason: SDUPTHER

## 2019-08-19 DIAGNOSIS — I25.10 CORONARY ARTERY DISEASE INVOLVING NATIVE CORONARY ARTERY WITHOUT ANGINA PECTORIS, UNSPECIFIED WHETHER NATIVE OR TRANSPLANTED HEART: Primary | ICD-10-CM

## 2019-08-19 DIAGNOSIS — G47.00 INSOMNIA, UNSPECIFIED TYPE: ICD-10-CM

## 2019-08-19 RX ORDER — LORAZEPAM 1 MG/1
TABLET ORAL
Qty: 30 TABLET | Refills: 0 | Status: SHIPPED | OUTPATIENT
Start: 2019-08-19 | End: 2019-09-19

## 2019-09-17 ENCOUNTER — LAB VISIT (OUTPATIENT)
Dept: LAB | Facility: HOSPITAL | Age: 84
End: 2019-09-17
Attending: INTERNAL MEDICINE
Payer: MEDICARE

## 2019-09-17 DIAGNOSIS — I25.10 CORONARY ARTERY DISEASE INVOLVING NATIVE CORONARY ARTERY WITHOUT ANGINA PECTORIS, UNSPECIFIED WHETHER NATIVE OR TRANSPLANTED HEART: ICD-10-CM

## 2019-09-17 LAB
ALBUMIN SERPL BCP-MCNC: 3.7 G/DL (ref 3.5–5.2)
ALP SERPL-CCNC: 106 U/L (ref 55–135)
ALT SERPL W/O P-5'-P-CCNC: 20 U/L (ref 10–44)
ANION GAP SERPL CALC-SCNC: 8 MMOL/L (ref 8–16)
AST SERPL-CCNC: 24 U/L (ref 10–40)
BASOPHILS # BLD AUTO: 0.05 K/UL (ref 0–0.2)
BASOPHILS NFR BLD: 0.6 % (ref 0–1.9)
BILIRUB SERPL-MCNC: 0.6 MG/DL (ref 0.1–1)
BUN SERPL-MCNC: 25 MG/DL (ref 8–23)
CALCIUM SERPL-MCNC: 9.4 MG/DL (ref 8.7–10.5)
CHLORIDE SERPL-SCNC: 104 MMOL/L (ref 95–110)
CHOLEST SERPL-MCNC: 113 MG/DL (ref 120–199)
CHOLEST/HDLC SERPL: 3.4 {RATIO} (ref 2–5)
CO2 SERPL-SCNC: 27 MMOL/L (ref 23–29)
CREAT SERPL-MCNC: 1 MG/DL (ref 0.5–1.4)
DIFFERENTIAL METHOD: ABNORMAL
EOSINOPHIL # BLD AUTO: 0.3 K/UL (ref 0–0.5)
EOSINOPHIL NFR BLD: 3.7 % (ref 0–8)
ERYTHROCYTE [DISTWIDTH] IN BLOOD BY AUTOMATED COUNT: 12.4 % (ref 11.5–14.5)
EST. GFR  (AFRICAN AMERICAN): >60 ML/MIN/1.73 M^2
EST. GFR  (NON AFRICAN AMERICAN): >60 ML/MIN/1.73 M^2
GLUCOSE SERPL-MCNC: 110 MG/DL (ref 70–110)
HCT VFR BLD AUTO: 42 % (ref 40–54)
HDLC SERPL-MCNC: 33 MG/DL (ref 40–75)
HDLC SERPL: 29.2 % (ref 20–50)
HGB BLD-MCNC: 13.1 G/DL (ref 14–18)
IMM GRANULOCYTES # BLD AUTO: 0.03 K/UL (ref 0–0.04)
IMM GRANULOCYTES NFR BLD AUTO: 0.4 % (ref 0–0.5)
LDLC SERPL CALC-MCNC: 61.2 MG/DL (ref 63–159)
LYMPHOCYTES # BLD AUTO: 2.2 K/UL (ref 1–4.8)
LYMPHOCYTES NFR BLD: 28.5 % (ref 18–48)
MCH RBC QN AUTO: 32.8 PG (ref 27–31)
MCHC RBC AUTO-ENTMCNC: 31.2 G/DL (ref 32–36)
MCV RBC AUTO: 105 FL (ref 82–98)
MONOCYTES # BLD AUTO: 1 K/UL (ref 0.3–1)
MONOCYTES NFR BLD: 12.8 % (ref 4–15)
NEUTROPHILS # BLD AUTO: 4.3 K/UL (ref 1.8–7.7)
NEUTROPHILS NFR BLD: 54 % (ref 38–73)
NONHDLC SERPL-MCNC: 80 MG/DL
NRBC BLD-RTO: 0 /100 WBC
PLATELET # BLD AUTO: 211 K/UL (ref 150–350)
PMV BLD AUTO: 14.1 FL (ref 9.2–12.9)
POTASSIUM SERPL-SCNC: 4.9 MMOL/L (ref 3.5–5.1)
PROT SERPL-MCNC: 6.8 G/DL (ref 6–8.4)
RBC # BLD AUTO: 3.99 M/UL (ref 4.6–6.2)
SODIUM SERPL-SCNC: 139 MMOL/L (ref 136–145)
TRIGL SERPL-MCNC: 94 MG/DL (ref 30–150)
WBC # BLD AUTO: 7.87 K/UL (ref 3.9–12.7)

## 2019-09-17 PROCEDURE — 80053 COMPREHEN METABOLIC PANEL: CPT

## 2019-09-17 PROCEDURE — 36415 COLL VENOUS BLD VENIPUNCTURE: CPT | Mod: PO

## 2019-09-17 PROCEDURE — 80061 LIPID PANEL: CPT

## 2019-09-17 PROCEDURE — 85025 COMPLETE CBC W/AUTO DIFF WBC: CPT

## 2019-09-18 PROBLEM — Z85.828 HISTORY OF SCC (SQUAMOUS CELL CARCINOMA) OF SKIN: Status: ACTIVE | Noted: 2019-09-18

## 2019-09-19 ENCOUNTER — OFFICE VISIT (OUTPATIENT)
Dept: FAMILY MEDICINE | Facility: CLINIC | Age: 84
End: 2019-09-19
Payer: MEDICARE

## 2019-09-19 VITALS
HEIGHT: 72 IN | DIASTOLIC BLOOD PRESSURE: 60 MMHG | TEMPERATURE: 98 F | SYSTOLIC BLOOD PRESSURE: 127 MMHG | BODY MASS INDEX: 30.79 KG/M2 | WEIGHT: 227.31 LBS | OXYGEN SATURATION: 97 % | HEART RATE: 64 BPM

## 2019-09-19 DIAGNOSIS — Z85.828 HISTORY OF SCC (SQUAMOUS CELL CARCINOMA) OF SKIN: ICD-10-CM

## 2019-09-19 DIAGNOSIS — I25.10 CORONARY ARTERY DISEASE INVOLVING NATIVE CORONARY ARTERY WITHOUT ANGINA PECTORIS, UNSPECIFIED WHETHER NATIVE OR TRANSPLANTED HEART: Primary | ICD-10-CM

## 2019-09-19 DIAGNOSIS — M21.371 RIGHT FOOT DROP: ICD-10-CM

## 2019-09-19 DIAGNOSIS — R21 RASH: ICD-10-CM

## 2019-09-19 DIAGNOSIS — Z23 NEEDS FLU SHOT: ICD-10-CM

## 2019-09-19 DIAGNOSIS — G47.00 INSOMNIA, UNSPECIFIED TYPE: ICD-10-CM

## 2019-09-19 DIAGNOSIS — E78.00 PURE HYPERCHOLESTEROLEMIA: ICD-10-CM

## 2019-09-19 DIAGNOSIS — I65.21 STENOSIS OF RIGHT CAROTID ARTERY: ICD-10-CM

## 2019-09-19 DIAGNOSIS — I20.9 ANGINA PECTORIS SYNDROME: ICD-10-CM

## 2019-09-19 DIAGNOSIS — I10 ESSENTIAL HYPERTENSION: ICD-10-CM

## 2019-09-19 PROCEDURE — 99214 PR OFFICE/OUTPT VISIT, EST, LEVL IV, 30-39 MIN: ICD-10-PCS | Mod: S$PBB,,, | Performed by: INTERNAL MEDICINE

## 2019-09-19 PROCEDURE — 99214 OFFICE O/P EST MOD 30 MIN: CPT | Mod: S$PBB,,, | Performed by: INTERNAL MEDICINE

## 2019-09-19 PROCEDURE — 99213 OFFICE O/P EST LOW 20 MIN: CPT | Mod: PBBFAC,PO | Performed by: INTERNAL MEDICINE

## 2019-09-19 PROCEDURE — 90662 IIV NO PRSV INCREASED AG IM: CPT | Mod: PBBFAC,PO

## 2019-09-19 PROCEDURE — 99999 PR PBB SHADOW E&M-EST. PATIENT-LVL III: ICD-10-PCS | Mod: PBBFAC,,, | Performed by: INTERNAL MEDICINE

## 2019-09-19 PROCEDURE — 99999 PR PBB SHADOW E&M-EST. PATIENT-LVL III: CPT | Mod: PBBFAC,,, | Performed by: INTERNAL MEDICINE

## 2019-09-19 RX ORDER — LORAZEPAM 0.5 MG/1
1 TABLET ORAL DAILY PRN
Qty: 30 TABLET | Refills: 2 | Status: SHIPPED | OUTPATIENT
Start: 2019-09-19 | End: 2019-09-19 | Stop reason: SDUPTHER

## 2019-09-19 RX ORDER — CLOTRIMAZOLE AND BETAMETHASONE DIPROPIONATE 10; .64 MG/G; MG/G
CREAM TOPICAL
Qty: 45 G | Refills: 2 | Status: SHIPPED | OUTPATIENT
Start: 2019-09-19 | End: 2020-09-04 | Stop reason: SDUPTHER

## 2019-09-19 RX ORDER — LORAZEPAM 0.5 MG/1
TABLET ORAL
Qty: 30 TABLET | Refills: 2 | Status: SHIPPED | OUTPATIENT
Start: 2019-09-19 | End: 2019-12-18 | Stop reason: SDUPTHER

## 2019-09-19 RX ORDER — DESONIDE 0.5 MG/G
1 CREAM TOPICAL 2 TIMES DAILY
Qty: 60 G | Refills: 2 | Status: SHIPPED | OUTPATIENT
Start: 2019-09-19 | End: 2019-09-26 | Stop reason: CLARIF

## 2019-09-19 NOTE — PROGRESS NOTES
This note was created by combination of typed  and Dragon dictation.  Transcription errors may be present.  If there are any questions, please contact me.    Assessment & Plan:   Coronary artery disease involving native coronary artery without angina pectoris, unspecified whether native or transplanted heart  Essential hypertension  Pure hypercholesterolemia  Angina pectoris syndrome  Stenosis of right carotid artery  -previous labs lipid profile excellent.  On statin.  On beta-blocker, Toprol XL, on ACE-inhibitor lisinopril, on aspirin.  No change.    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 [facility]  -we talked about how he is a fall risk.  He takes the Ativan at night mainly for insomnia.  Does not feel unsteady on his feet.  But he does understand my concern.  I had previously tried him on trazodone and it made him dizzy more than the lorazepam did.  But he is agreeable for trial of lower dose lorazepam.  He typically takes 1 mg, half tablet at night.  Trial of 0.5 mg, half tablet at night.  -     LORazepam (ATIVAN) 0.5 MG tablet; Take 2 tablets (1 mg total) by mouth daily as needed. 1/2 tablet at night  Dispense: 30 tablet; Refill: 2    Rash  -refilled Lotrisone for  area  Refill does not night for facial use  -     clotrimazole-betamethasone 1-0.05% (LOTRISONE) cream; apply topically to affected area 2 times daily as needed;  area  Dispense: 45 g; Refill: 2  -     desonide (DESOWEN) 0.05 % cream; Apply 1 application topically 2 (two) times daily. Apply to scaly areas on face  Dispense: 60 g; Refill: 2    Needs flu shot  -     Influenza - High Dose (65+) (PF) (IM)    Medications Discontinued During This Encounter   Medication Reason    LORazepam (ATIVAN) 1 MG tablet     clotrimazole-betamethasone 1-0.05% (LOTRISONE) cream Reorder    desonide (DESOWEN) 0.05 % cream Reorder     LORazepam (ATIVAN) 0.5 MG tablet Reorder       meds sent this encounter:  Medications Ordered This Encounter   Medications    clotrimazole-betamethasone 1-0.05% (LOTRISONE) cream     Sig: apply topically to affected area 2 times daily as needed;  area     Dispense:  45 g     Refill:  2    desonide (DESOWEN) 0.05 % cream     Sig: Apply 1 application topically 2 (two) times daily. Apply to scaly areas on face     Dispense:  60 g     Refill:  2    LORazepam (ATIVAN) 0.5 MG tablet     Si/2 tablet at night; this is correct sig     Dispense:  30 tablet     Refill:  2       Follow Up: Follow up in about 3 months (around 2019) for follow up chronic medical problem, non-fasting. follow up on lower dose lorazepam    Subjective:     Chief Complaint   Patient presents with    Medication Refill       HPI  Aleks is a 86 y.o. male, last appointment with this clinic was Visit date not found.    ER visit in March.  He had tripped on a cord and fell and hit his head.  Head CT was negative.  Felt to have concussion.  He saw ENT in February.  Sensorineural hearing loss and cerumen impaction.    [facility]  And started seeing a psychiatrist for PTSD; seeing therapy x 2-3 months. BIW.     [facility] and was told he has drop foot and frost bite hx of the feet.  Gave him a brace. And a cane.     previsit labs Hb mild decreased. Macrocytic. Stable seen previously.  CMP WNL. Lipid good.    We talked at length today about his lorazepam use.  He has been taking this longstanding at night.  For insomnia.  Still except mL a night.  He takes a half tablet at night.  Sometimes in the middle night he may take an extra dose.  Does not feel like it makes him field sedated or dizzy.  He has to get up in the middle of the night to urinate and does not feel unsteady on his feet.  I did express with him my concern however that this is a high risk medication.  He notes that I previously tried him on  trazodone and that the trazodone if anything made him feel more dizzy than the lorazepam does.  And ineffective.  But he would be willing to try a lower dose of the lorazepam.    Needs a refill of his topical creams.  He uses Lotrisone for  area and does a night for facial rash.  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 09/18/2019    PTSD (post-traumatic stress disorder) 10/23/2017    Essential hypertension 10/29/2015    Coronary artery disease involving native coronary artery without angina pectoris s/p CABG;  2011 [facility] 4/5 grafts patent 10/29/2015     s/p CABG x 5 1998  [facility] 2/2011 4/5 grafts patent      Insomnia hx trazodone ineffective and with SE 08/01/2014     10/2017 trazodone ineffective and SE of dizziness      Postsurgical aortocoronary bypass status 11/29/2012     CABG x 5 (1988 SVG to OM1 (sequential graft), SVG to OM2 (sequential         graft), LIMA to LAD (single graft), SVG to PDA (single graft), HUY to       D1 (single graft))       Carotid artery disease 11/06/2012     There is 60 - 69% right Internal Carotid stenosis.                           There is 40 - 49% left Internal Carotid stenosis.   July 2011      Pure hypercholesterolemia 11/06/2012    Angina pectoris syndrome 11/06/2012       PAST MEDICAL HISTORY:  Past Medical History:   Diagnosis Date    Allergy     seasonal    Arthritis     Basal cell carcinoma 10 years    right ear     Basal cell carcinoma 08/24/2017    Left earlobe     CAD (coronary artery disease) 11/29/2012    Hyperlipidemia     Hypertension     Joint pain     Squamous cell carcinoma  06/26/2017       PAST SURGICAL HISTORY:  Past Surgical History:   Procedure Laterality Date    CATARACT EXTRACTION      CORONARY ARTERY BYPASS GRAFT      INSERTION-INTRAOCULAR LENS (IOL) Left 2/5/2015    [facility]    INSERTION-INTRAOCULAR LENS (IOL) Right 1/15/2015    [facility]    PHACOEMULSIFICATION-ASPIRATION-CATARACT Left 2/5/2015    [facility]    PHACOEMULSIFICATION-ASPIRATION-CATARACT Right 1/15/2015    [facility]       SOCIAL HISTORY:  Social History     Socioeconomic History    Marital status:      Spouse name: Not on file    Number of children: Not on file    Years of education: Not on file    Highest education level: Not on file   Occupational History    Occupation: 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  .  Six kids. [employer].  [employer] [facility].      Social Needs    Financial resource strain: Not on file    Food insecurity:     Worry: Not on file     Inability: Not on file    Transportation needs:     Medical: Not on file     Non-medical: Not on file   Tobacco Use    Smoking status: Former Smoker    Smokeless tobacco: Never Used   Substance and Sexual Activity    Alcohol use: Yes     Comment: Red wine daily    Drug use: No    Sexual activity: Not on file   Lifestyle    Physical activity:     Days per week: Not on file     Minutes per session: Not on file    Stress: Not on file   Relationships    Social connections:     Talks on phone: Not on file     Gets together: Not on file     Attends Taoism service: Not on file     Active member of club or organization: Not on file     Attends meetings of clubs or organizations: Not on file     Relationship status: Not on file   Other Topics Concern    Not on file   Social History Narrative     x 60 yr.   [employer].         ALLERGIES AND MEDICATIONS:  updated and reviewed.  Review of patient's allergies indicates:   Allergen Reactions    Iodine and iodide containing products Anaphylaxis    Dye Other (See Comments)    Trazodone      dizziness     Current Outpatient Medications   Medication Sig Dispense Refill    aspirin (ASPIRIN LOW DOSE) 81 MG EC tablet Take 81 mg by mouth once daily.       atorvastatin (LIPITOR) 40 MG tablet TAKE 1 TABLET BY MOUTH ONCE A DAY 90 tablet 1    clotrimazole-betamethasone 1-0.05% (LOTRISONE) cream apply topically to affected area 2 times daily as needed 45 g 11    desonide (DESOWEN) 0.05 % cream APPLY TO SCALY AREA(S) ON FACE TWICE A DAY 60 g 11    lisinopril 10 MG tablet TAKE 1 TABLET BY MOUTH DAILY 90 tablet 1    LORazepam (ATIVAN) 1 MG tablet TAKE 1 TABLET BY MOUTH DAILY AS NEEDED 30 tablet 0    metoprolol succinate (TOPROL-XL) 100 MG 24 hr tablet TAKE 1 TABLET BY MOUTH ONCE A DAY 90 tablet 0    MULTIVITAMIN ORAL Take 1 tablet by mouth once daily.       nitroGLYCERIN (NITROSTAT) 0.4 MG SL tablet Place 1 tablet under the tongue as needed.        No current facility-administered medications for this visit.        Review of Systems   Constitutional: Negative for chills and fever.   Respiratory: Negative for shortness of breath.    Cardiovascular: Negative for chest pain.       Objective:   Physical Exam   Vitals:    09/19/19 1541   BP: 127/60   BP Location: Right arm   Patient Position: Sitting   BP Method: Large (Manual)   Pulse: 64   Temp: 97.5 °F (36.4 °C)   TempSrc: Oral   SpO2: 97%   Weight: 103.1 kg (227 lb 4.7 oz)   Height: 6' (1.829 m)    Body mass index is 30.83 kg/m².  Weight: 103.1 kg (227 lb 4.7 oz)   Height: 6' (182.9 cm)     Physical Exam   Constitutional: He is oriented to person, place, and time. He appears well-developed and well-nourished. No distress.   Eyes: EOM are normal.   Cardiovascular: Normal rate, regular rhythm and normal heart sounds.   No murmur heard.  Pulmonary/Chest: Effort normal and  breath sounds normal.   Musculoskeletal: Normal range of motion. He exhibits edema.   1+ edema to both ankles   Neurological: He is alert and oriented to person, place, and time. Coordination normal.   Skin: Skin is warm and dry.   Psychiatric: He has a normal mood and affect. His behavior is normal. Thought content normal.        Component      Latest Ref Rng & Units 9/17/2019 10/2/2018   WBC      3.90 - 12.70 K/uL 7.87    RBC      4.60 - 6.20 M/uL 3.99 (L)    Hemoglobin      14.0 - 18.0 g/dL 13.1 (L)    Hematocrit      40.0 - 54.0 % 42.0    MCV      82 - 98 fL 105 (H)    MCH      27.0 - 31.0 pg 32.8 (H)    MCHC      32.0 - 36.0 g/dL 31.2 (L)    RDW      11.5 - 14.5 % 12.4    Platelets      150 - 350 K/uL 211    MPV      9.2 - 12.9 fL 14.1 (H)    Immature Granulocytes      0.0 - 0.5 % 0.4    Gran # (ANC)      1.8 - 7.7 K/uL 4.3    Immature Grans (Abs)      0.00 - 0.04 K/uL 0.03    Lymph #      1.0 - 4.8 K/uL 2.2    Mono #      0.3 - 1.0 K/uL 1.0    Eos #      0.0 - 0.5 K/uL 0.3    Baso #      0.00 - 0.20 K/uL 0.05    nRBC      0 /100 WBC 0    Gran%      38.0 - 73.0 % 54.0    Lymph%      18.0 - 48.0 % 28.5    Mono%      4.0 - 15.0 % 12.8    Eosinophil%      0.0 - 8.0 % 3.7    Basophil%      0.0 - 1.9 % 0.6    Differential Method       Automated    Sodium      136 - 145 mmol/L 139 138   Potassium      3.5 - 5.1 mmol/L 4.9 4.7   Chloride      95 - 110 mmol/L 104 104   CO2      23 - 29 mmol/L 27 26   Glucose      70 - 110 mg/dL 110 105   BUN, Bld      8 - 23 mg/dL 25 (H) 26 (H)   Creatinine      0.5 - 1.4 mg/dL 1.0 1.0   Calcium      8.7 - 10.5 mg/dL 9.4 10.0   PROTEIN TOTAL      6.0 - 8.4 g/dL 6.8 7.0   Albumin      3.5 - 5.2 g/dL 3.7 3.9   BILIRUBIN TOTAL      0.1 - 1.0 mg/dL 0.6 0.8   Alkaline Phosphatase      55 - 135 U/L 106 91   AST      10 - 40 U/L 24 27   ALT      10 - 44 U/L 20 22   Anion Gap      8 - 16 mmol/L 8 8   eGFR if African American      >60 mL/min/1.73 m:2 >60.0 >60.0   eGFR if non African American       >60 mL/min/1.73 m:2 >60.0 >60.0   Cholesterol      120 - 199 mg/dL 113 (L) 123   Triglycerides      30 - 150 mg/dL 94 102   HDL      40 - 75 mg/dL 33 (L) 41   LDL Cholesterol External      63.0 - 159.0 mg/dL 61.2 (L) 61.6 (L)   Hdl/Cholesterol Ratio      20.0 - 50.0 % 29.2 33.3   Total Cholesterol/HDL Ratio      2.0 - 5.0 3.4 3.0   Non-HDL Cholesterol      mg/dL 80 82

## 2019-09-23 ENCOUNTER — PES CALL (OUTPATIENT)
Dept: ADMINISTRATIVE | Facility: CLINIC | Age: 84
End: 2019-09-23

## 2019-09-24 DIAGNOSIS — I10 ESSENTIAL HYPERTENSION: ICD-10-CM

## 2019-09-24 DIAGNOSIS — I25.10 CORONARY ARTERY DISEASE INVOLVING NATIVE CORONARY ARTERY WITHOUT ANGINA PECTORIS, UNSPECIFIED WHETHER NATIVE OR TRANSPLANTED HEART: ICD-10-CM

## 2019-09-24 RX ORDER — METOPROLOL SUCCINATE 100 MG/1
TABLET, EXTENDED RELEASE ORAL
Qty: 90 TABLET | Refills: 0 | Status: SHIPPED | OUTPATIENT
Start: 2019-09-24 | End: 2019-12-23

## 2019-09-26 ENCOUNTER — TELEPHONE (OUTPATIENT)
Dept: FAMILY MEDICINE | Facility: CLINIC | Age: 84
End: 2019-09-26

## 2019-09-26 DIAGNOSIS — R21 RASH: Primary | ICD-10-CM

## 2019-09-26 RX ORDER — FLUOCINOLONE ACETONIDE 0.25 MG/G
CREAM TOPICAL 2 TIMES DAILY
Qty: 60 G | Refills: 0 | Status: SHIPPED | OUTPATIENT
Start: 2019-09-26 | End: 2020-09-04 | Stop reason: SDUPTHER

## 2019-09-26 NOTE — TELEPHONE ENCOUNTER
Please call pt - desonide (steroid cream) not covered by insurance. I will call in alternative cream fluocinonide

## 2019-10-04 ENCOUNTER — TELEPHONE (OUTPATIENT)
Dept: FAMILY MEDICINE | Facility: CLINIC | Age: 84
End: 2019-10-04

## 2019-10-04 NOTE — TELEPHONE ENCOUNTER
----- Message from Emmie Valerio sent at 10/4/2019  4:31 PM CDT -----  Contact: erika with [employer] [facility] [phone]  Type: Patient Call Back    Who called:pt    What is the request in detail:desonide 0.05% cream - needs PA    Can the clinic reply by [facility]?    Would the patient rather a call back or a response via [facility]? Call back    Best call back number:erika with [employer] [facility] [phone]    Additional Information:

## 2019-10-14 ENCOUNTER — OFFICE VISIT (OUTPATIENT)
Dept: DERMATOLOGY | Facility: CLINIC | Age: 84
End: 2019-10-14
Payer: MEDICARE

## 2019-10-14 DIAGNOSIS — R23.8 VENOUS LAKE: ICD-10-CM

## 2019-10-14 DIAGNOSIS — L57.0 AK (ACTINIC KERATOSIS): ICD-10-CM

## 2019-10-14 DIAGNOSIS — Z85.828 HISTORY OF NONMELANOMA SKIN CANCER: ICD-10-CM

## 2019-10-14 DIAGNOSIS — D48.5 NEOPLASM OF UNCERTAIN BEHAVIOR OF SKIN: Primary | ICD-10-CM

## 2019-10-14 DIAGNOSIS — L82.1 SK (SEBORRHEIC KERATOSIS): ICD-10-CM

## 2019-10-14 PROCEDURE — 11103 TANGNTL BX SKIN EA SEP/ADDL: CPT | Mod: PBBFAC | Performed by: DERMATOLOGY

## 2019-10-14 PROCEDURE — 99999 PR PBB SHADOW E&M-EST. PATIENT-LVL III: CPT | Mod: PBBFAC,,, | Performed by: DERMATOLOGY

## 2019-10-14 PROCEDURE — 17000 DESTRUCT PREMALG LESION: CPT | Mod: 59,S$PBB,, | Performed by: DERMATOLOGY

## 2019-10-14 PROCEDURE — 88342 IMHCHEM/IMCYTCHM 1ST ANTB: CPT | Performed by: DERMATOLOGY

## 2019-10-14 PROCEDURE — 99213 OFFICE O/P EST LOW 20 MIN: CPT | Mod: 25,S$PBB,, | Performed by: DERMATOLOGY

## 2019-10-14 PROCEDURE — 17000 DESTRUCT PREMALG LESION: CPT | Mod: 59,PBBFAC | Performed by: DERMATOLOGY

## 2019-10-14 PROCEDURE — 99999 PR PBB SHADOW E&M-EST. PATIENT-LVL III: ICD-10-PCS | Mod: PBBFAC,,, | Performed by: DERMATOLOGY

## 2019-10-14 PROCEDURE — 99213 OFFICE O/P EST LOW 20 MIN: CPT | Mod: PBBFAC,25 | Performed by: DERMATOLOGY

## 2019-10-14 PROCEDURE — 99213 PR OFFICE/OUTPT VISIT, EST, LEVL III, 20-29 MIN: ICD-10-PCS | Mod: 25,S$PBB,, | Performed by: DERMATOLOGY

## 2019-10-14 PROCEDURE — 17003 DESTRUCT PREMALG LES 2-14: CPT | Mod: 59,S$PBB,, | Performed by: DERMATOLOGY

## 2019-10-14 PROCEDURE — 88305 TISSUE SPECIMEN TO PATHOLOGY, DERMATOLOGY: ICD-10-PCS | Mod: 26,,, | Performed by: DERMATOLOGY

## 2019-10-14 PROCEDURE — 88305 TISSUE EXAM BY PATHOLOGIST: CPT | Mod: 59 | Performed by: DERMATOLOGY

## 2019-10-14 PROCEDURE — 11102 PR TANGENTIAL BIOPSY, SKIN, SINGLE LESION: ICD-10-PCS | Mod: S$PBB,,, | Performed by: DERMATOLOGY

## 2019-10-14 PROCEDURE — 11103 PR TANGENTIAL BIOPSY, SKIN, EA ADDTL LESION: ICD-10-PCS | Mod: S$PBB,,, | Performed by: DERMATOLOGY

## 2019-10-14 PROCEDURE — 11102 TANGNTL BX SKIN SINGLE LES: CPT | Mod: S$PBB,,, | Performed by: DERMATOLOGY

## 2019-10-14 PROCEDURE — 11103 TANGNTL BX SKIN EA SEP/ADDL: CPT | Mod: S$PBB,,, | Performed by: DERMATOLOGY

## 2019-10-14 PROCEDURE — 17003 DESTRUCTION, PREMALIGNANT LESIONS; SECOND THROUGH 14 LESIONS: ICD-10-PCS | Mod: 59,S$PBB,, | Performed by: DERMATOLOGY

## 2019-10-14 PROCEDURE — 11102 TANGNTL BX SKIN SINGLE LES: CPT | Mod: PBBFAC | Performed by: DERMATOLOGY

## 2019-10-14 PROCEDURE — 17003 DESTRUCT PREMALG LES 2-14: CPT | Mod: 59,PBBFAC | Performed by: DERMATOLOGY

## 2019-10-14 PROCEDURE — 17000 PR DESTRUCTION(LASER SURGERY,CRYOSURGERY,CHEMOSURGERY),PREMALIGNANT LESIONS,FIRST LESION: ICD-10-PCS | Mod: 59,S$PBB,, | Performed by: DERMATOLOGY

## 2019-10-14 NOTE — PROGRESS NOTES
Subjective:       Patient ID:  Aleks Brown is a 86 y.o. male who presents for   Chief Complaint   Patient presents with    Skin Check     UBSE     History of Present Illness: The patient presents for follow up of skin check.    The patient was last seen on: 5/8/18 for cryosurgery to actinic keratoses which have resolved.   This is a high risk patient here to check for the development of new lesions.    Other skin complaints:   Patient complains of lesion(s)  Location: right cheek  Duration: many years  Symptoms: scaling  Relieving factors/Previous treatments: none          Review of Systems   Skin: Positive for wears hat (always). Negative for daily sunscreen use, activity-related sunscreen use and recent sunburn.   Hematologic/Lymphatic: Bruises/bleeds easily (on asa).        Objective:    Physical Exam   Constitutional: He appears well-developed and well-nourished. He is obese.  No distress.   Neurological: He is alert and oriented to person, place, and time. He is not disoriented.   Psychiatric: He has a normal mood and affect.   Skin:   Areas Examined (abnormalities noted in diagram):   Scalp / Hair Palpated and Inspected  Head / Face Inspection Performed  Neck Inspection Performed  Chest / Axilla Inspection Performed  Back Inspection Performed  RUE Inspected  LUE Inspection Performed                       Diagram Legend     Erythematous scaling macule/papule c/w actinic keratosis       Vascular papule c/w angioma      Pigmented verrucoid papule/plaque c/w seborrheic keratosis      Yellow umbilicated papule c/w sebaceous hyperplasia      Irregularly shaped tan macule c/w lentigo     1-2 mm smooth white papules consistent with Milia      Movable subcutaneous cyst with punctum c/w epidermal inclusion cyst      Subcutaneous movable cyst c/w pilar cyst      Firm pink to brown papule c/w dermatofibroma      Pedunculated fleshy papule(s) c/w skin tag(s)      Evenly pigmented macule c/w junctional nevus      Mildly variegated pigmented, slightly irregular-bordered macule c/w mildly atypical nevus      Flesh colored to evenly pigmented papule c/w intradermal nevus       Pink pearly papule/plaque c/w basal cell carcinoma      Erythematous hyperkeratotic cursted plaque c/w SCC      Surgical scar with no sign of skin cancer recurrence      Open and closed comedones      Inflammatory papules and pustules      Verrucoid papule consistent consistent with wart     Erythematous eczematous patches and plaques     Dystrophic onycholytic nail with subungual debris c/w onychomycosis     Umbilicated papule    Erythematous-base heme-crusted tan verrucoid plaque consistent with inflamed seborrheic keratosis     Erythematous Silvery Scaling Plaque c/w Psoriasis     See annotation              Assessment / Plan:      Pathology Orders:     Normal Orders This Visit    Tissue Specimen To Pathology, Dermatology     Questions:    Directional Terms:  Other(comment)    Clinical Information:  r/o scc vs isk vs sk    Specific Site:  right cheek    Tissue Specimen To Pathology, Dermatology     Questions:    Directional Terms:  Other(comment)    Clinical Information:  r/o bcc vs idn    Specific Site:  left wrist        Neoplasm of uncertain behavior of skin  -     Tissue Specimen To Pathology, Dermatology  -     Tissue Specimen To Pathology, Dermatology    Shave biopsy procedure note:    Shave biopsy x 2 performed after verbal consent including risk of infection, scar, recurrence, need for additional treatment of site. Area prepped with alcohol, anesthetized with approximately 1.0cc of 1% lidocaine with epinephrine. Lesional tissue shaved with razor blade. Hemostasis achieved with application of aluminum chloride followed by hyfrecation. No complications. Dressing applied. Wound care explained.    If face biopsy positive for malignancy, refer to Dr. Rashid for Mohs surgery consultation.  If wrist biopsy positive, schedule procedure for definitive  excision.       AK (actinic keratosis)  Cryosurgery Procedure Note    Verbal consent from the patient is obtained including, but not limited to, risk of hypopigmentation/hyperpigmentation, scar, recurrence of lesion. The patient is aware of the precancerous quality and need for treatment of these lesions. Liquid nitrogen cryosurgery is applied to the 8 actinic keratoses, as detailed in the physical exam, to produce a freeze injury. The patient is aware that blisters may form and is instructed on wound care with gentle cleansing and use of vaseline ointment to keep moist until healed. The patient is supplied a handout on cryosurgery and is instructed to call if lesions do not completely resolve.      SK (seborrheic keratosis)  These are benign inherited growths without a malignant potential. Reassurance given to patient. No treatment is necessary.       Venous lake   - stable and chronic      History of nonmelanoma skin cancer  Area(s) of previous NMSC evaluated with no signs of recurrence.    Upper body skin examination performed today including at least 6 points as noted in physical examination. Suspicious lesions noted.               Follow up in about 6 months (around 4/14/2020).

## 2019-10-14 NOTE — PATIENT INSTRUCTIONS

## 2019-10-23 ENCOUNTER — TELEPHONE (OUTPATIENT)
Dept: DERMATOLOGY | Facility: CLINIC | Age: 84
End: 2019-10-23

## 2019-10-23 NOTE — TELEPHONE ENCOUNTER
----- Message from Claudia Evans sent at 10/23/2019 12:46 PM CDT -----  Contact: pt at [phone]  Type:  Patient Returning Call    Who Called:pt  Who Left Message for Patient:Jam  Does the patient know what this is regarding?:yes  Would the patient rather a call back or a response via [facility]? call  Best Call Back Number:[phone]  Additional Information:

## 2019-11-14 ENCOUNTER — PROCEDURE VISIT (OUTPATIENT)
Dept: DERMATOLOGY | Facility: CLINIC | Age: 84
End: 2019-11-14
Payer: MEDICARE

## 2019-11-14 DIAGNOSIS — C44.619 BASAL CELL CARCINOMA (BCC) OF SKIN OF LEFT WRIST: Primary | ICD-10-CM

## 2019-11-14 PROCEDURE — 13121 CMPLX RPR S/A/L 2.6-7.5 CM: CPT | Mod: S$PBB,,, | Performed by: DERMATOLOGY

## 2019-11-14 PROCEDURE — 13121 CMPLX RPR S/A/L 2.6-7.5 CM: CPT | Mod: PBBFAC | Performed by: DERMATOLOGY

## 2019-11-14 PROCEDURE — 11602 PR EXC SKIN MALIG 1.1-2 CM TRUNK,ARM,LEG: ICD-10-PCS | Mod: S$PBB,59,, | Performed by: DERMATOLOGY

## 2019-11-14 PROCEDURE — 99499 NO LOS: ICD-10-PCS | Mod: S$PBB,,, | Performed by: DERMATOLOGY

## 2019-11-14 PROCEDURE — 11602 EXC TR-EXT MAL+MARG 1.1-2 CM: CPT | Mod: S$PBB,59,, | Performed by: DERMATOLOGY

## 2019-11-14 PROCEDURE — 99499 UNLISTED E&M SERVICE: CPT | Mod: S$PBB,,, | Performed by: DERMATOLOGY

## 2019-11-14 PROCEDURE — 88305 TISSUE EXAM BY PATHOLOGIST: CPT | Performed by: PATHOLOGY

## 2019-11-14 PROCEDURE — 13121 PR RECMPL WND SCALP,EXTR 2.6-7.5 CM: ICD-10-PCS | Mod: S$PBB,,, | Performed by: DERMATOLOGY

## 2019-11-14 PROCEDURE — 88305 TISSUE EXAM BY PATHOLOGIST: ICD-10-PCS | Mod: 26,,, | Performed by: PATHOLOGY

## 2019-11-14 PROCEDURE — 11602 EXC TR-EXT MAL+MARG 1.1-2 CM: CPT | Mod: PBBFAC | Performed by: DERMATOLOGY

## 2019-11-14 PROCEDURE — 88305 TISSUE EXAM BY PATHOLOGIST: CPT | Mod: 26,,, | Performed by: PATHOLOGY

## 2019-11-14 NOTE — PATIENT INSTRUCTIONS
"Post- Operative Wound Care    Your doctor has performed local skin surgery today.  Vaseline ointment and a pressure bandage were placed after the surgery.  It is very important that you keep this bandage in place for 24 hours.  This will decrease the risk of post - operative infection and bleeding.  After 24 hours, you may remove the band aid and wash the area with warm soap and water and apply Vaseline ointment.  Many patients prefer to use Neosporin or Bacitracin ointment.  This is acceptable; however know that you can develop an allergy to this medication even if you have used it safely for years.  It is important to keep the area moist.  Letting it dry out and get air slows healing time, will worsen the scar, and make it more difficult to remove the stitches.  Band aid is optional after first 24 hours.        If you notice increasing redness, tenderness, pain, or yellow drainage at the biopsy or surgical site, please notify your doctor.  These are signs of an infection.    If your biopsy/surgical site is bleeding, apply firm pressure for 15 minutes straight.  Repeat for another 15 minutes, if it is still bleeding.   If the surgical site continues to bleed, then please contact your doctor.      For [facility] users:   You will receive a [facility] notification after the pathologist has finished reviewing your biopsy specimen. Pathology results, however, will not be released online so you will see a "no content" message. Once your dermatologist reviews and clinically correlates your biopsy results, you will either receive a letter in the mail with the results of a phone call from your doctor's office if further explanation or treatment is warranted.       [address] [place] [address]/ [phone] FAX/ [email or web address]           "

## 2019-11-14 NOTE — PROGRESS NOTES
PROCEDURE: Elliptical excision with complex layered repair in order to decrease dead space, decrease tension and close large gap.    ANESTHETIC: 9 cc 1% Xylocaine with Epinephrine 1:100,000, buffered    SURGEON:  Gillian Williamson M.D.    ASSISTANTS:  Kinza Gayle MA    PREOPERATIVE DIAGNOSIS:  Biopsy-proven Basal Cell Carcinoma    POSTOPERATIVE DIAGNOSIS:  Same as preoperative diagnosis    PATHOLOGIC DIAGNOSIS: Pending    LOCATION: left forearm    INITIAL LESION SIZE: 0.8 x 1.2 cm    EXCISED DIAMETER: 2.0 cm    PREPARATION: The diagnosis, procedure, alternatives, benefits and risks, including but not limited to: infection, bleeding/bruising, drug reactions, pain, scar or cosmetic defect, local sensation disturbances, wound dehiscence (separation of wound edges after sutures removed) and/or recurrence of present condition were explained to the patient. The patient elected to proceed.  Patient's identity was verified and the site was verified.    PROCEDURE: The location noted above was prepped, draped, and anesthetized in the usual sterile fashion per Kinza Gayle MA. Lesional tissue was carefully marked with at least 4 mm margins of clinically normal skin in all directions. A fusiform elliptical excision was done with #15 blade carried down completely through the dermis into the deep subcutaneous tissues to the level of the non-muscle fascia, and dissection was carried out in that plane. The wound was widely undermined in all directions. Electrocoagulation was used to obtain hemostasis. Blood loss was minimal. The wound was then approximated in a layered fashion with subcutaneous and intradermal sutures of 4.0 Monocryl, approximately 3 in number, and the wound was then superficially closed with simple interrupted sutures of 4.0 Prolene.    The patient tolerated the procedure well.    The area was cleaned and dressed appropriately and the patient was given wound care instructions, as well as an appointment for  follow-up evaluation.    LENGTH OF REPAIR: 4.0 cm

## 2019-11-29 ENCOUNTER — CLINICAL SUPPORT (OUTPATIENT)
Dept: DERMATOLOGY | Facility: CLINIC | Age: 84
End: 2019-11-29
Payer: MEDICARE

## 2019-11-29 PROCEDURE — 99024 PR POST-OP FOLLOW-UP VISIT: ICD-10-PCS | Mod: POP,,, | Performed by: DERMATOLOGY

## 2019-11-29 PROCEDURE — 99024 POSTOP FOLLOW-UP VISIT: CPT | Mod: POP,,, | Performed by: DERMATOLOGY

## 2019-11-29 NOTE — PROGRESS NOTES
Suture Removal note:  CC: 87 y.o. male patient is here for suture removal.         HPI: Patient is s/p excision of Basal cell carcinoma from the left wrist on 11/14/2019.  Patient reports no problems.    FINAL PATHOLOGIC DIAGNOSIS  1. Skin, right cheek, biopsy:  -FOCAL SQUAMOUS CELL CARCINOMA IN-SITU, ARISING IN A BACKGROUND OF A HYPERTROPHIC  ACTINIC KERATOSIS, EXTENDING TO THE PERIPHERAL MARGINS  2. Skin, left wrist, biopsy:  -BASAL CELL CARCINOMA, MORPHEIC TYPE, EXTENDING TO THE DEEP AND PERIPHERAL MARGINS  This case was reviewed with Dr. Joy Reynolds who agrees with the above diagnosis.  Diagnosed by: Mary Baker  (Electronically Signed: 2019-10-18 09:52:10)    WOUND PE:  Sutures intact.  Wound healing well.  Good approximation of skin edges.  No signs or symptoms of infection.    IMPRESSION:  Excision of Basal cell carcinoma from the left wrist on 11/14/2019.     PLAN:  Sutures removed today.  Continue wound care.    RTC: In 6 months.

## 2019-12-03 LAB
FINAL PATHOLOGIC DIAGNOSIS: NORMAL
GROSS: NORMAL
MICROSCOPIC EXAM: NORMAL

## 2019-12-18 DIAGNOSIS — E78.2 MIXED HYPERLIPIDEMIA: ICD-10-CM

## 2019-12-18 DIAGNOSIS — I10 ESSENTIAL HYPERTENSION: ICD-10-CM

## 2019-12-18 DIAGNOSIS — I77.9 RIGHT-SIDED CAROTID ARTERY DISEASE, UNSPECIFIED TYPE: ICD-10-CM

## 2019-12-18 DIAGNOSIS — G47.00 INSOMNIA, UNSPECIFIED TYPE: ICD-10-CM

## 2019-12-18 RX ORDER — ATORVASTATIN CALCIUM 40 MG/1
TABLET, FILM COATED ORAL
Qty: 90 TABLET | Refills: 1 | Status: SHIPPED | OUTPATIENT
Start: 2019-12-18 | End: 2020-01-16 | Stop reason: SDUPTHER

## 2019-12-18 RX ORDER — LISINOPRIL 10 MG/1
TABLET ORAL
Qty: 90 TABLET | Refills: 1 | Status: SHIPPED | OUTPATIENT
Start: 2019-12-18 | End: 2020-01-16 | Stop reason: SDUPTHER

## 2019-12-18 RX ORDER — LORAZEPAM 0.5 MG/1
TABLET ORAL
Qty: 30 TABLET | Refills: 0 | Status: SHIPPED | OUTPATIENT
Start: 2019-12-18 | End: 2020-01-16 | Stop reason: SDUPTHER

## 2019-12-23 DIAGNOSIS — I25.10 CORONARY ARTERY DISEASE INVOLVING NATIVE CORONARY ARTERY WITHOUT ANGINA PECTORIS, UNSPECIFIED WHETHER NATIVE OR TRANSPLANTED HEART: ICD-10-CM

## 2019-12-23 DIAGNOSIS — I10 ESSENTIAL HYPERTENSION: ICD-10-CM

## 2019-12-23 RX ORDER — METOPROLOL SUCCINATE 100 MG/1
TABLET, EXTENDED RELEASE ORAL
Qty: 90 TABLET | Refills: 0 | Status: SHIPPED | OUTPATIENT
Start: 2019-12-23 | End: 2020-01-16 | Stop reason: SDUPTHER

## 2020-01-15 PROBLEM — F13.20 BENZODIAZEPINE DEPENDENCE: Status: ACTIVE | Noted: 2020-01-15

## 2020-01-15 NOTE — PROGRESS NOTES
This note was created by combination of typed  and M-Modal dictation.  Transcription errors may be present.  If there are any questions, please contact me.    Assessment & Plan:   Insomnia, unspecified type  Benzodiazepine dependence  PTSD  -long discussion with pt about risks of medication vs quality of life. Underlying PTSD.  Longstanding issues with insomnia.  Had tried 0.25 mg without relief. Finds that this helps with his overall quality of life.  No fall since started using cane.  Has underlying drop foot.  For now no changes.  Stay on 0.5 mg.  If he ever finds that he is unsteady, or sleeping longer - we agreed that we would cut down the dose.  But for now no change.  Close follow up.  [facility] - [facility] to get insight into his habits.  -     LORazepam (ATIVAN) 0.5 MG tablet; Take 1 tablet (0.5 mg total) by mouth every evening. TAKE ONE-HALF TABLET BY MOUTH AT NIGHT  Dispense: 90 tablet; Refill: 0    Coronary artery disease involving native coronary artery without angina pectoris, unspecified whether native or transplanted heart  Angina pectoris syndrome  Mixed hyperlipidemia  Right-sided carotid artery disease, unspecified type  -stable. On beta blocker and statin. No changes. Refilled to pharmacy 9/2019 labs were good.  -     metoprolol succinate (TOPROL-XL) 100 MG 24 hr tablet; Take 1 tablet (100 mg total) by mouth once daily.  Dispense: 90 tablet; Refill: 3  -     atorvastatin (LIPITOR) 40 MG tablet; Take 1 tablet (40 mg total) by mouth once daily.  Dispense: 90 tablet; Refill: 3    Essential hypertension  -stable, on lisinopril and metoprolol, no changes. Refilled.   -     lisinopril 10 MG tablet; Take 1 tablet (10 mg total) by mouth once daily.  Dispense: 90 tablet; Refill: 3  -     metoprolol succinate (TOPROL-XL) 100 MG 24 hr tablet; Take 1 tablet (100 mg total) by mouth once daily.  Dispense: 90 tablet; Refill: 3      Medications Discontinued During This Encounter    Medication Reason    LORazepam (ATIVAN) 0.5 MG tablet Reorder    atorvastatin (LIPITOR) 40 MG tablet Reorder    lisinopril 10 MG tablet Reorder    metoprolol succinate (TOPROL-XL) 100 MG 24 hr tablet Reorder       meds sent this encounter:  Medications Ordered This Encounter   Medications    atorvastatin (LIPITOR) 40 MG tablet     Sig: Take 1 tablet (40 mg total) by mouth once daily.     Dispense:  90 tablet     Refill:  3     This prescription was filled on 12/18/2019. Any refills authorized will be placed on file.    lisinopril 10 MG tablet     Sig: Take 1 tablet (10 mg total) by mouth once daily.     Dispense:  90 tablet     Refill:  3     This prescription was filled on 12/18/2019. Any refills authorized will be placed on file.    LORazepam (ATIVAN) 0.5 MG tablet     Sig: Take 1 tablet (0.5 mg total) by mouth every evening. TAKE ONE-HALF TABLET BY MOUTH AT NIGHT     Dispense:  90 tablet     Refill:  0     This prescription was filled on 12/18/2019. Any refills authorized will be placed on file.    metoprolol succinate (TOPROL-XL) 100 MG 24 hr tablet     Sig: Take 1 tablet (100 mg total) by mouth once daily.     Dispense:  90 tablet     Refill:  3       Follow Up: No follow-ups on file. f/u 3 months; he'll schedule this at his convenience. For follow up of the BZD monitoring for adverse effects    Subjective:     Chief Complaint   Patient presents with    Insomnia       HPI  Aleks is a 87 y.o. male, last appointment with this clinic was 9/19/2019.    Last visit - CAD, HTN, lipid, stable. No changes  Insomnia, hx of ativan I was worried he was high risk for falls.   Trial of lower dose lorazepam 0.25 mg nightly. Goal to wean off.      12/18 lorazeoam 0.5 #30. Has been filling monthly    He tried cutting the pills in half (0.25 mg) and tried that for 30 days. But not sleeping as well.  Early waking.  With 0.5 feels like he is sleeping better.  Notes lifelong issues with sleep maintenance. No  dizziness.  No somnolence in the daytime.  When he wakes in the middle of the night, makes sure he sits up for a moment before getting up. Mind racing.  Memories of his [employer] days.     But notes hx of frostbite with subsequent nerve damage to the leg and subsequent fall risk with drop foot. Uses cane. And leg brace.  With the cane, no falls subsequently.  [facility]  And [facility] for PTSD    [facility] ongoing.   Had [facility] cards [facility] and I don't think he continues to see them.    No chest pain, no dyspnea.  Energy is OK.    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 [facility]/2017    Essential hypertension 10/29/2015    Coronary artery disease involving native coronary artery without angina pectoris s/p CABG;  2011 [facility] 4/5 grafts patent 10/29/2015     s/p CABG x 5 1998  [facility] 2/2011 4/5 grafts patent      Insomnia hx trazodone ineffective and with SE 08/01/2014     10/2017 trazodone ineffective and SE of dizziness      Postsurgical aortocoronary bypass status 11/29/2012     CABG x 5 (1988 SVG to OM1 (sequential graft), SVG to OM2 (sequential         graft), LIMA to LAD (single graft), SVG to PDA (single graft), HUY to       D1 (single graft))       Bilateral carotid artery stenosis 11/06/2012     There is 60 - 69% right Internal Carotid stenosis.                           There is 40 - 49% left Internal  Carotid stenosis.   July 2011      Pure hypercholesterolemia 11/06/2012    Angina pectoris syndrome 11/06/2012       PAST MEDICAL HISTORY:  Past Medical History:   Diagnosis Date    Allergy     seasonal    Arthritis     Basal cell carcinoma 10 years    right ear     Basal cell carcinoma 08/24/2017    Left earlobe     CAD (coronary artery disease) 11/29/2012    Hyperlipidemia     Hypertension     Joint pain     Squamous cell carcinoma 06/26/2017       PAST SURGICAL HISTORY:  Past Surgical History:   Procedure Laterality Date    CATARACT EXTRACTION      CORONARY ARTERY BYPASS GRAFT         SOCIAL HISTORY:  Social History     Socioeconomic History    Marital status:      Spouse name: Not on file    Number of children: Not on file    Years of education: Not on file    Highest education level: Not on file   Occupational History    Occupation: retired from: [employer].  .  Six kids. [place] [employer] vet.  [employer] [facility] [place].      Social Needs    Financial resource strain: Not on file    Food insecurity:     Worry: Not on file     Inability: Not on file    Transportation needs:     Medical: Not on file     Non-medical: Not on file   Tobacco Use    Smoking status: Former Smoker    Smokeless tobacco: Never Used   Substance and Sexual Activity    Alcohol use: Yes     Comment: Red wine daily    Drug use: No    Sexual activity: Not on file   Lifestyle    Physical activity:     Days per week: Not on file     Minutes per session: Not on file    Stress: Not on file   Relationships    Social connections:     Talks on phone: Not on file     Gets together: Not on file     Attends Rastafari service: Not on file     Active member of club or organization: Not on file     Attends meetings of clubs or organizations: Not on file     Relationship status: Not on file   Other Topics Concern    Not on file   Social History Narrative     x 60 yr.   [employer].         ALLERGIES AND MEDICATIONS:  updated and reviewed.  Review of patient's allergies indicates:   Allergen Reactions    Iodine and iodide containing products Anaphylaxis    Dye Other (See Comments)    Trazodone      dizziness     Current Outpatient Medications   Medication Sig Dispense Refill    aspirin (ASPIRIN LOW DOSE) 81 MG EC tablet Take 81 mg by mouth once daily.       atorvastatin (LIPITOR) 40 MG tablet TAKE 1 TABLET BY MOUTH ONCE A DAY 90 tablet 1    clotrimazole-betamethasone 1-0.05% (LOTRISONE) cream apply topically to affected area 2 times daily as needed;  area 45 g 2    fluocinolone (SYNALAR) 0.025 % cream Apply topically 2 (two) times daily. 60 g 0    lisinopril 10 MG tablet TAKE 1 TABLET BY MOUTH DAILY 90 tablet 1    LORazepam (ATIVAN) 0.5 MG tablet TAKE ONE-HALF TABLET BY MOUTH AT NIGHT 30 tablet 0    metoprolol succinate (TOPROL-XL) 100 MG 24 hr tablet TAKE 1 TABLET BY MOUTH ONCE A DAY 90 tablet 0    MULTIVITAMIN ORAL Take 1 tablet by mouth once daily.       nitroGLYCERIN (NITROSTAT) 0.4 MG SL tablet Place 1 tablet under the tongue as needed.        No current facility-administered medications for this visit.        Review of Systems   Constitutional: Negative for chills and fever.   Respiratory: Negative for shortness of breath.    Cardiovascular: Negative for chest pain.       Objective:   Physical Exam  Vitals:    01/16/20 0921   BP: (!) 102/58   BP Location: Left arm   Patient Position: Sitting   BP Method: Medium (Manual)   Pulse: 61   Temp: 97.8 °F (36.6 °C)   TempSrc: Oral   SpO2: 95%   Weight: 101.6 kg (224 lb)   Height: 6' (1.829 m)    Body mass index is 30.38 kg/m².  Weight: 101.6 kg (224 lb)   Height: 6' (182.9 cm)     Physical Exam   Constitutional: He is oriented to person, place, and time. He appears well-developed and well-nourished. No distress.   Eyes: EOM are normal.   Cardiovascular: Normal rate, regular rhythm and normal heart sounds.   No murmur heard.  Pulmonary/Chest: Effort normal and breath  sounds normal.   Musculoskeletal: Normal range of motion. He exhibits edema.   Trace pedal edema ankles bilateral; tender   Neurological: He is alert and oriented to person, place, and time. Coordination normal.   Skin: Skin is warm and dry.   Psychiatric: He has a normal mood and affect. His behavior is normal. Thought content normal.

## 2020-01-16 ENCOUNTER — OFFICE VISIT (OUTPATIENT)
Dept: FAMILY MEDICINE | Facility: CLINIC | Age: 85
End: 2020-01-16
Payer: MEDICARE

## 2020-01-16 VITALS
DIASTOLIC BLOOD PRESSURE: 58 MMHG | SYSTOLIC BLOOD PRESSURE: 102 MMHG | HEART RATE: 61 BPM | BODY MASS INDEX: 30.34 KG/M2 | OXYGEN SATURATION: 95 % | HEIGHT: 72 IN | TEMPERATURE: 98 F | WEIGHT: 224 LBS

## 2020-01-16 DIAGNOSIS — I77.9 RIGHT-SIDED CAROTID ARTERY DISEASE, UNSPECIFIED TYPE: ICD-10-CM

## 2020-01-16 DIAGNOSIS — I20.9 ANGINA PECTORIS SYNDROME: ICD-10-CM

## 2020-01-16 DIAGNOSIS — F13.20 BENZODIAZEPINE DEPENDENCE: ICD-10-CM

## 2020-01-16 DIAGNOSIS — F43.10 PTSD (POST-TRAUMATIC STRESS DISORDER): ICD-10-CM

## 2020-01-16 DIAGNOSIS — G47.00 INSOMNIA, UNSPECIFIED TYPE: Primary | ICD-10-CM

## 2020-01-16 DIAGNOSIS — I25.10 CORONARY ARTERY DISEASE INVOLVING NATIVE CORONARY ARTERY WITHOUT ANGINA PECTORIS, UNSPECIFIED WHETHER NATIVE OR TRANSPLANTED HEART: ICD-10-CM

## 2020-01-16 DIAGNOSIS — I10 ESSENTIAL HYPERTENSION: ICD-10-CM

## 2020-01-16 DIAGNOSIS — E78.2 MIXED HYPERLIPIDEMIA: ICD-10-CM

## 2020-01-16 PROCEDURE — 99999 PR PBB SHADOW E&M-EST. PATIENT-LVL III: ICD-10-PCS | Mod: PBBFAC,,, | Performed by: INTERNAL MEDICINE

## 2020-01-16 PROCEDURE — 99999 PR PBB SHADOW E&M-EST. PATIENT-LVL III: CPT | Mod: PBBFAC,,, | Performed by: INTERNAL MEDICINE

## 2020-01-16 PROCEDURE — 99214 PR OFFICE/OUTPT VISIT, EST, LEVL IV, 30-39 MIN: ICD-10-PCS | Mod: S$PBB,,, | Performed by: INTERNAL MEDICINE

## 2020-01-16 PROCEDURE — 1159F MED LIST DOCD IN RCRD: CPT | Mod: ,,, | Performed by: INTERNAL MEDICINE

## 2020-01-16 PROCEDURE — 1126F AMNT PAIN NOTED NONE PRSNT: CPT | Mod: ,,, | Performed by: INTERNAL MEDICINE

## 2020-01-16 PROCEDURE — 99214 OFFICE O/P EST MOD 30 MIN: CPT | Mod: S$PBB,,, | Performed by: INTERNAL MEDICINE

## 2020-01-16 PROCEDURE — 99213 OFFICE O/P EST LOW 20 MIN: CPT | Mod: PBBFAC,PO | Performed by: INTERNAL MEDICINE

## 2020-01-16 PROCEDURE — 1126F PR PAIN SEVERITY QUANTIFIED, NO PAIN PRESENT: ICD-10-PCS | Mod: ,,, | Performed by: INTERNAL MEDICINE

## 2020-01-16 PROCEDURE — 1159F PR MEDICATION LIST DOCUMENTED IN MEDICAL RECORD: ICD-10-PCS | Mod: ,,, | Performed by: INTERNAL MEDICINE

## 2020-01-16 RX ORDER — LISINOPRIL 10 MG/1
10 TABLET ORAL DAILY
Qty: 90 TABLET | Refills: 3 | Status: SHIPPED | OUTPATIENT
Start: 2020-01-16 | End: 2021-03-09

## 2020-01-16 RX ORDER — ATORVASTATIN CALCIUM 40 MG/1
40 TABLET, FILM COATED ORAL DAILY
Qty: 90 TABLET | Refills: 3 | Status: SHIPPED | OUTPATIENT
Start: 2020-01-16 | End: 2021-03-09

## 2020-01-16 RX ORDER — METOPROLOL SUCCINATE 100 MG/1
100 TABLET, EXTENDED RELEASE ORAL DAILY
Qty: 90 TABLET | Refills: 3 | Status: SHIPPED | OUTPATIENT
Start: 2020-01-16 | End: 2020-12-08

## 2020-01-16 RX ORDER — LORAZEPAM 0.5 MG/1
0.5 TABLET ORAL NIGHTLY
Qty: 90 TABLET | Refills: 0 | Status: SHIPPED | OUTPATIENT
Start: 2020-01-16 | End: 2020-07-27

## 2020-03-05 ENCOUNTER — OFFICE VISIT (OUTPATIENT)
Dept: DERMATOLOGY | Facility: CLINIC | Age: 85
End: 2020-03-05
Payer: MEDICARE

## 2020-03-05 DIAGNOSIS — L57.0 AK (ACTINIC KERATOSIS): ICD-10-CM

## 2020-03-05 DIAGNOSIS — Z85.828 HISTORY OF NONMELANOMA SKIN CANCER: ICD-10-CM

## 2020-03-05 DIAGNOSIS — D48.5 NEOPLASM OF UNCERTAIN BEHAVIOR OF SKIN: Primary | ICD-10-CM

## 2020-03-05 DIAGNOSIS — L82.1 SK (SEBORRHEIC KERATOSIS): ICD-10-CM

## 2020-03-05 PROCEDURE — 17003 DESTRUCTION, PREMALIGNANT LESIONS; SECOND THROUGH 14 LESIONS: ICD-10-PCS | Mod: 59,S$PBB,, | Performed by: DERMATOLOGY

## 2020-03-05 PROCEDURE — 11102 TANGNTL BX SKIN SINGLE LES: CPT | Mod: S$PBB,,, | Performed by: DERMATOLOGY

## 2020-03-05 PROCEDURE — 17000 PR DESTRUCTION(LASER SURGERY,CRYOSURGERY,CHEMOSURGERY),PREMALIGNANT LESIONS,FIRST LESION: ICD-10-PCS | Mod: 59,S$PBB,, | Performed by: DERMATOLOGY

## 2020-03-05 PROCEDURE — 99213 PR OFFICE/OUTPT VISIT, EST, LEVL III, 20-29 MIN: ICD-10-PCS | Mod: 25,S$PBB,, | Performed by: DERMATOLOGY

## 2020-03-05 PROCEDURE — 17000 DESTRUCT PREMALG LESION: CPT | Mod: 59,PBBFAC | Performed by: DERMATOLOGY

## 2020-03-05 PROCEDURE — 17003 DESTRUCT PREMALG LES 2-14: CPT | Mod: 59,PBBFAC | Performed by: DERMATOLOGY

## 2020-03-05 PROCEDURE — 88305 TISSUE EXAM BY PATHOLOGIST: ICD-10-PCS | Mod: 26,,, | Performed by: PATHOLOGY

## 2020-03-05 PROCEDURE — 11102 TANGNTL BX SKIN SINGLE LES: CPT | Mod: PBBFAC | Performed by: DERMATOLOGY

## 2020-03-05 PROCEDURE — 17003 DESTRUCT PREMALG LES 2-14: CPT | Mod: 59,S$PBB,, | Performed by: DERMATOLOGY

## 2020-03-05 PROCEDURE — 99999 PR PBB SHADOW E&M-EST. PATIENT-LVL II: CPT | Mod: PBBFAC,,, | Performed by: DERMATOLOGY

## 2020-03-05 PROCEDURE — 88305 TISSUE EXAM BY PATHOLOGIST: CPT | Mod: 26,,, | Performed by: PATHOLOGY

## 2020-03-05 PROCEDURE — 17000 DESTRUCT PREMALG LESION: CPT | Mod: 59,S$PBB,, | Performed by: DERMATOLOGY

## 2020-03-05 PROCEDURE — 99213 OFFICE O/P EST LOW 20 MIN: CPT | Mod: 25,S$PBB,, | Performed by: DERMATOLOGY

## 2020-03-05 PROCEDURE — 99212 OFFICE O/P EST SF 10 MIN: CPT | Mod: PBBFAC | Performed by: DERMATOLOGY

## 2020-03-05 PROCEDURE — 88305 TISSUE EXAM BY PATHOLOGIST: CPT | Performed by: PATHOLOGY

## 2020-03-05 PROCEDURE — 99999 PR PBB SHADOW E&M-EST. PATIENT-LVL II: ICD-10-PCS | Mod: PBBFAC,,, | Performed by: DERMATOLOGY

## 2020-03-05 PROCEDURE — 11102 PR TANGENTIAL BIOPSY, SKIN, SINGLE LESION: ICD-10-PCS | Mod: S$PBB,,, | Performed by: DERMATOLOGY

## 2020-03-05 NOTE — PROGRESS NOTES
Subjective:       Patient ID:  Aleks Brown is a 87 y.o. male who presents for   Chief Complaint   Patient presents with    Skin Check     UBSE      History of Present Illness: The patient presents for follow up of skin check.    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    Other skin complaints:   Patient complains of lesion(s)  Location: nose  Duration: 3 weeks  Symptoms: bled  Relieving factors/Previous treatments: none          Review of Systems   Skin: Positive for wears hat (always). Negative for daily sunscreen use, activity-related sunscreen use and recent sunburn.   Hematologic/Lymphatic: Bruises/bleeds easily (on asa).        Objective:    Physical Exam   Constitutional: He appears well-developed and well-nourished. He is obese.  No distress.   Neurological: He is alert and oriented to person, place, and time. He is not disoriented.   Psychiatric: He has a normal mood and affect.   Skin:   Areas Examined (abnormalities noted in diagram):   Scalp / Hair Palpated and Inspected  Head / Face Inspection Performed  Neck Inspection Performed  Chest / Axilla Inspection Performed  Back Inspection Performed  RUE Inspected  LUE Inspection Performed                       Diagram Legend     Erythematous scaling macule/papule c/w actinic keratosis       Vascular papule c/w angioma      Pigmented verrucoid papule/plaque c/w seborrheic keratosis      Yellow umbilicated papule c/w sebaceous hyperplasia      Irregularly shaped tan macule c/w lentigo     1-2 mm smooth white papules consistent with Milia      Movable subcutaneous cyst with punctum c/w epidermal inclusion cyst      Subcutaneous movable cyst c/w pilar cyst      Firm pink to brown papule c/w dermatofibroma       Pedunculated fleshy papule(s) c/w skin tag(s)      Evenly pigmented macule c/w junctional nevus     Mildly variegated pigmented, slightly irregular-bordered macule c/w mildly atypical nevus      Flesh colored to evenly pigmented papule c/w intradermal nevus       Pink pearly papule/plaque c/w basal cell carcinoma      Erythematous hyperkeratotic cursted plaque c/w SCC      Surgical scar with no sign of skin cancer recurrence      Open and closed comedones      Inflammatory papules and pustules      Verrucoid papule consistent consistent with wart     Erythematous eczematous patches and plaques     Dystrophic onycholytic nail with subungual debris c/w onychomycosis     Umbilicated papule    Erythematous-base heme-crusted tan verrucoid plaque consistent with inflamed seborrheic keratosis     Erythematous Silvery Scaling Plaque c/w Psoriasis     See annotation          Assessment / Plan:      Pathology Orders:     Normal Orders This Visit    Specimen to Pathology, Dermatology     Questions:    Procedure Type:  Dermatology and skin neoplasms    Number of Specimens:  1    ------------------------:  -------------------------    Spec 1 Procedure:  Biopsy    Spec 1 Clinical Impression:  r/o bcc    Spec 1 Source:  left nasal tip        Neoplasm of uncertain behavior of skin  -     Specimen to Pathology, Dermatology    Shave biopsy procedure note:    Shave biopsy performed after verbal consent including risk of infection, scar, recurrence, need for additional treatment of site. Area prepped with alcohol, anesthetized with approximately 1.0cc of 1% lidocaine with epinephrine. Lesional tissue shaved with razor blade. Hemostasis achieved with application of aluminum chloride followed by hyfrecation. No complications. Dressing applied. Wound care explained.    If biopsy positive for malignancy, refer to Dr. Rashid for Mohs surgery consultation.      AK (actinic keratosis)  Cryosurgery Procedure Note    Verbal consent from the  patient is obtained including, but not limited to, risk of hypopigmentation/hyperpigmentation, scar, recurrence of lesion. The patient is aware of the precancerous quality and need for treatment of these lesions. Liquid nitrogen cryosurgery is applied to the 6 actinic keratoses, as detailed in the physical exam, to produce a freeze injury. The patient is aware that blisters may form and is instructed on wound care with gentle cleansing and use of vaseline ointment to keep moist until healed. The patient is supplied a handout on cryosurgery and is instructed to call if lesions do not completely resolve.    And    [facility] for efudex/dovonex bid to AA left cheek and left temple and right forehead, and right forehead x 5 - 7 days      SK (seborrheic keratosis)  These are benign inherited growths without a malignant potential. Reassurance given to patient. No treatment is necessary.       History of nonmelanoma skin cancer  Area(s) of previous NMSC evaluated with no signs of recurrence.    Upper body skin examination performed today including at least 6 points as noted in physical examination. Suspicious lesions noted.               Follow up in about 3 months (around 6/5/2020).

## 2020-03-05 NOTE — PATIENT INSTRUCTIONS
" Shave Biopsy Wound Care    Your doctor has performed a shave biopsy today.  A band aid and vaseline ointment has been placed over the site.  This should remain in place for 24 hours.  It is recommended that you keep the area dry for the first 24 hours.  After 24 hours, you may remove the band aid and wash the area with warm soap and water and apply Vaseline jelly.  Many patients prefer to use Neosporin or Bacitracin ointment.  This is acceptable; however, know that you can develop an allergy to this medication even if you have used it safely for years.  It is important to keep the area moist.  Letting it dry out and get air slows healing time, and will worsen the scar.  Band aid is optional after first 24 hours.      If you notice increasing redness, tenderness, pain, or yellow drainage at the biopsy site, please notify your doctor.  These are signs of an infection.    If your biopsy site is bleeding, apply firm pressure for 15 minutes straight.  Repeat for another 15 minutes, if it is still bleeding.   If the surgical site continues to bleed, then please contact your doctor.      For [facility] users:   You will receive a [facility] notification after the pathologist has finished reviewing your biopsy specimen. Pathology results, however, will not be released online so you will see a "no content" message. Once your dermatologist reviews and clinically correlates your biopsy results, you will either receive a letter in the mail with the results of a phone call from your doctor's office if further explanation or treatment is warranted.       [address] [place] [address]/ [phone] FAX/ [email or web address]      CRYOSURGERY      Your doctor has used a method called cryosurgery to treat your skin condition. Cryosurgery refers to the use of very cold substances to treat a variety of skin conditions such as warts, pre-skin cancers, molluscum contagiosum, sun spots, and several benign " growths. The substance we use in cryosurgery is liquid nitrogen and is so cold (-195 degrees Celsius) that is burns when administered.     Following treatment in the office, the skin may immediately burn and become red. You may find the area around the lesion is affected as well. It is sometimes necessary to treat not only the lesion, but a small area of the surrounding normal skin to achieve a good response.     A blister, and even a blood filled blister, may form after treatment.   This is a normal response. If the blister is painful, it is acceptable to sterilize a needle and with rubbing alcohol and gently pop the blister. It is important that you gently wash the area with soap and warm water as the blister fluid may contain wart virus if a wart was treated. Do no remove the roof of the blister.     The area treated can take anywhere from 1-3 weeks to heal. Healing time depends on the kind skin lesion treated, the location, and how aggressively the lesion was treated. It is recommended that the areas treated are covered with Vaseline or bacitracin ointment and a band-aid. If a band-aid is not practical, just ointment applied several times per day will do. Keeping these areas moist will speed the healing time.    Treatment with liquid nitrogen can leave a scar. In dark skin, it may be a light or dark scar, in light skin it may be a white or pink scar. These will generally fade with time, but may never go away completely.     If you have any concerns after your treatment, please feel free to call the office.       [facility] [place] [address]/ [phone] FAX/ [email or web address]    Field Treatment for Actinic Keratoses (precancerous lesions)    5-Fluoruracil + Dovonex (calcipotriene) - This is a topical chemotherapy for your skin. This cream should never be applied without discussing with you dermatologist.    This treatment gets your immune system involved in fighting precancers  (actinic keratoses), even those we can't yet see.     HOW TO APPLY: Apply the combination cream to the affected area 2x/day x 5 -7 days. Apply a fingertip length amount to the entire area. Wash your hands carefully after applying the creams and wipe glasses, BIPAP/CPAP machines, or anything else that comes in frequent contact with the creams.    WHAT TO EXPECT: Your skin will likely become red, crusted, sore, and tender during the treatment usually starting around day 3 and continuing for about 1 week after last application.     HOW TO HEAL FAST: To speed healing, wash with a gentle wash and apply Vaseline jelly especially to crusted open areas.    WE CAN HELP: Please contact us for any questions or problem shooting the application of these creams: [phone] or [email or web address]    GREAT NEWS: Newer studies suggest that the combination of these creams not only decrease the sun damage spots and precancers (actinic keratoses) but also decrease your risk of getting skin cancer the year following application.     IT WILL BE WORTH IT!!!

## 2020-03-09 LAB
FINAL PATHOLOGIC DIAGNOSIS: NORMAL
GROSS: NORMAL

## 2020-05-18 ENCOUNTER — TELEPHONE (OUTPATIENT)
Dept: DERMATOLOGY | Facility: CLINIC | Age: 85
End: 2020-05-18

## 2020-05-18 ENCOUNTER — PROCEDURE VISIT (OUTPATIENT)
Dept: DERMATOLOGY | Facility: CLINIC | Age: 85
End: 2020-05-18
Payer: MEDICARE

## 2020-05-18 VITALS
BODY MASS INDEX: 30.34 KG/M2 | WEIGHT: 224 LBS | HEART RATE: 44 BPM | HEIGHT: 72 IN | DIASTOLIC BLOOD PRESSURE: 74 MMHG | SYSTOLIC BLOOD PRESSURE: 161 MMHG

## 2020-05-18 DIAGNOSIS — C44.311 BASAL CELL CARCINOMA OF LEFT NASAL TIP: Primary | ICD-10-CM

## 2020-05-18 PROCEDURE — 17311 MOHS 1 STAGE H/N/HF/G: CPT | Mod: S$PBB,51,, | Performed by: DERMATOLOGY

## 2020-05-18 PROCEDURE — 14060 TIS TRNFR E/N/E/L 10 SQ CM/<: CPT | Mod: S$PBB,,, | Performed by: DERMATOLOGY

## 2020-05-18 PROCEDURE — 14060 PR ADJ TISS XFER LID,NOS,EAR <10 SQCM: ICD-10-PCS | Mod: S$PBB,,, | Performed by: DERMATOLOGY

## 2020-05-18 PROCEDURE — 14060 TIS TRNFR E/N/E/L 10 SQ CM/<: CPT | Mod: PBBFAC | Performed by: DERMATOLOGY

## 2020-05-18 PROCEDURE — 17312: ICD-10-PCS | Mod: S$PBB,,, | Performed by: DERMATOLOGY

## 2020-05-18 PROCEDURE — 17311: ICD-10-PCS | Mod: S$PBB,51,, | Performed by: DERMATOLOGY

## 2020-05-18 PROCEDURE — 17311 MOHS 1 STAGE H/N/HF/G: CPT | Mod: PBBFAC | Performed by: DERMATOLOGY

## 2020-05-18 PROCEDURE — 99499 UNLISTED E&M SERVICE: CPT | Mod: S$PBB,,, | Performed by: DERMATOLOGY

## 2020-05-18 PROCEDURE — 17312 MOHS ADDL STAGE: CPT | Mod: S$PBB,,, | Performed by: DERMATOLOGY

## 2020-05-18 PROCEDURE — 99499 NO LOS: ICD-10-PCS | Mod: S$PBB,,, | Performed by: DERMATOLOGY

## 2020-05-18 PROCEDURE — 17312 MOHS ADDL STAGE: CPT | Mod: PBBFAC | Performed by: DERMATOLOGY

## 2020-05-18 RX ORDER — CEPHALEXIN 500 MG/1
500 CAPSULE ORAL 3 TIMES DAILY
Qty: 21 CAPSULE | Refills: 0 | Status: SHIPPED | OUTPATIENT
Start: 2020-05-18 | End: 2020-05-25 | Stop reason: HOSPADM

## 2020-05-18 RX ORDER — HYDROCODONE BITARTRATE AND ACETAMINOPHEN 5; 325 MG/1; MG/1
1 TABLET ORAL EVERY 6 HOURS PRN
Qty: 10 TABLET | Refills: 0 | Status: SHIPPED | OUTPATIENT
Start: 2020-05-18 | End: 2020-09-04 | Stop reason: ALTCHOICE

## 2020-05-18 NOTE — TELEPHONE ENCOUNTER
Spoke to Randee from [employer]. Patient had Mohs surgery today for a BCC L nasal tip repaired with a Burow's advancement flap. Patient was encouraged to use OTC pain relievers, ie Tylenol, and an ice pack to alleviate any pain or discomfort to the surgical site. Patient stated he may have prescribed pain medication already at home but was unsure. Patient requested Sylvia to be sent to pharmacy but stated he may not pick it up. Aware that patient is on lorazepam.      ----- Message from Sol Maynard sent at 5/18/2020 11:54 AM CDT -----  Contact: [facility]  Reason: Calling to inform of a drug interaction need a call back    HYDROcodone-acetaminophen (NORCO) 5-325 mg per tablet and  LORazepam (ATIVAN) 0.5 MG tablet          Contact: [phone]   Fax: [phone]

## 2020-05-18 NOTE — PROGRESS NOTES
PROCEDURE: Mohs' Micrographic Surgery    INDICATION: Location in mask areas of face including central face, nose, eyelids, eyebrows, lips, chin, preauricular, temple, and ear. Biopsy-proven skin cancer of cosmetically and functionally important areas, including head, neck, genital, hand, foot, or areas known for having difficulty in healing, such as the lower anterior legs. Tumor with ill-defined borders.    REFERRING MD: Gillian Williamson M.D.    CASE NUMBER:     ANESTHETIC: 3 cc 0.5% Lidocaine with Epi 1:200,000 mixed 1:1 with 0.5% Bupivacaine    SURGICAL PREP: Hibiclens    SURGEON: Magnus Rashid MD    ASSISTANTS: Valeria Young PA-C, Kacie Dickson, Surg Tech and Selena Mae, Surg Tech    PREOPERATIVE DIAGNOSIS: basal cell carcinoma    POSTOPERATIVE DIAGNOSIS: basal cell carcinoma    PATHOLOGIC DIAGNOSIS: basal cell carcinoma- superficial, nodular, infiltrating    HISTOLOGY OF SPECIMENS IN FIRST STAGE:   Tumor Type: Tumor seen. Nodular basal cell carcinoma: Nodular tumor in dermis composed of basaloid cells exhibiting peripheral palisading and retraction artifact.  Infiltrative basal cell carcinoma: Basaloid tumor in dermis characterized by thin elongated strands of basaloid cells infiltrating between dermal collagen bundles.   Depth of Invasion: epidermis, dermis and subcutaneous tissue  Perineural Invasion: No    HISTOLOGY OF SPECIMENS IN SUBSEQUENT STAGES:  · Tumor Type: No tumor seen.    STAGES OF MOHS' SURGERY PERFORMED: 2    TUMOR-FREE PLANE ACHIEVED: Yes    HEMOSTASIS: electrocoagulation     SPECIMENS: 5 (2 in stage A and 3 in stage B)    LOCATION: left nasal tip. Patient verified location with hand held mirror.    INITIAL LESION SIZE: 0.8 x 0.8 cm    FINAL DEFECT SIZE: 0.9 x 1.3 cm    WOUND REPAIR/DISPOSITION: The patient tolerated Mohs' Micrographic Surgery for a basal cell carcinoma very well. When the tumor was completely removed, a repair of the surgical defect was undertaken.      PROCEDURE:  Burow's Advancement Flap (Adjacent Tissue Transfer)    INDICATION: Status post Mohs' Micrographic Surgery for basal cell carcinoma.    CASE NUMBER:     SURGEON: Magnus Rashid MD    ASSISTANTS: Valeria Young PA-C and Olman Kenny     ANESTHETIC: 3 cc 1% Lidocaine with Epinephrine 1:100,000    SURGICAL PREP: Hibiclens, prepped by Selena Mae Surg aPtti    LOCATION: left nasal tip    DEFECT SIZE: 0.9 x 1.3 cm    WOUND REPAIR/DISPOSITION:  After the patient's carcinoma had been completely removed with Mohs' Micrographic Surgery, a repair of the surgical defect was undertaken. The patient was returned to the operating suite where the area of left nasal tip was prepped, draped, and anesthetized in the usual sterile fashion. A Burow's advancement flap was designed in the inferior surrounding tissue of the left nasal tip. A Burow's triangle was drawn in superiorly to help with tissue movement. Then with a #15 blade the flap was incised and the Burow's triangle was excised, the area was widely undermined in the submuscular tissue plane. Then, electrocoagulation was used to obtain meticulous hemostasis. A 5-0 Vicryl pexing suture was performed by attaching the underside of the most medial aspect of the flap to the left nasal tip. The flap was then advanced in by a complex closure. Then, 5-0 Vicryl buried vertical mattress sutures were placed into the subcutaneous and dermal plane to close the wound and maria guadalupe the cutaneous wound edge. The flap was then trimmed to fit the defect. The cutaneous wound edges were closed using interrupted 5-0 Prolene sutures.    The patient tolerated the procedure well.    The area was cleaned and dressed appropriately and the patient was given wound care instructions, as well as an appointment for follow-up evaluation. Patient was placed on Norco 5-325 mg prn postop pain and Keflex 500 mg TID x 7 days.    SIZE OF FLAP: 6 cm squared    Vitals:    05/18/20 0733 05/18/20 1157   BP:  (!) 143/68 (!) 161/74   BP Location: Right arm Right arm   Patient Position: Sitting Sitting   BP Method: Large (Automatic) Large (Automatic)   Pulse: (!) 55 (!) 44   Weight: 101.6 kg (224 lb) 101.6 kg (224 lb)   Height: 6' (1.829 m) 6' (1.829 m)

## 2020-05-25 ENCOUNTER — OFFICE VISIT (OUTPATIENT)
Dept: DERMATOLOGY | Facility: CLINIC | Age: 85
End: 2020-05-25
Payer: MEDICARE

## 2020-05-25 DIAGNOSIS — Z09 POSTOP CHECK: Primary | ICD-10-CM

## 2020-05-25 PROCEDURE — 99024 POSTOP FOLLOW-UP VISIT: CPT | Mod: POP,,, | Performed by: DERMATOLOGY

## 2020-05-25 PROCEDURE — 99999 PR PBB SHADOW E&M-EST. PATIENT-LVL II: ICD-10-PCS | Mod: PBBFAC,,, | Performed by: DERMATOLOGY

## 2020-05-25 PROCEDURE — 99024 PR POST-OP FOLLOW-UP VISIT: ICD-10-PCS | Mod: POP,,, | Performed by: DERMATOLOGY

## 2020-05-25 PROCEDURE — 99999 PR PBB SHADOW E&M-EST. PATIENT-LVL II: CPT | Mod: PBBFAC,,, | Performed by: DERMATOLOGY

## 2020-05-25 PROCEDURE — 99212 OFFICE O/P EST SF 10 MIN: CPT | Mod: PBBFAC | Performed by: DERMATOLOGY

## 2020-05-25 NOTE — PROGRESS NOTES
87 y.o. male patient is here for suture removal following Mohs' surgery.    Patient reports no problems.    WOUND PE:  The left nasal tip sutures intact. Wound healing well. Good skin edges. No signs or symptoms of infection. Falp is pink and viable.    IMPRESSION:  Healing operative site from Mohs' surgery. BCC left nasal tip s/p Mohs' with Burow's Advancement flap, postop day #7.    PLAN:  Sutures removed today. No steri strips as patient had issue with tape  D/Continue wound care.  Keep moist with Aquaphor.  Call if any concern arises    RTC:  In 3-6 months with Gillian Williamson M.D. for skin check or sooner if new concern arises.

## 2020-06-15 ENCOUNTER — OFFICE VISIT (OUTPATIENT)
Dept: CARDIOLOGY | Facility: CLINIC | Age: 85
End: 2020-06-15
Payer: MEDICARE

## 2020-06-15 VITALS
WEIGHT: 226.44 LBS | BODY MASS INDEX: 30.67 KG/M2 | HEART RATE: 67 BPM | SYSTOLIC BLOOD PRESSURE: 110 MMHG | HEIGHT: 72 IN | DIASTOLIC BLOOD PRESSURE: 52 MMHG | OXYGEN SATURATION: 96 %

## 2020-06-15 DIAGNOSIS — Z95.1 HX OF CABG: ICD-10-CM

## 2020-06-15 DIAGNOSIS — I10 ESSENTIAL HYPERTENSION: Primary | ICD-10-CM

## 2020-06-15 DIAGNOSIS — E78.00 PURE HYPERCHOLESTEROLEMIA: ICD-10-CM

## 2020-06-15 DIAGNOSIS — I65.23 BILATERAL CAROTID ARTERY STENOSIS: ICD-10-CM

## 2020-06-15 DIAGNOSIS — I25.10 CORONARY ARTERY DISEASE INVOLVING NATIVE CORONARY ARTERY OF NATIVE HEART WITHOUT ANGINA PECTORIS: ICD-10-CM

## 2020-06-15 DIAGNOSIS — M79.89 LEG SWELLING: ICD-10-CM

## 2020-06-15 DIAGNOSIS — I20.9 ANGINA PECTORIS SYNDROME: ICD-10-CM

## 2020-06-15 DIAGNOSIS — R06.09 DOE (DYSPNEA ON EXERTION): ICD-10-CM

## 2020-06-15 PROCEDURE — 99214 OFFICE O/P EST MOD 30 MIN: CPT | Mod: PBBFAC | Performed by: INTERNAL MEDICINE

## 2020-06-15 PROCEDURE — 99214 OFFICE O/P EST MOD 30 MIN: CPT | Mod: S$PBB,,, | Performed by: INTERNAL MEDICINE

## 2020-06-15 PROCEDURE — 99999 PR PBB SHADOW E&M-EST. PATIENT-LVL IV: CPT | Mod: PBBFAC,,, | Performed by: INTERNAL MEDICINE

## 2020-06-15 PROCEDURE — 99214 PR OFFICE/OUTPT VISIT, EST, LEVL IV, 30-39 MIN: ICD-10-PCS | Mod: S$PBB,,, | Performed by: INTERNAL MEDICINE

## 2020-06-15 PROCEDURE — 99999 PR PBB SHADOW E&M-EST. PATIENT-LVL IV: ICD-10-PCS | Mod: PBBFAC,,, | Performed by: INTERNAL MEDICINE

## 2020-06-15 RX ORDER — FUROSEMIDE 20 MG/1
20 TABLET ORAL DAILY
Qty: 90 TABLET | Refills: 3 | Status: SHIPPED | OUTPATIENT
Start: 2020-06-15 | End: 2020-12-08

## 2020-06-15 NOTE — PROGRESS NOTES
Subjective:   Patient ID:  Aleks Brown is a 87 y.o. male is a new patient who presents for evaluation of Coronary Artery Disease  c/o leg swelling    ACTIVE PROBLEM LIST:    - S/P unstable angina 02/12/2011    - S/P CABG x 5 1988;    - hypertension    - dyslipidemia    - Carotid stenosis    - COPD    - obese      Cath 2-11 showed 4/5 grafts patent - this was for the sx of shoulder discomfort and sob.     CONCLUSIONS 11-12 - this was basically unchanged in 2015  There is 60 - 69% right Internal Carotid stenosis.  There is 40 - 49% left Internal Carotid stenosis.  Bilateral ECA stenosis.   HPI:   increasing leg swelling,   Snores at night but feels fresh in the morning  No chest pain, Orthopnea, PND of heart failure symptoms.   Walks the dog.  Denies palpitations or fluttering in the chest      Patient Active Problem List   Diagnosis    Bilateral carotid artery stenosis    Pure hypercholesterolemia    Angina pectoris syndrome    Postsurgical aortocoronary bypass status    Insomnia hx trazodone ineffective and with SE    Essential hypertension    Coronary artery disease involving native coronary artery without angina pectoris s/p CABG;  2011 [facility] 4/5 grafts patent    PTSD (post-traumatic stress disorder); short temper; avoids crowds. 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 [facility] - [facility], orthotics    Benzodiazepine dependence    ARGUETA (dyspnea on exertion)    Leg swelling     BP (!) 110/52 (BP Location: Left arm, Patient Position: Sitting, BP Method: Large (Manual))   Pulse 67   Ht 6' (1.829 m)   Wt 102.7 kg (226 lb 6.6 oz)   SpO2 96%   BMI 30.71 kg/m²   Body mass index is 30.71 kg/m².  CrCl cannot be calculated (Patient's most recent lab result is older than the maximum 7 days allowed.).    Lab Results   Component Value Date     09/17/2019    K 4.9 09/17/2019     09/17/2019    CO2 27  09/17/2019    BUN 25 (H) 09/17/2019    CREATININE 1.0 09/17/2019     09/17/2019    HGBA1C 5.8 12/01/2010    MG 2.1 02/15/2011    AST 24 09/17/2019    ALT 20 09/17/2019    ALBUMIN 3.7 09/17/2019    PROT 6.8 09/17/2019    BILITOT 0.6 09/17/2019    WBC 7.87 09/17/2019    HGB 13.1 (L) 09/17/2019    HCT 42.0 09/17/2019     (H) 09/17/2019     09/17/2019    INR 1.0 02/14/2011    PSA 3.2 05/08/2007    TSH 0.355 (L) 11/07/2012    CHOL 113 (L) 09/17/2019    HDL 33 (L) 09/17/2019    LDLCALC 61.2 (L) 09/17/2019    TRIG 94 09/17/2019       Current Outpatient Medications   Medication Sig    aspirin (ASPIRIN LOW DOSE) 81 MG EC tablet Take 81 mg by mouth once daily.     atorvastatin (LIPITOR) 40 MG tablet Take 1 tablet (40 mg total) by mouth once daily.    clotrimazole-betamethasone 1-0.05% (LOTRISONE) cream apply topically to affected area 2 times daily as needed;  area    fluocinolone (SYNALAR) 0.025 % cream Apply topically 2 (two) times daily.    HYDROcodone-acetaminophen (NORCO) 5-325 mg per tablet Take 1 tablet by mouth every 6 (six) hours as needed for Pain.    lisinopril 10 MG tablet Take 1 tablet (10 mg total) by mouth once daily.    LORazepam (ATIVAN) 0.5 MG tablet Take 1 tablet (0.5 mg total) by mouth every evening. TAKE ONE-HALF TABLET BY MOUTH AT NIGHT    metoprolol succinate (TOPROL-XL) 100 MG 24 hr tablet Take 1 tablet (100 mg total) by mouth once daily.    MULTIVITAMIN ORAL Take 1 tablet by mouth once daily.     nitroGLYCERIN (NITROSTAT) 0.4 MG SL tablet Place 1 tablet under the tongue as needed.     furosemide (LASIX) 20 MG tablet Take 1 tablet (20 mg total) by mouth once daily.     No current facility-administered medications for this visit.        Review of Systems   Constitution: Negative for chills, decreased appetite, malaise/fatigue, night sweats, weight gain and weight loss.   Eyes: Negative for blurred vision, double vision, visual disturbance and visual halos.    Cardiovascular: Positive for dyspnea on exertion. Negative for chest pain, claudication, cyanosis, irregular heartbeat, leg swelling, near-syncope, orthopnea, palpitations, paroxysmal nocturnal dyspnea and syncope.   Respiratory: Positive for shortness of breath. Negative for cough, hemoptysis, snoring, sputum production and wheezing.    Endocrine: Negative for cold intolerance, heat intolerance, polydipsia and polyphagia.   Hematologic/Lymphatic: Negative for adenopathy and bleeding problem. Does not bruise/bleed easily.   Skin: Negative for flushing, itching, poor wound healing and rash.   Musculoskeletal: Negative for arthritis, back pain, falls, gout, joint pain, joint swelling, muscle cramps, muscle weakness, myalgias, neck pain and stiffness.   Gastrointestinal: Negative for bloating, abdominal pain, anorexia, diarrhea, dysphagia, excessive appetite, flatus, hematemesis, jaundice, melena and nausea.   Genitourinary: Negative for hesitancy and incomplete emptying.   Neurological: Negative for aphonia, brief paralysis, difficulty with concentration, disturbances in coordination, excessive daytime sleepiness, dizziness, focal weakness, light-headedness, loss of balance and weakness.   Psychiatric/Behavioral: Negative for altered mental status, depression, hallucinations, hypervigilance, memory loss, substance abuse and suicidal ideas. The patient does not have insomnia and is not nervous/anxious.        Objective:   Physical Exam   Constitutional: He is oriented to person, place, and time. He appears well-developed and well-nourished. No distress.   HENT:   Head: Normocephalic and atraumatic.   Nose: Nose normal.   Mouth/Throat: No oropharyngeal exudate.   Eyes: Pupils are equal, round, and reactive to light. Conjunctivae and EOM are normal. Right eye exhibits no discharge. Left eye exhibits no discharge. No scleral icterus.   Neck: Normal range of motion. Neck supple. No JVD present. No tracheal deviation  present. No thyromegaly present.   Cardiovascular: Normal rate, regular rhythm and intact distal pulses. Exam reveals no gallop and no friction rub.   Murmur (soft) heard.  Pulmonary/Chest: Effort normal and breath sounds normal. No stridor. No respiratory distress. He has no wheezes. He has no rales. He exhibits no tenderness.   Abdominal: Soft. Bowel sounds are normal. He exhibits no distension and no mass. There is no abdominal tenderness.   Musculoskeletal:         General: Edema (minimal b/l) present. No tenderness.   Lymphadenopathy:     He has no cervical adenopathy.   Neurological: He is alert and oriented to person, place, and time. He displays normal reflexes. No cranial nerve deficit. He exhibits normal muscle tone. Coordination normal.   Skin: Skin is warm. No rash noted. He is not diaphoretic. No erythema. No pallor.   Psychiatric: He has a normal mood and affect. His behavior is normal. Judgment and thought content normal.       Assessment:     1. Essential hypertension    2. Angina pectoris syndrome    3. Bilateral carotid artery stenosis    4. Pure hypercholesterolemia    5. Leg swelling    6. ARGUETA (dyspnea on exertion)    7. Coronary artery disease involving native coronary artery of native heart without angina pectoris    8. Hx of CABG        Plan:   NATTY may be the culprit but patient does not want to to NATTY eval for now. Will do echo and Carotid US and treat leg swelling with lasix,. Discuss NATTY further om the next visit.   Counseled on importance of heart healthy diet low in saturated and trans fat and salt as well gradually starting a regular aerobic exercise regimen with goal of 30min 5x/week. Recommend BP diary. Call if systolic BP > 130 mmHg on checking repeatedly  Aleks was seen today for coronary artery disease.    Diagnoses and all orders for this visit:    Essential hypertension  -     Echo Color Flow Doppler? Yes; Future    Angina pectoris syndrome    Bilateral carotid artery stenosis  -      CV Ultrasound Bilateral Doppler Carotid; Future    Pure hypercholesterolemia    Leg swelling  -     Echo Color Flow Doppler? Yes; Future    ARGUETA (dyspnea on exertion)  -     Echo Color Flow Doppler? Yes; Future    Coronary artery disease involving native coronary artery of native heart without angina pectoris  -     Lipid Panel; Future  -     Comprehensive metabolic panel; Future  -     CBC auto differential; Future    Hx of CABG    Other orders  -     furosemide (LASIX) 20 MG tablet; Take 1 tablet (20 mg total) by mouth once daily.      RTC 8 wks

## 2020-06-22 ENCOUNTER — OFFICE VISIT (OUTPATIENT)
Dept: DERMATOLOGY | Facility: CLINIC | Age: 85
End: 2020-06-22
Payer: MEDICARE

## 2020-06-22 DIAGNOSIS — L57.0 AK (ACTINIC KERATOSIS): Primary | ICD-10-CM

## 2020-06-22 PROCEDURE — 99999 PR PBB SHADOW E&M-EST. PATIENT-LVL III: CPT | Mod: PBBFAC,,, | Performed by: DERMATOLOGY

## 2020-06-22 PROCEDURE — 99999 PR PBB SHADOW E&M-EST. PATIENT-LVL III: ICD-10-PCS | Mod: PBBFAC,,, | Performed by: DERMATOLOGY

## 2020-06-22 PROCEDURE — 99499 UNLISTED E&M SERVICE: CPT | Mod: S$PBB,,, | Performed by: DERMATOLOGY

## 2020-06-22 PROCEDURE — 99499 NO LOS: ICD-10-PCS | Mod: S$PBB,,, | Performed by: DERMATOLOGY

## 2020-06-22 PROCEDURE — 99213 OFFICE O/P EST LOW 20 MIN: CPT | Mod: PBBFAC | Performed by: DERMATOLOGY

## 2020-06-22 NOTE — PROGRESS NOTES
Subjective:       Patient ID:  Aleks Brown is a 87 y.o. male who presents for   Chief Complaint   Patient presents with    Skin Check     efudex f/u      History of Present Illness: The patient presents for follow up of skin check.    The patient was last seen on: 3/5/20 for cryosurgery to actinic keratoses which have resolved. Did not use efudex/dovonex bid 2/2 other medical issues.   Had bx of bcc left nasal tip - excised by SSW    Other skin complaints: none        Review of Systems   Skin: Positive for wears hat (always). Negative for daily sunscreen use, activity-related sunscreen use and recent sunburn.   Hematologic/Lymphatic: Bruises/bleeds easily (on asa).        Objective:    Physical Exam   Constitutional: He appears well-developed and well-nourished. He is obese.  No distress.   Neurological: He is alert and oriented to person, place, and time. He is not disoriented.   Psychiatric: He has a normal mood and affect.   Skin:   Areas Examined (abnormalities noted in diagram):   Head / Face Inspection Performed                   Diagram Legend     Erythematous scaling macule/papule c/w actinic keratosis       Vascular papule c/w angioma      Pigmented verrucoid papule/plaque c/w seborrheic keratosis      Yellow umbilicated papule c/w sebaceous hyperplasia      Irregularly shaped tan macule c/w lentigo     1-2 mm smooth white papules consistent with Milia      Movable subcutaneous cyst with punctum c/w epidermal inclusion cyst      Subcutaneous movable cyst c/w pilar cyst      Firm pink to brown papule c/w dermatofibroma      Pedunculated fleshy papule(s) c/w skin tag(s)      Evenly pigmented macule c/w junctional nevus     Mildly variegated pigmented, slightly irregular-bordered macule c/w mildly atypical nevus      Flesh colored to evenly pigmented papule c/w intradermal nevus       Pink pearly papule/plaque c/w basal cell carcinoma      Erythematous hyperkeratotic cursted plaque c/w SCC       Surgical scar with no sign of skin cancer recurrence      Open and closed comedones      Inflammatory papules and pustules      Verrucoid papule consistent consistent with wart     Erythematous eczematous patches and plaques     Dystrophic onycholytic nail with subungual debris c/w onychomycosis     Umbilicated papule    Erythematous-base heme-crusted tan verrucoid plaque consistent with inflamed seborrheic keratosis     Erythematous Silvery Scaling Plaque c/w Psoriasis     See annotation      Assessment / Plan:        AK (actinic keratosis)  Treat efudex/dovonex bid x 5 days both cheeks and right forehead/temple             Follow up in about 3 months (around 9/22/2020).

## 2020-06-22 NOTE — PATIENT INSTRUCTIONS
Field Treatment for Actinic Keratoses (precancerous lesions)    5-Fluoruracil + Dovonex (calcipotriene) - This is a topical chemotherapy for your skin. This cream should never be applied without discussing with you dermatologist.    This treatment gets your immune system involved in fighting precancers (actinic keratoses), even those we can't yet see.     HOW TO APPLY: Apply the combination cream to the affected area both cheeks and right forehead and temple 2x/day x 5 days. Apply a fingertip length amount to the entire area. Wash your hands carefully after applying the creams and wipe glasses, BIPAP/CPAP machines, or anything else that comes in frequent contact with the creams.    WHAT TO EXPECT: Your skin will likely become red, crusted, sore, and tender during the treatment usually starting around day 3 and continuing for about 1 week after last application.     HOW TO HEAL FAST: To speed healing, wash with a gentle wash and apply Vaseline jelly especially to crusted open areas.    WE CAN HELP: Please contact us for any questions or problem shooting the application of these creams: [phone] or [email or web address]    GREAT NEWS: Newer studies suggest that the combination of these creams not only decrease the sun damage spots and precancers (actinic keratoses) but also decrease your risk of getting skin cancer the year following application.     IT WILL BE WORTH IT!!!

## 2020-06-29 ENCOUNTER — LAB VISIT (OUTPATIENT)
Dept: LAB | Facility: HOSPITAL | Age: 85
End: 2020-06-29
Attending: INTERNAL MEDICINE
Payer: MEDICARE

## 2020-06-29 DIAGNOSIS — I25.10 CORONARY ARTERY DISEASE INVOLVING NATIVE CORONARY ARTERY OF NATIVE HEART WITHOUT ANGINA PECTORIS: ICD-10-CM

## 2020-06-29 LAB
ALBUMIN SERPL BCP-MCNC: 3.7 G/DL (ref 3.5–5.2)
ALP SERPL-CCNC: 86 U/L (ref 55–135)
ALT SERPL W/O P-5'-P-CCNC: 19 U/L (ref 10–44)
ANION GAP SERPL CALC-SCNC: 6 MMOL/L (ref 8–16)
AST SERPL-CCNC: 25 U/L (ref 10–40)
BASOPHILS # BLD AUTO: 0.05 K/UL (ref 0–0.2)
BASOPHILS NFR BLD: 0.6 % (ref 0–1.9)
BILIRUB SERPL-MCNC: 0.8 MG/DL (ref 0.1–1)
BUN SERPL-MCNC: 29 MG/DL (ref 8–23)
CALCIUM SERPL-MCNC: 9.5 MG/DL (ref 8.7–10.5)
CHLORIDE SERPL-SCNC: 105 MMOL/L (ref 95–110)
CHOLEST SERPL-MCNC: 120 MG/DL (ref 120–199)
CHOLEST/HDLC SERPL: 2.9 {RATIO} (ref 2–5)
CO2 SERPL-SCNC: 29 MMOL/L (ref 23–29)
CREAT SERPL-MCNC: 1 MG/DL (ref 0.5–1.4)
DIFFERENTIAL METHOD: ABNORMAL
EOSINOPHIL # BLD AUTO: 0.2 K/UL (ref 0–0.5)
EOSINOPHIL NFR BLD: 2.4 % (ref 0–8)
ERYTHROCYTE [DISTWIDTH] IN BLOOD BY AUTOMATED COUNT: 12.4 % (ref 11.5–14.5)
EST. GFR  (AFRICAN AMERICAN): >60 ML/MIN/1.73 M^2
EST. GFR  (NON AFRICAN AMERICAN): >60 ML/MIN/1.73 M^2
GLUCOSE SERPL-MCNC: 109 MG/DL (ref 70–110)
HCT VFR BLD AUTO: 39.8 % (ref 40–54)
HDLC SERPL-MCNC: 41 MG/DL (ref 40–75)
HDLC SERPL: 34.2 % (ref 20–50)
HGB BLD-MCNC: 13 G/DL (ref 14–18)
IMM GRANULOCYTES # BLD AUTO: 0.04 K/UL (ref 0–0.04)
IMM GRANULOCYTES NFR BLD AUTO: 0.5 % (ref 0–0.5)
LDLC SERPL CALC-MCNC: 59.2 MG/DL (ref 63–159)
LYMPHOCYTES # BLD AUTO: 2 K/UL (ref 1–4.8)
LYMPHOCYTES NFR BLD: 24 % (ref 18–48)
MCH RBC QN AUTO: 33.9 PG (ref 27–31)
MCHC RBC AUTO-ENTMCNC: 32.7 G/DL (ref 32–36)
MCV RBC AUTO: 104 FL (ref 82–98)
MONOCYTES # BLD AUTO: 1 K/UL (ref 0.3–1)
MONOCYTES NFR BLD: 11.7 % (ref 4–15)
NEUTROPHILS # BLD AUTO: 5.1 K/UL (ref 1.8–7.7)
NEUTROPHILS NFR BLD: 60.8 % (ref 38–73)
NONHDLC SERPL-MCNC: 79 MG/DL
NRBC BLD-RTO: 0 /100 WBC
PLATELET # BLD AUTO: 191 K/UL (ref 150–350)
PMV BLD AUTO: 14.1 FL (ref 9.2–12.9)
POTASSIUM SERPL-SCNC: 5 MMOL/L (ref 3.5–5.1)
PROT SERPL-MCNC: 6.8 G/DL (ref 6–8.4)
RBC # BLD AUTO: 3.83 M/UL (ref 4.6–6.2)
SODIUM SERPL-SCNC: 140 MMOL/L (ref 136–145)
TRIGL SERPL-MCNC: 99 MG/DL (ref 30–150)
WBC # BLD AUTO: 8.39 K/UL (ref 3.9–12.7)

## 2020-06-29 PROCEDURE — 36415 COLL VENOUS BLD VENIPUNCTURE: CPT | Mod: PO

## 2020-06-29 PROCEDURE — 80053 COMPREHEN METABOLIC PANEL: CPT

## 2020-06-29 PROCEDURE — 80061 LIPID PANEL: CPT

## 2020-06-29 PROCEDURE — 85025 COMPLETE CBC W/AUTO DIFF WBC: CPT

## 2020-07-02 ENCOUNTER — TELEPHONE (OUTPATIENT)
Dept: DERMATOLOGY | Facility: CLINIC | Age: 85
End: 2020-07-02

## 2020-07-02 NOTE — TELEPHONE ENCOUNTER
Spoke with patient, he reports that he is on day 4 of the efudex combo.  His forehead is crusty, very tender and bleeding at times.  Also his eye is swollen, he reports that he can see just fine and has no drainage or pain in his eye.  Advised patient to stop Efudex and will let Dr. Williamson know, if eye gets worse he will call the on call doctor over the weekend.

## 2020-07-02 NOTE — TELEPHONE ENCOUNTER
----- Message from Laura Yanes MA sent at 7/2/2020  4:51 PM CDT -----  Contact: Pt    ----- Message -----  From: Becky Cuevas  Sent: 7/2/2020   4:22 PM CDT  To: [facility] Staff    Pt called regarding  possible reaction to medication. Pt asked for a call back.            Pt contact # [phone].      Thanks

## 2020-08-27 ENCOUNTER — TELEPHONE (OUTPATIENT)
Dept: CARDIOLOGY | Facility: CLINIC | Age: 85
End: 2020-08-27

## 2020-08-27 NOTE — TELEPHONE ENCOUNTER
----- Message from Silvia Dailey sent at 8/27/2020  8:52 AM CDT -----  Regarding: Need to know about test  Contact: Aleks  Type:  Needs Medical Advice    Who Called: Aleks  Would the patient rather a call back or a response via [facility]? callback  Best Call Back Number: [phone]  Additional Information: Pt is requesting a callback from nurse regarding test that was supposed to be done

## 2020-09-03 NOTE — PROGRESS NOTES
This note was created by combination of typed  and M-Modal dictation.  Transcription errors may be present.  If there are any questions, please contact me.    Assessment and Plan:   Benzodiazepine dependence  Insomnia, unspecified type  -stable on lorazepam longstanding  Not on other controlled substances  Stable no changes.  Refill to pharmacy 6 months.  -     LORazepam (ATIVAN) 0.5 MG tablet; TAKE ONE-HALF to 1 TABLET(S) BY MOUTH AT NIGHT  Dispense: 90 tablet; Refill: 1    Eczema, unspecified type  -refilled lotrisone for the legs and  area; fluocinolone for the face  -     clotrimazole-betamethasone 1-0.05% (LOTRISONE) cream; apply topically to affected area 2 times daily as needed;  area  Dispense: 45 g; Refill: 2  -     fluocinolone (SYNALAR) 0.025 % cream; Apply topically 2 (two) times daily. To face as needed  Dispense: 60 g; Refill: 2    Pure hypercholesterolemia  Essential hypertension  Coronary artery disease involving native coronary artery of native heart without angina pectoris  Bilateral carotid artery stenosis  -managed by cardiology. BP stable. He takes lasix PRN  No change on metoprolol and lisinopril and atorvastatin    Need for shingles vaccine  -     (In Office Administered) Zoster Recombinant Vaccine    Medications Discontinued During This Encounter   Medication Reason    HYDROcodone-acetaminophen (NORCO) 5-325 mg per tablet Therapy completed    clotrimazole-betamethasone 1-0.05% (LOTRISONE) cream Reorder    fluocinolone (SYNALAR) 0.025 % cream Reorder    LORazepam (ATIVAN) 0.5 MG tablet Reorder    desonide 0.05% (DESOWEN) 0.05 % Oint Alternate therapy    clotrimazole (LOTRIMIN) 1 % cream Alternate therapy       meds sent this encounter:  Medications Ordered This Encounter   Medications    clotrimazole-betamethasone 1-0.05% (LOTRISONE) cream     Sig: apply topically to affected area 2 times daily as needed;  area     Dispense:  45 g     Refill:  2    fluocinolone  (SYNALAR) 0.025 % cream     Sig: Apply topically 2 (two) times daily. To face as needed     Dispense:  60 g     Refill:  2     Instead of desonide    LORazepam (ATIVAN) 0.5 MG tablet     Sig: TAKE ONE-HALF to 1 TABLET(S) BY MOUTH AT NIGHT     Dispense:  90 tablet     Refill:  1       Follow Up: No follow-ups on file. OV 6 months will need shingrix #2 at that time.    Subjective:     Chief Complaint   Patient presents with    Insomnia    Rash       HPI  Aleks is a 87 y.o. male, last appointment with this clinic was Visit date not found.    Last visit with me in January  Insomnia and benzodiazepine dependence.  Stable on current regimen.  We had discussed risks and benefits of continued benzodiazepine use at his last visit.    Followed by Derm for AK and PCC  Followed by cardiology for coronary artery disease, carotid disease, history of stenting.  Had presented complaining of worsening edema.  Snoring.  Considered sleep apnea but the patient did not want to evaluate sleep apnea.  Plan in June was check echo and carotid ultrasound.  Treat pedal edema with Lasix.    06/29/2020 CBC mild microcytic anemia seen previously  CMP normal creatinine  Lipid was good     lorazepam 7/27, 4/20, 3/14    Takes lisinopril and metoprolol  Not really using the lasix    No issus with the lorazepam. Sleeps well, 6-7 hours.     Requesting RF on the lortisone and the desowen. For rash on face and on the legs. PRN use on the face and legs. And sometimes even on the penis.   lotrisone he uses on the legs and  area PRN  And the fluocinolone for the face.    Finds that wine starts to upset his stomach. So he has tried OTC pepcid and that seems to help.    Patient Care Team:  Deshaun Goel MD as PCP - General (Internal Medicine)  Deshaun Goel MD as PCP - MSSP Attributed  Magnus Rashid MD as Consulting Physician (Dermatology)  Gillian Williamson MD as Consulting Physician (Dermatology)  Jae Weathers MA as Care  Coordinator    Patient Active Problem List    Diagnosis Date Noted    ARGUETA (dyspnea on exertion) 06/15/2020    Leg swelling 06/15/2020    Benzodiazepine dependence 01/15/2020    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 [facility]    Essential hypertension 10/29/2015    Coronary artery disease involving native coronary artery without angina pectoris s/p CABG;  2011 [facility] 4/5 grafts patent 10/29/2015     s/p CABG x 5 1998  [facility] 2/2011 4/5 grafts patent      Insomnia hx trazodone ineffective and with SE 08/01/2014     10/2017 trazodone ineffective and SE of dizziness      Postsurgical aortocoronary bypass status 11/29/2012     CABG x 5 (1988 SVG to OM1 (sequential graft), SVG to OM2 (sequential         graft), LIMA to LAD (single graft), SVG to PDA (single graft), HUY to       D1 (single graft))       Bilateral carotid artery stenosis 11/06/2012     There is 60 - 69% right Internal Carotid stenosis.                           There is 40 - 49% left Internal Carotid stenosis.   July 2011      Pure hypercholesterolemia 11/06/2012    Angina pectoris syndrome 11/06/2012       PAST MEDICAL HISTORY:  Past Medical History:   Diagnosis Date    Allergy     seasonal    Arthritis     Basal cell carcinoma 10 years    right ear     Basal cell carcinoma 08/24/2017    Left earlobe     Basal cell carcinoma 11/14/2020    Left wrist    Basal cell carcinoma 05/18/2020    left nasal tip    CAD (coronary artery disease) 11/29/2012    Hyperlipidemia     Hypertension     Joint pain     Squamous cell carcinoma 06/26/2017    Squamous cell carcinoma of skin 10/2019    right cheek (cryosurgery)        PAST SURGICAL HISTORY:  Past Surgical History:   Procedure Laterality Date    CATARACT EXTRACTION      CORONARY  ARTERY BYPASS GRAFT         SOCIAL HISTORY:  Social History     Socioeconomic History    Marital status:      Spouse name: Not on file    Number of children: Not on file    Years of education: Not on file    Highest education level: Not on file   Occupational History    Occupation: 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  .  Six kids. [place] [employer] vet.  [employer] [facility] [place].      Social Needs    Financial resource strain: Not on file    Food insecurity     Worry: Not on file     Inability: Not on file    Transportation needs     Medical: Not on file     Non-medical: Not on file   Tobacco Use    Smoking status: Former Smoker    Smokeless tobacco: Never Used   Substance and Sexual Activity    Alcohol use: Yes     Comment: Red wine daily    Drug use: No    Sexual activity: Not on file   Lifestyle    Physical activity     Days per week: Not on file     Minutes per session: Not on file    Stress: Not on file   Relationships    Social connections     Talks on phone: Not on file     Gets together: Not on file     Attends Holiness service: Not on file     Active member of club or organization: Not on file     Attends meetings of clubs or organizations: Not on file     Relationship status: Not on file   Other Topics Concern    Not on file   Social History Narrative     x 60 yr.   [employer] [place].         ALLERGIES AND MEDICATIONS: updated and reviewed.  Review of patient's allergies indicates:   Allergen Reactions    Iodine and iodide containing products Anaphylaxis    Adhesive     Dye Other (See Comments)    Trazodone      dizziness    Keflex [cephalexin] Diarrhea and Nausea And Vomiting       Medication List with Changes/Refills   Current Medications    ASPIRIN (ASPIRIN LOW DOSE) 81 MG EC TABLET    Take 81 mg by mouth once daily.     ATORVASTATIN (LIPITOR) 40 MG TABLET    Take 1 tablet (40 mg total) by mouth once daily.    CLOTRIMAZOLE-BETAMETHASONE 1-0.05% (LOTRISONE) CREAM    apply topically to  affected area 2 times daily as needed;  area    FLUOCINOLONE (SYNALAR) 0.025 % CREAM    Apply topically 2 (two) times daily.    FUROSEMIDE (LASIX) 20 MG TABLET    Take 1 tablet (20 mg total) by mouth once daily.    HYDROCODONE-ACETAMINOPHEN (NORCO) 5-325 MG PER TABLET    Take 1 tablet by mouth every 6 (six) hours as needed for Pain.    LISINOPRIL 10 MG TABLET    Take 1 tablet (10 mg total) by mouth once daily.    LORAZEPAM (ATIVAN) 0.5 MG TABLET    TAKE ONE-HALF to 1 TABLET(S) BY MOUTH AT NIGHT    METOPROLOL SUCCINATE (TOPROL-XL) 100 MG 24 HR TABLET    Take 1 tablet (100 mg total) by mouth once daily.    MULTIVITAMIN ORAL    Take 1 tablet by mouth once daily.     NITROGLYCERIN (NITROSTAT) 0.4 MG SL TABLET    Place 1 tablet under the tongue as needed.         Review of Systems   All other systems reviewed and are negative.      Objective:   Physical Exam   Vitals:    09/04/20 1246   BP: 114/78   BP Location: Right arm   Patient Position: Sitting   BP Method: Medium (Manual)   Pulse: 64   Temp: 97.7 °F (36.5 °C)   TempSrc: Temporal   SpO2: 95%   Weight: 98.9 kg (218 lb)   Height: 6' (1.829 m)    Body mass index is 29.57 kg/m².            Physical Exam  Constitutional:       General: He is not in acute distress.     Appearance: He is well-developed.   Cardiovascular:      Rate and Rhythm: Normal rate and regular rhythm.      Heart sounds: Normal heart sounds. No murmur.   Pulmonary:      Effort: Pulmonary effort is normal.      Breath sounds: Normal breath sounds.   Musculoskeletal: Normal range of motion.      Right lower leg: Edema present.      Left lower leg: Edema present.   Skin:     General: Skin is warm and dry.   Neurological:      Mental Status: He is alert and oriented to person, place, and time.      Coordination: Coordination normal.   Psychiatric:         Behavior: Behavior normal.         Thought Content: Thought content normal.

## 2020-09-04 ENCOUNTER — OFFICE VISIT (OUTPATIENT)
Dept: FAMILY MEDICINE | Facility: CLINIC | Age: 85
End: 2020-09-04
Payer: MEDICARE

## 2020-09-04 VITALS
OXYGEN SATURATION: 95 % | BODY MASS INDEX: 29.53 KG/M2 | WEIGHT: 218 LBS | TEMPERATURE: 98 F | HEIGHT: 72 IN | HEART RATE: 64 BPM | SYSTOLIC BLOOD PRESSURE: 114 MMHG | DIASTOLIC BLOOD PRESSURE: 78 MMHG

## 2020-09-04 DIAGNOSIS — I65.23 BILATERAL CAROTID ARTERY STENOSIS: ICD-10-CM

## 2020-09-04 DIAGNOSIS — Z23 NEED FOR SHINGLES VACCINE: ICD-10-CM

## 2020-09-04 DIAGNOSIS — R21 RASH: ICD-10-CM

## 2020-09-04 DIAGNOSIS — F13.20 BENZODIAZEPINE DEPENDENCE: Primary | ICD-10-CM

## 2020-09-04 DIAGNOSIS — I10 ESSENTIAL HYPERTENSION: ICD-10-CM

## 2020-09-04 DIAGNOSIS — L30.9 ECZEMA, UNSPECIFIED TYPE: ICD-10-CM

## 2020-09-04 DIAGNOSIS — I25.10 CORONARY ARTERY DISEASE INVOLVING NATIVE CORONARY ARTERY OF NATIVE HEART WITHOUT ANGINA PECTORIS: ICD-10-CM

## 2020-09-04 DIAGNOSIS — G47.00 INSOMNIA, UNSPECIFIED TYPE: ICD-10-CM

## 2020-09-04 DIAGNOSIS — E78.00 PURE HYPERCHOLESTEROLEMIA: ICD-10-CM

## 2020-09-04 PROCEDURE — 99214 PR OFFICE/OUTPT VISIT, EST, LEVL IV, 30-39 MIN: ICD-10-PCS | Mod: S$PBB,,, | Performed by: INTERNAL MEDICINE

## 2020-09-04 PROCEDURE — 99214 OFFICE O/P EST MOD 30 MIN: CPT | Mod: S$PBB,,, | Performed by: INTERNAL MEDICINE

## 2020-09-04 PROCEDURE — 99213 OFFICE O/P EST LOW 20 MIN: CPT | Mod: PBBFAC,PO | Performed by: INTERNAL MEDICINE

## 2020-09-04 PROCEDURE — 99999 PR PBB SHADOW E&M-EST. PATIENT-LVL III: ICD-10-PCS | Mod: PBBFAC,,, | Performed by: INTERNAL MEDICINE

## 2020-09-04 PROCEDURE — 90750 HZV VACC RECOMBINANT IM: CPT | Mod: PBBFAC,PO

## 2020-09-04 PROCEDURE — 99999 PR PBB SHADOW E&M-EST. PATIENT-LVL III: CPT | Mod: PBBFAC,,, | Performed by: INTERNAL MEDICINE

## 2020-09-04 RX ORDER — LORAZEPAM 0.5 MG/1
TABLET ORAL
Qty: 90 TABLET | Refills: 1 | Status: SHIPPED | OUTPATIENT
Start: 2020-09-04 | End: 2021-04-19

## 2020-09-04 RX ORDER — DESONIDE 0.5 MG/G
OINTMENT TOPICAL
Status: CANCELLED | OUTPATIENT
Start: 2020-09-04

## 2020-09-04 RX ORDER — CLOTRIMAZOLE AND BETAMETHASONE DIPROPIONATE 10; .64 MG/G; MG/G
CREAM TOPICAL
Qty: 45 G | Refills: 2 | Status: SHIPPED | OUTPATIENT
Start: 2020-09-04 | End: 2021-09-02 | Stop reason: SDUPTHER

## 2020-09-04 RX ORDER — FLUOCINOLONE ACETONIDE 0.25 MG/G
CREAM TOPICAL 2 TIMES DAILY
Qty: 60 G | Refills: 2 | Status: SHIPPED | OUTPATIENT
Start: 2020-09-04 | End: 2021-11-15

## 2020-09-04 RX ORDER — DESONIDE 0.5 MG/G
OINTMENT TOPICAL
COMMUNITY
Start: 2019-04-05 | End: 2020-09-04 | Stop reason: ALTCHOICE

## 2020-09-04 RX ORDER — CLOTRIMAZOLE 1 %
CREAM (GRAM) TOPICAL
COMMUNITY
Start: 2019-04-05 | End: 2020-09-04 | Stop reason: ALTCHOICE

## 2020-09-23 ENCOUNTER — PATIENT OUTREACH (OUTPATIENT)
Dept: ADMINISTRATIVE | Facility: OTHER | Age: 85
End: 2020-09-23

## 2020-09-24 ENCOUNTER — OFFICE VISIT (OUTPATIENT)
Dept: DERMATOLOGY | Facility: CLINIC | Age: 85
End: 2020-09-24
Payer: MEDICARE

## 2020-09-24 DIAGNOSIS — L57.0 AK (ACTINIC KERATOSIS): ICD-10-CM

## 2020-09-24 DIAGNOSIS — D48.5 NEOPLASM OF UNCERTAIN BEHAVIOR OF SKIN: Primary | ICD-10-CM

## 2020-09-24 PROCEDURE — 99213 OFFICE O/P EST LOW 20 MIN: CPT | Mod: 25,S$PBB,, | Performed by: DERMATOLOGY

## 2020-09-24 PROCEDURE — 99213 PR OFFICE/OUTPT VISIT, EST, LEVL III, 20-29 MIN: ICD-10-PCS | Mod: 25,S$PBB,, | Performed by: DERMATOLOGY

## 2020-09-24 PROCEDURE — 99999 PR PBB SHADOW E&M-EST. PATIENT-LVL III: CPT | Mod: PBBFAC,,, | Performed by: DERMATOLOGY

## 2020-09-24 PROCEDURE — 99999 PR PBB SHADOW E&M-EST. PATIENT-LVL III: ICD-10-PCS | Mod: PBBFAC,,, | Performed by: DERMATOLOGY

## 2020-09-24 PROCEDURE — 11102 TANGNTL BX SKIN SINGLE LES: CPT | Mod: PBBFAC | Performed by: DERMATOLOGY

## 2020-09-24 PROCEDURE — 88305 TISSUE EXAM BY PATHOLOGIST: ICD-10-PCS | Mod: 26,,, | Performed by: PATHOLOGY

## 2020-09-24 PROCEDURE — 11102 TANGNTL BX SKIN SINGLE LES: CPT | Mod: S$PBB,,, | Performed by: DERMATOLOGY

## 2020-09-24 PROCEDURE — 99213 OFFICE O/P EST LOW 20 MIN: CPT | Mod: PBBFAC,25 | Performed by: DERMATOLOGY

## 2020-09-24 PROCEDURE — 88305 TISSUE EXAM BY PATHOLOGIST: CPT | Mod: 26,,, | Performed by: PATHOLOGY

## 2020-09-24 PROCEDURE — 88305 TISSUE EXAM BY PATHOLOGIST: CPT | Performed by: PATHOLOGY

## 2020-09-24 PROCEDURE — 11102 PR TANGENTIAL BIOPSY, SKIN, SINGLE LESION: ICD-10-PCS | Mod: S$PBB,,, | Performed by: DERMATOLOGY

## 2020-09-24 NOTE — PATIENT INSTRUCTIONS

## 2020-09-24 NOTE — PROGRESS NOTES
Subjective:       Patient ID:  Aleks Brown is a 87 y.o. male who presents for   Chief Complaint   Patient presents with    Skin Check     efudex f/u    Lesion     right foot     History of Present Illness: The patient presents for follow up of skin check.    The patient was last seen on: 6/22/20 for treatment of actinic keratoses with efudex/dovonex bid x 5 days which have resolved.   H/o bcc  Other skin complaints:   Patient with new complaint of lesion(s)  Location: right lateral foot  Duration: 2 weeks  Symptoms: bled after friction  Relieving factors/Previous treatments: none          Review of Systems   Skin: Positive for wears hat (always). Negative for daily sunscreen use, activity-related sunscreen use and recent sunburn.   Hematologic/Lymphatic: Bruises/bleeds easily (on asa).        Objective:    Physical Exam   Constitutional: He appears well-developed and well-nourished. No distress.   Neurological: He is alert and oriented to person, place, and time. He is not disoriented.   Psychiatric: He has a normal mood and affect.   Skin:   Areas Examined (abnormalities noted in diagram):   Head / Face Inspection Performed  RLE Inspected                  Diagram Legend     Erythematous scaling macule/papule c/w actinic keratosis       Vascular papule c/w angioma      Pigmented verrucoid papule/plaque c/w seborrheic keratosis      Yellow umbilicated papule c/w sebaceous hyperplasia      Irregularly shaped tan macule c/w lentigo     1-2 mm smooth white papules consistent with Milia      Movable subcutaneous cyst with punctum c/w epidermal inclusion cyst      Subcutaneous movable cyst c/w pilar cyst      Firm pink to brown papule c/w dermatofibroma      Pedunculated fleshy papule(s) c/w skin tag(s)      Evenly pigmented macule c/w junctional nevus     Mildly variegated pigmented, slightly irregular-bordered macule c/w mildly atypical nevus      Flesh colored to evenly pigmented papule c/w intradermal  nevus       Pink pearly papule/plaque c/w basal cell carcinoma      Erythematous hyperkeratotic cursted plaque c/w SCC      Surgical scar with no sign of skin cancer recurrence      Open and closed comedones      Inflammatory papules and pustules      Verrucoid papule consistent consistent with wart     Erythematous eczematous patches and plaques     Dystrophic onycholytic nail with subungual debris c/w onychomycosis     Umbilicated papule    Erythematous-base heme-crusted tan verrucoid plaque consistent with inflamed seborrheic keratosis     Erythematous Silvery Scaling Plaque c/w Psoriasis     See annotation              Assessment / Plan:      Pathology Orders:     Normal Orders This Visit    Specimen to Pathology, Dermatology     Questions:    Procedure Type: Dermatology and skin neoplasms    Number of Specimens: 1    ------------------------: -------------------------    Spec 1 Procedure: Biopsy    Spec 1 Clinical Impression: r/o adnexal tumor vs scc    Spec 1 Source: right dorsal foot        Neoplasm of uncertain behavior of skin  -     Specimen to Pathology, Dermatology  Shave biopsy procedure note:    Shave biopsy performed after verbal consent including risk of infection, scar, recurrence, need for additional treatment of site. Area prepped with alcohol, anesthetized with approximately 1.0cc of 1% lidocaine with epinephrine. Lesional tissue shaved with razor blade. Hemostasis achieved with application of aluminum chloride followed by hyfrecation. No complications. Dressing applied. Wound care explained.        AK (actinic keratosis)  S/p efudex/dovonex bilateral cheeks -- looks great             Follow up in about 6 months (around 3/24/2021).

## 2020-09-28 ENCOUNTER — TELEPHONE (OUTPATIENT)
Dept: DERMATOLOGY | Facility: CLINIC | Age: 85
End: 2020-09-28

## 2020-09-28 ENCOUNTER — PATIENT MESSAGE (OUTPATIENT)
Dept: DERMATOLOGY | Facility: CLINIC | Age: 85
End: 2020-09-28

## 2020-09-28 LAB
FINAL PATHOLOGIC DIAGNOSIS: NORMAL
GROSS: NORMAL

## 2020-09-28 RX ORDER — DOXYCYCLINE 100 MG/1
CAPSULE ORAL
Qty: 14 CAPSULE | Refills: 0 | Status: SHIPPED | OUTPATIENT
Start: 2020-09-28 | End: 2021-02-19 | Stop reason: ALTCHOICE

## 2020-09-28 NOTE — TELEPHONE ENCOUNTER
----- Message from Carolyn Ty sent at 9/28/2020  1:37 PM CDT -----  Patient called to speak with the doctor. Gave no further details.    would like a callback at [phone]    Thanks  KB

## 2020-09-28 NOTE — TELEPHONE ENCOUNTER
Spoke with patient, notified him that I forwarded his message to Dr. Williamson and am awaiting her response.  Also that his biopsy results are not back yet.  He verbalized understanding.

## 2020-09-28 NOTE — TELEPHONE ENCOUNTER
----- Message from [place]ula Carolyn sent at 9/28/2020  2:50 PM CDT -----  Contact: Aleks tel:   [phone]  Caller says he is only putting Vaseline on the area.    Has not put anything else on it.   Looking fo the results.    Pls call tel:    the wife:  [phone]   /   Pt. Is having problems with his phone.   Call the wife's phone no.      Sent pictures from the website.  Has no pain, and does not even feel it anymore.  Pls. Call.

## 2020-09-29 ENCOUNTER — PATIENT MESSAGE (OUTPATIENT)
Dept: OTHER | Facility: OTHER | Age: 85
End: 2020-09-29

## 2020-10-08 ENCOUNTER — OFFICE VISIT (OUTPATIENT)
Dept: FAMILY MEDICINE | Facility: CLINIC | Age: 85
End: 2020-10-08
Payer: MEDICARE

## 2020-10-08 VITALS
SYSTOLIC BLOOD PRESSURE: 110 MMHG | RESPIRATION RATE: 18 BRPM | DIASTOLIC BLOOD PRESSURE: 60 MMHG | OXYGEN SATURATION: 96 % | HEART RATE: 66 BPM | HEIGHT: 72 IN | WEIGHT: 223.75 LBS | BODY MASS INDEX: 30.31 KG/M2
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
## 2020-10-08 DIAGNOSIS — M79.672 LEFT FOOT PAIN: Primary | ICD-10-CM

## 2020-10-08 PROCEDURE — 99213 OFFICE O/P EST LOW 20 MIN: CPT | Mod: S$PBB,,, | Performed by: FAMILY MEDICINE

## 2020-10-08 PROCEDURE — 99999 PR PBB SHADOW E&M-EST. PATIENT-LVL IV: CPT | Mod: PBBFAC,,, | Performed by: FAMILY MEDICINE

## 2020-10-08 PROCEDURE — 99999 PR PBB SHADOW E&M-EST. PATIENT-LVL IV: ICD-10-PCS | Mod: PBBFAC,,, | Performed by: FAMILY MEDICINE

## 2020-10-08 PROCEDURE — 99214 OFFICE O/P EST MOD 30 MIN: CPT | Mod: PBBFAC,PO | Performed by: FAMILY MEDICINE

## 2020-10-08 PROCEDURE — 99213 PR OFFICE/OUTPT VISIT, EST, LEVL III, 20-29 MIN: ICD-10-PCS | Mod: S$PBB,,, | Performed by: FAMILY MEDICINE

## 2020-10-08 NOTE — PATIENT INSTRUCTIONS
· Continue your Tylenol. Apply ice for 15 minutes at a time every hour.  · We will schedule your x-ray. I will call with results.   · We will follow up based on the result of the x-ray.

## 2020-10-08 NOTE — PROGRESS NOTES
Assessment & Plan  Problem List Items Addressed This Visit     None      Visit Diagnoses     Left foot pain    -  Primary    Continue OTC Tylenol & apply ice to the affected area 15 minutes at a time. X-ray left foot pending & will manage accordingly. F/u TBD based on imaging result    Relevant Orders    X-Ray Foot Complete Left    Fall, initial encounter        Relevant Orders    X-Ray Foot Complete Left          Follow-up: Follow up TBD.    ______________________________________________________________________    Chief Complaint  Chief Complaint   Patient presents with    Toe Injury     Broken Toe?       HPI  Aleks Brown is a 87 y.o. male new to me with multiple medical diagnoses as listed in the medical history and problem list that presents to the office c/o pain and ecchymosis of the 4th digit of the left foot after tripping on a flower pot yesterday. Pain was 10/10 at the worst and reduced to 5/10 after taking Tylenol. He denies reduced ROM or edema of the toes or foot. Denies hitting his head. Able to get up on his own.       PAST MEDICAL HISTORY:  Past Medical History:   Diagnosis Date    Allergy     seasonal    Arthritis     Basal cell carcinoma 10 years    right ear     Basal cell carcinoma 08/24/2017    Left earlobe     Basal cell carcinoma 11/14/2020    Left wrist    Basal cell carcinoma 05/18/2020    left nasal tip    CAD (coronary artery disease) 11/29/2012    Hyperlipidemia     Hypertension     Joint pain     Squamous cell carcinoma 06/26/2017    Squamous cell carcinoma of skin 10/2019    right cheek (cryosurgery)        PAST SURGICAL HISTORY:  Past Surgical History:   Procedure Laterality Date    CATARACT EXTRACTION      CORONARY ARTERY BYPASS GRAFT         SOCIAL HISTORY:  Social History     Socioeconomic History    Marital status:      Spouse name: Not on file    Number of children: Not on file    Years of education: Not on file    Highest education level: Not on  file   Occupational History    Occupation: 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  .  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 [place] War vet.  [employer] [facility] [place].      Social Needs    Financial resource strain: Not on file    Food insecurity     Worry: Not on file     Inability: Not on file    Transportation needs     Medical: Not on file     Non-medical: Not on file   Tobacco Use    Smoking status: Former Smoker    Smokeless tobacco: Never Used   Substance and Sexual Activity    Alcohol use: Yes     Comment: Red wine daily    Drug use: No    Sexual activity: Not on file   Lifestyle    Physical activity     Days per week: Not on file     Minutes per session: Not on file    Stress: Not on file   Relationships    Social connections     Talks on phone: Not on file     Gets together: Not on file     Attends Anabaptist service: Not on file     Active member of club or organization: Not on file     Attends meetings of clubs or organizations: Not on file     Relationship status: Not on file   Other Topics Concern    Not on file   Social History Narrative     x 60 yr.  [employer].         FAMILY HISTORY:  Family History   Problem Relation Age of Onset    Skin cancer Mother     Leukemia Father     Diabetes Sister     Peripheral vascular disease Sister     Cancer Brother     No Known Problems Daughter     No Known Problems Daughter     No Known Problems Daughter     No Known Problems Daughter     No Known Problems Son     No Known Problems Son     Melanoma Neg Hx        ALLERGIES AND MEDICATIONS: updated and reviewed.  Review of patient's allergies indicates:   Allergen Reactions    Iodine and iodide containing products Anaphylaxis    Adhesive     Dye Other (See Comments)    Trazodone      dizziness    Keflex [cephalexin] Diarrhea and Nausea And Vomiting     Current Outpatient Medications   Medication Sig Dispense Refill    aspirin (ASPIRIN LOW DOSE) 81 MG EC tablet Take 81 mg by mouth once daily.       atorvastatin  (LIPITOR) 40 MG tablet Take 1 tablet (40 mg total) by mouth once daily. 90 tablet 3    clotrimazole-betamethasone 1-0.05% (LOTRISONE) cream apply topically to affected area 2 times daily as needed;  area 45 g 2    doxycycline (VIBRAMYCIN) 100 MG Cap Take 1 po bid with food and not within 1 hour prior to lying down 14 capsule 0    fluocinolone (SYNALAR) 0.025 % cream Apply topically 2 (two) times daily. To face as needed 60 g 2    furosemide (LASIX) 20 MG tablet Take 1 tablet (20 mg total) by mouth once daily. 90 tablet 3    lisinopril 10 MG tablet Take 1 tablet (10 mg total) by mouth once daily. 90 tablet 3    LORazepam (ATIVAN) 0.5 MG tablet TAKE ONE-HALF to 1 TABLET(S) BY MOUTH AT NIGHT 90 tablet 1    metoprolol succinate (TOPROL-XL) 100 MG 24 hr tablet Take 1 tablet (100 mg total) by mouth once daily. 90 tablet 3    MULTIVITAMIN ORAL Take 1 tablet by mouth once daily.       nitroGLYCERIN (NITROSTAT) 0.4 MG SL tablet Place 1 tablet under the tongue as needed.        No current facility-administered medications for this visit.          ROS  Review of Systems   Constitutional: Negative for activity change.   Musculoskeletal: Positive for gait problem (chronic due to frostbite on his right foot, but no new changes since injury). Negative for joint swelling.   Skin: Positive for color change (ecchymosis 4th digit L foot). Negative for wound.   Neurological: Negative for weakness and headaches.           Physical Exam  Vitals:    10/08/20 1607   BP: 110/60   BP Location: Right arm   Patient Position: Sitting   BP Method: Large (Manual)   Pulse: 66   Resp: 18   SpO2: 96%   Weight: 101.5 kg (223 lb 12.3 oz)   Height: 6' (1.829 m)    Body mass index is 30.35 kg/m².  Weight: 101.5 kg (223 lb 12.3 oz)   Height: 6' (182.9 cm)   Physical Exam  Constitutional:       General: He is not in acute distress.     Appearance: Normal appearance.   HENT:      Head: Normocephalic and atraumatic.   Cardiovascular:      Rate  and Rhythm: Normal rate and regular rhythm.      Heart sounds: Normal heart sounds.   Pulmonary:      Effort: Pulmonary effort is normal. No respiratory distress.      Breath sounds: Normal breath sounds.   Musculoskeletal:         General: Tenderness (4th digit L foot) present. No deformity.      Left lower leg: Edema (patient states this is chronic, no changes since fall) present.   Skin:     General: Skin is warm and dry.      Findings: Bruising (4th digit L foot) present.   Neurological:      General: No focal deficit present.      Mental Status: He is alert and oriented to person, place, and time.   Psychiatric:         Mood and Affect: Mood normal.         Behavior: Behavior normal.           Health Maintenance       Date Due Completion Date    Influenza Vaccine (1) 08/01/2020 9/19/2019    Shingles Vaccine (2 of 2) 10/30/2020 9/4/2020    Aspirin/Antiplatelet Therapy 09/04/2021 9/4/2020    Lipid Panel 06/29/2025 6/29/2020    TETANUS VACCINE 09/14/2028 9/14/2018

## 2020-10-12 ENCOUNTER — HOSPITAL ENCOUNTER (OUTPATIENT)
Dept: RADIOLOGY | Facility: HOSPITAL | Age: 85
Discharge: HOME | End: 2020-10-12
Attending: FAMILY MEDICINE
Payer: MEDICARE

## 2020-10-12 DIAGNOSIS — M79.672 LEFT FOOT PAIN: ICD-10-CM
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
## 2020-10-12 PROCEDURE — 73630 XR FOOT COMPLETE 3 VIEW LEFT: ICD-10-PCS | Mod: 26,LT,, | Performed by: RADIOLOGY

## 2020-10-12 PROCEDURE — 73630 X-RAY EXAM OF FOOT: CPT | Mod: 26,LT,, | Performed by: RADIOLOGY

## 2020-10-12 PROCEDURE — 73630 X-RAY EXAM OF FOOT: CPT | Mod: TC,FY,PO,LT

## 2020-10-13 ENCOUNTER — PROCEDURE VISIT (OUTPATIENT)
Dept: DERMATOLOGY | Facility: CLINIC | Age: 85
End: 2020-10-13
Payer: MEDICARE

## 2020-10-13 VITALS
HEIGHT: 72 IN | DIASTOLIC BLOOD PRESSURE: 80 MMHG | HEART RATE: 49 BPM | WEIGHT: 233 LBS | BODY MASS INDEX: 31.56 KG/M2 | SYSTOLIC BLOOD PRESSURE: 183 MMHG

## 2020-10-13 DIAGNOSIS — C44.722 SQUAMOUS CELL CARCINOMA OF SKIN OF RIGHT LOWER EXTREMITY: Primary | ICD-10-CM

## 2020-10-13 PROCEDURE — 99499 UNLISTED E&M SERVICE: CPT | Mod: S$PBB,,, | Performed by: DERMATOLOGY

## 2020-10-13 PROCEDURE — 17311 MOHS 1 STAGE H/N/HF/G: CPT | Mod: S$PBB,,, | Performed by: DERMATOLOGY

## 2020-10-13 PROCEDURE — 17311: ICD-10-PCS | Mod: S$PBB,,, | Performed by: DERMATOLOGY

## 2020-10-13 PROCEDURE — 99499 NO LOS: ICD-10-PCS | Mod: S$PBB,,, | Performed by: DERMATOLOGY

## 2020-10-13 PROCEDURE — 17311 MOHS 1 STAGE H/N/HF/G: CPT | Mod: PBBFAC | Performed by: DERMATOLOGY

## 2020-10-13 NOTE — PROGRESS NOTES
PROCEDURE: Mohs' Micrographic Surgery    INDICATION: Biopsy-proven skin cancer of cosmetically and functionally important areas, including head, neck, genital, hand, foot, or areas known for having difficulty in healing, such as the lower anterior legs. Tumor with ill-defined borders.    REFERRING MD: Gillian Williamson M.D.    CASE NUMBER:     ANESTHETIC: 4.5 cc 0.5% Lidocaine with Epi 1:200,000 mixed 1:1 with 0.5% Bupivacaine    SURGICAL PREP: Hibiclens    SURGEON: Magnus Rashid MD    ASSISTANTS: Valeria Young PA-C and Kacie Dickson, Surg Tech    PREOPERATIVE DIAGNOSIS: squamous cell carcinoma    POSTOPERATIVE DIAGNOSIS: squamous cell carcinoma    PATHOLOGIC DIAGNOSIS: squamous cell carcinoma- invasive, well differentiated    HISTOLOGY OF SPECIMENS IN FIRST STAGE:   Tumor Type: No tumor seen.    STAGES OF MOHS' SURGERY PERFORMED: 1    TUMOR-FREE PLANE ACHIEVED: Yes    HEMOSTASIS: electrocoagulation     SPECIMENS: 2    LOCATION: right dorsal foot. Location verified with Dr. Williamson's clinical photograph. Patient also verified location.    INITIAL LESION SIZE: 0.6 x 0.7 cm    FINAL DEFECT SIZE: 0.9 x 1.4 cm    WOUND REPAIR/DISPOSITION: When the tumor was completely removed, repair options were discussed with the patient, and it was decided to let the wound heal by second intention. The patient tolerated the procedure well and will consider delayed reconstruction or repair if necessary.    The area was cleaned and dressed appropriately, and the patient was given wound care instructions, as well as an appointment for follow-up evaluation in one month.    Vitals:    10/13/20 1217 10/13/20 1404   BP: (!) 160/65 (!) 183/80   BP Location: Right arm Right arm   Patient Position: Sitting Sitting   BP Method: Small (Automatic) Large (Automatic)   Pulse: (!) 59 (!) 49   Weight: 105.7 kg (233 lb)    Height: 6' (1.829 m)

## 2020-11-16 ENCOUNTER — OFFICE VISIT (OUTPATIENT)
Dept: DERMATOLOGY | Facility: CLINIC | Age: 85
End: 2020-11-16
Payer: MEDICARE

## 2020-11-16 DIAGNOSIS — C44.722 SQUAMOUS CELL CARCINOMA OF RIGHT FOOT: Primary | ICD-10-CM

## 2020-11-16 PROCEDURE — 99212 OFFICE O/P EST SF 10 MIN: CPT | Mod: PBBFAC | Performed by: DERMATOLOGY

## 2020-11-16 PROCEDURE — 99212 OFFICE O/P EST SF 10 MIN: CPT | Mod: S$PBB,,, | Performed by: DERMATOLOGY

## 2020-11-16 PROCEDURE — 99212 PR OFFICE/OUTPT VISIT, EST, LEVL II, 10-19 MIN: ICD-10-PCS | Mod: S$PBB,,, | Performed by: DERMATOLOGY

## 2020-11-16 PROCEDURE — 99999 PR PBB SHADOW E&M-EST. PATIENT-LVL II: CPT | Mod: PBBFAC,,, | Performed by: DERMATOLOGY

## 2020-11-16 PROCEDURE — 99999 PR PBB SHADOW E&M-EST. PATIENT-LVL II: ICD-10-PCS | Mod: PBBFAC,,, | Performed by: DERMATOLOGY

## 2020-11-16 NOTE — PROGRESS NOTES
88 y.o. male patient is here for wound check after surgery.    Patient reports no problems.    WOUND PE:  The right dorsal foot is healing well with good granulation tissue. 40% re-epithelialization. No signs of infection.       IMPRESSION:  Healing operative site from Mohs' surgery SCC, right dorsal foot s/p Mohs with 2nd intention healing, postop week # 5, healing well.    PLAN:    Continue wound care. Switch to medihoney from aquaphor in order to increase healing.   Keep covered daily  Wound rebandaged today.   Demonstrated to patient and wife today.      RTC:  In 1 month..

## 2020-12-11 ENCOUNTER — PATIENT MESSAGE (OUTPATIENT)
Dept: OTHER | Facility: OTHER | Age: 85
End: 2020-12-11

## 2020-12-17 ENCOUNTER — OFFICE VISIT (OUTPATIENT)
Dept: DERMATOLOGY | Facility: CLINIC | Age: 85
End: 2020-12-17
Payer: MEDICARE

## 2020-12-17 DIAGNOSIS — C44.722 SQUAMOUS CELL CARCINOMA OF RIGHT FOOT: Primary | ICD-10-CM

## 2020-12-17 PROCEDURE — 99212 OFFICE O/P EST SF 10 MIN: CPT | Mod: S$PBB,,, | Performed by: DERMATOLOGY

## 2020-12-17 PROCEDURE — 99212 PR OFFICE/OUTPT VISIT, EST, LEVL II, 10-19 MIN: ICD-10-PCS | Mod: S$PBB,,, | Performed by: DERMATOLOGY

## 2020-12-17 NOTE — PROGRESS NOTES
88 y.o. male patient is here for wound check after surgery.    Patient reports no problems r dorsal foot    WOUND PE:  The r dorsal foot wound is well healed with 100% re-epithelialization. Mild firmness and erythema of scar.    IMPRESSION:  Healing operative site from Mohs' surgery SCC right dorsal foot s/p Mohs with 2nd intent healing. Post op week# 8, all healed.    PLAN:  Discontinue wound care.Just keep moist with aquaphor x 1 more week.   Reassured patient that redness and firmness will fade with time.  Daily SPF.  Regular skin checks.    RTC:  In 3-6 months with Gillian Williamson M.D. for skin check or sooner if new concern arises.

## 2021-02-17 ENCOUNTER — OFFICE VISIT (OUTPATIENT)
Dept: FAMILY MEDICINE | Facility: CLINIC | Age: 86
End: 2021-02-17
Payer: MEDICARE

## 2021-02-17 ENCOUNTER — PES CALL (OUTPATIENT)
Dept: ADMINISTRATIVE | Facility: CLINIC | Age: 86
End: 2021-02-17

## 2021-02-17 VITALS
WEIGHT: 220 LBS | BODY MASS INDEX: 29.8 KG/M2 | TEMPERATURE: 98 F | OXYGEN SATURATION: 98 % | DIASTOLIC BLOOD PRESSURE: 56 MMHG | HEIGHT: 72 IN | SYSTOLIC BLOOD PRESSURE: 136 MMHG | HEART RATE: 72 BPM

## 2021-02-17 DIAGNOSIS — Z23 NEED FOR SHINGLES VACCINE: ICD-10-CM

## 2021-02-17 DIAGNOSIS — R10.13 DYSPEPSIA: ICD-10-CM

## 2021-02-17 DIAGNOSIS — M26.622 ARTHRALGIA OF LEFT TEMPOROMANDIBULAR JOINT: ICD-10-CM

## 2021-02-17 DIAGNOSIS — F13.20 BENZODIAZEPINE DEPENDENCE: Primary | ICD-10-CM

## 2021-02-17 DIAGNOSIS — H92.02 ACUTE EAR PAIN, LEFT: ICD-10-CM

## 2021-02-17 PROCEDURE — 99213 PR OFFICE/OUTPT VISIT, EST, LEVL III, 20-29 MIN: ICD-10-PCS | Mod: S$PBB,,, | Performed by: INTERNAL MEDICINE

## 2021-02-17 PROCEDURE — 99213 OFFICE O/P EST LOW 20 MIN: CPT | Mod: PBBFAC,PO | Performed by: INTERNAL MEDICINE

## 2021-02-17 PROCEDURE — 99999 PR PBB SHADOW E&M-EST. PATIENT-LVL III: ICD-10-PCS | Mod: PBBFAC,,, | Performed by: INTERNAL MEDICINE

## 2021-02-17 PROCEDURE — 99213 OFFICE O/P EST LOW 20 MIN: CPT | Mod: S$PBB,,, | Performed by: INTERNAL MEDICINE

## 2021-02-17 PROCEDURE — 90471 IMMUNIZATION ADMIN: CPT | Mod: PBBFAC,PO

## 2021-02-17 PROCEDURE — 99999 PR PBB SHADOW E&M-EST. PATIENT-LVL III: CPT | Mod: PBBFAC,,, | Performed by: INTERNAL MEDICINE

## 2021-02-17 RX ORDER — ALUMINUM HYDROXIDE, MAGNESIUM HYDROXIDE, AND SIMETHICONE 2400; 240; 2400 MG/30ML; MG/30ML; MG/30ML
30 SUSPENSION ORAL
Status: DISCONTINUED | OUTPATIENT
Start: 2021-02-17 | End: 2021-02-19

## 2021-02-17 RX ORDER — AMOXICILLIN 500 MG/1
500 CAPSULE ORAL 3 TIMES DAILY
COMMUNITY
Start: 2021-02-09 | End: 2021-02-19 | Stop reason: SINTOL

## 2021-02-17 RX ORDER — HYDROCODONE BITARTRATE AND ACETAMINOPHEN 5; 325 MG/1; MG/1
1-2 TABLET ORAL
COMMUNITY
Start: 2021-02-12 | End: 2021-03-04 | Stop reason: ALTCHOICE

## 2021-02-17 RX ORDER — NEOMYCIN SULFATE, POLYMYXIN B SULFATE AND HYDROCORTISONE 10; 3.5; 1 MG/ML; MG/ML; [USP'U]/ML
3 SUSPENSION/ DROPS AURICULAR (OTIC) 4 TIMES DAILY
Qty: 6 ML | Refills: 0 | Status: SHIPPED | OUTPATIENT
Start: 2021-02-17 | End: 2021-02-24

## 2021-02-17 RX ORDER — CHLORHEXIDINE GLUCONATE ORAL RINSE 1.2 MG/ML
SOLUTION DENTAL
COMMUNITY
Start: 2021-02-12 | End: 2021-03-04 | Stop reason: ALTCHOICE

## 2021-02-19 ENCOUNTER — OFFICE VISIT (OUTPATIENT)
Dept: FAMILY MEDICINE | Facility: CLINIC | Age: 86
End: 2021-02-19
Payer: MEDICARE

## 2021-02-19 ENCOUNTER — TELEPHONE (OUTPATIENT)
Dept: FAMILY MEDICINE | Facility: CLINIC | Age: 86
End: 2021-02-19

## 2021-02-19 VITALS
RESPIRATION RATE: 15 BRPM | OXYGEN SATURATION: 95 % | HEART RATE: 72 BPM | DIASTOLIC BLOOD PRESSURE: 70 MMHG | BODY MASS INDEX: 29.93 KG/M2 | TEMPERATURE: 98 F | WEIGHT: 221 LBS | HEIGHT: 72 IN | SYSTOLIC BLOOD PRESSURE: 110 MMHG

## 2021-02-19 DIAGNOSIS — T36.0X5A PENICILLIN-INDUCED ALLERGIC RASH: Primary | ICD-10-CM

## 2021-02-19 DIAGNOSIS — L27.0 PENICILLIN-INDUCED ALLERGIC RASH: Primary | ICD-10-CM

## 2021-02-19 PROCEDURE — 99213 PR OFFICE/OUTPT VISIT, EST, LEVL III, 20-29 MIN: ICD-10-PCS | Mod: S$PBB,,, | Performed by: INTERNAL MEDICINE

## 2021-02-19 PROCEDURE — 99213 OFFICE O/P EST LOW 20 MIN: CPT | Mod: S$PBB,,, | Performed by: INTERNAL MEDICINE

## 2021-02-19 PROCEDURE — 99999 PR PBB SHADOW E&M-EST. PATIENT-LVL IV: CPT | Mod: PBBFAC,,, | Performed by: INTERNAL MEDICINE

## 2021-02-19 PROCEDURE — 99999 PR PBB SHADOW E&M-EST. PATIENT-LVL IV: ICD-10-PCS | Mod: PBBFAC,,, | Performed by: INTERNAL MEDICINE

## 2021-02-19 PROCEDURE — 99214 OFFICE O/P EST MOD 30 MIN: CPT | Mod: PBBFAC,PO | Performed by: INTERNAL MEDICINE

## 2021-02-23 ENCOUNTER — TELEPHONE (OUTPATIENT)
Dept: FAMILY MEDICINE | Facility: CLINIC | Age: 86
End: 2021-02-23

## 2021-03-04 ENCOUNTER — OFFICE VISIT (OUTPATIENT)
Dept: FAMILY MEDICINE | Facility: CLINIC | Age: 86
End: 2021-03-04
Payer: MEDICARE

## 2021-03-04 VITALS
DIASTOLIC BLOOD PRESSURE: 59 MMHG | BODY MASS INDEX: 30.22 KG/M2 | OXYGEN SATURATION: 96 % | SYSTOLIC BLOOD PRESSURE: 130 MMHG | HEIGHT: 72 IN | WEIGHT: 223.13 LBS | TEMPERATURE: 98 F | HEART RATE: 67 BPM

## 2021-03-04 DIAGNOSIS — I10 ESSENTIAL HYPERTENSION: ICD-10-CM

## 2021-03-04 DIAGNOSIS — E78.00 PURE HYPERCHOLESTEROLEMIA: ICD-10-CM

## 2021-03-04 DIAGNOSIS — I65.23 BILATERAL CAROTID ARTERY STENOSIS: ICD-10-CM

## 2021-03-04 DIAGNOSIS — M79.12 STERNOCLEIDOMASTOID MUSCLE TENDERNESS: Primary | ICD-10-CM

## 2021-03-04 DIAGNOSIS — I25.10 CORONARY ARTERY DISEASE INVOLVING NATIVE CORONARY ARTERY OF NATIVE HEART WITHOUT ANGINA PECTORIS: ICD-10-CM

## 2021-03-04 DIAGNOSIS — F13.20 BENZODIAZEPINE DEPENDENCE: ICD-10-CM

## 2021-03-04 PROCEDURE — 99213 OFFICE O/P EST LOW 20 MIN: CPT | Mod: S$PBB,,, | Performed by: INTERNAL MEDICINE

## 2021-03-04 PROCEDURE — 99999 PR PBB SHADOW E&M-EST. PATIENT-LVL IV: ICD-10-PCS | Mod: PBBFAC,,, | Performed by: INTERNAL MEDICINE

## 2021-03-04 PROCEDURE — 99213 PR OFFICE/OUTPT VISIT, EST, LEVL III, 20-29 MIN: ICD-10-PCS | Mod: S$PBB,,, | Performed by: INTERNAL MEDICINE

## 2021-03-04 PROCEDURE — 99999 PR PBB SHADOW E&M-EST. PATIENT-LVL IV: CPT | Mod: PBBFAC,,, | Performed by: INTERNAL MEDICINE

## 2021-03-04 PROCEDURE — 99214 OFFICE O/P EST MOD 30 MIN: CPT | Mod: PBBFAC,PO | Performed by: INTERNAL MEDICINE

## 2021-03-04 RX ORDER — AMOXICILLIN 500 MG/1
TABLET, FILM COATED ORAL
COMMUNITY
Start: 2021-02-15 | End: 2021-03-04

## 2021-03-09 ENCOUNTER — OFFICE VISIT (OUTPATIENT)
Dept: FAMILY MEDICINE | Facility: CLINIC | Age: 86
End: 2021-03-09
Payer: MEDICARE

## 2021-03-09 VITALS
OXYGEN SATURATION: 96 % | SYSTOLIC BLOOD PRESSURE: 136 MMHG | WEIGHT: 220.44 LBS | HEIGHT: 72 IN | TEMPERATURE: 98 F | HEART RATE: 56 BPM | DIASTOLIC BLOOD PRESSURE: 80 MMHG | BODY MASS INDEX: 29.86 KG/M2
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
## 2021-03-09 PROCEDURE — 99999 PR PBB SHADOW E&M-EST. PATIENT-LVL IV: CPT | Mod: PBBFAC,,, | Performed by: INTERNAL MEDICINE

## 2021-03-09 PROCEDURE — 99213 PR OFFICE/OUTPT VISIT, EST, LEVL III, 20-29 MIN: ICD-10-PCS | Mod: S$PBB,,, | Performed by: INTERNAL MEDICINE

## 2021-03-09 PROCEDURE — 99999 PR PBB SHADOW E&M-EST. PATIENT-LVL IV: ICD-10-PCS | Mod: PBBFAC,,, | Performed by: INTERNAL MEDICINE

## 2021-03-09 PROCEDURE — 99213 OFFICE O/P EST LOW 20 MIN: CPT | Mod: S$PBB,,, | Performed by: INTERNAL MEDICINE

## 2021-03-09 PROCEDURE — 99214 OFFICE O/P EST MOD 30 MIN: CPT | Mod: PBBFAC,PO | Performed by: INTERNAL MEDICINE

## 2021-03-09 RX ORDER — DOXYCYCLINE 100 MG/1
100 CAPSULE ORAL EVERY 12 HOURS
Qty: 14 CAPSULE | Refills: 0 | Status: SHIPPED | OUTPATIENT
Start: 2021-03-09 | End: 2021-03-16

## 2021-03-12 ENCOUNTER — LAB VISIT (OUTPATIENT)
Dept: LAB | Facility: HOSPITAL | Age: 86
End: 2021-03-12
Attending: INTERNAL MEDICINE
Payer: MEDICARE

## 2021-03-12 ENCOUNTER — TELEPHONE (OUTPATIENT)
Dept: FAMILY MEDICINE | Facility: CLINIC | Age: 86
End: 2021-03-12

## 2021-03-12 DIAGNOSIS — D53.9 MACROCYTIC ANEMIA: Primary | ICD-10-CM

## 2021-03-12 DIAGNOSIS — E78.00 PURE HYPERCHOLESTEROLEMIA: ICD-10-CM

## 2021-03-12 LAB
ALBUMIN SERPL BCP-MCNC: 3.9 G/DL (ref 3.5–5.2)
ALP SERPL-CCNC: 87 U/L (ref 55–135)
ALT SERPL W/O P-5'-P-CCNC: 21 U/L (ref 10–44)
ANION GAP SERPL CALC-SCNC: 8 MMOL/L (ref 8–16)
AST SERPL-CCNC: 29 U/L (ref 10–40)
BASOPHILS # BLD AUTO: 0.06 K/UL (ref 0–0.2)
BASOPHILS NFR BLD: 0.7 % (ref 0–1.9)
BILIRUB SERPL-MCNC: 0.7 MG/DL (ref 0.1–1)
BUN SERPL-MCNC: 25 MG/DL (ref 8–23)
CALCIUM SERPL-MCNC: 9.8 MG/DL (ref 8.7–10.5)
CHLORIDE SERPL-SCNC: 102 MMOL/L (ref 95–110)
CHOLEST SERPL-MCNC: 124 MG/DL (ref 120–199)
CHOLEST/HDLC SERPL: 3 {RATIO} (ref 2–5)
CO2 SERPL-SCNC: 31 MMOL/L (ref 23–29)
CREAT SERPL-MCNC: 1 MG/DL (ref 0.5–1.4)
DIFFERENTIAL METHOD: ABNORMAL
EOSINOPHIL # BLD AUTO: 0.2 K/UL (ref 0–0.5)
EOSINOPHIL NFR BLD: 2.7 % (ref 0–8)
ERYTHROCYTE [DISTWIDTH] IN BLOOD BY AUTOMATED COUNT: 12.4 % (ref 11.5–14.5)
EST. GFR  (AFRICAN AMERICAN): >60 ML/MIN/1.73 M^2
EST. GFR  (NON AFRICAN AMERICAN): >60 ML/MIN/1.73 M^2
GLUCOSE SERPL-MCNC: 111 MG/DL (ref 70–110)
HCT VFR BLD AUTO: 39.1 % (ref 40–54)
HDLC SERPL-MCNC: 41 MG/DL (ref 40–75)
HDLC SERPL: 33.1 % (ref 20–50)
HGB BLD-MCNC: 12.8 G/DL (ref 14–18)
IMM GRANULOCYTES # BLD AUTO: 0.03 K/UL (ref 0–0.04)
IMM GRANULOCYTES NFR BLD AUTO: 0.3 % (ref 0–0.5)
LDLC SERPL CALC-MCNC: 58 MG/DL (ref 63–159)
LYMPHOCYTES # BLD AUTO: 2.2 K/UL (ref 1–4.8)
LYMPHOCYTES NFR BLD: 25.9 % (ref 18–48)
MCH RBC QN AUTO: 34.3 PG (ref 27–31)
MCHC RBC AUTO-ENTMCNC: 32.7 G/DL (ref 32–36)
MCV RBC AUTO: 105 FL (ref 82–98)
MONOCYTES # BLD AUTO: 1.1 K/UL (ref 0.3–1)
MONOCYTES NFR BLD: 13.1 % (ref 4–15)
NEUTROPHILS # BLD AUTO: 4.9 K/UL (ref 1.8–7.7)
NEUTROPHILS NFR BLD: 57.3 % (ref 38–73)
NONHDLC SERPL-MCNC: 83 MG/DL
NRBC BLD-RTO: 0 /100 WBC
PLATELET # BLD AUTO: 239 K/UL (ref 150–350)
PMV BLD AUTO: 13.3 FL (ref 9.2–12.9)
POTASSIUM SERPL-SCNC: 4.8 MMOL/L (ref 3.5–5.1)
PROT SERPL-MCNC: 7.2 G/DL (ref 6–8.4)
RBC # BLD AUTO: 3.73 M/UL (ref 4.6–6.2)
SODIUM SERPL-SCNC: 141 MMOL/L (ref 136–145)
TRIGL SERPL-MCNC: 125 MG/DL (ref 30–150)
WBC # BLD AUTO: 8.58 K/UL (ref 3.9–12.7)

## 2021-03-12 PROCEDURE — 80061 LIPID PANEL: CPT | Performed by: INTERNAL MEDICINE

## 2021-03-12 PROCEDURE — 85025 COMPLETE CBC W/AUTO DIFF WBC: CPT | Performed by: INTERNAL MEDICINE

## 2021-03-12 PROCEDURE — 80053 COMPREHEN METABOLIC PANEL: CPT | Performed by: INTERNAL MEDICINE

## 2021-03-12 PROCEDURE — 36415 COLL VENOUS BLD VENIPUNCTURE: CPT | Mod: PO | Performed by: INTERNAL MEDICINE

## 2021-03-15 ENCOUNTER — TELEPHONE (OUTPATIENT)
Dept: FAMILY MEDICINE | Facility: CLINIC | Age: 86
End: 2021-03-15

## 2021-03-16 ENCOUNTER — LAB VISIT (OUTPATIENT)
Dept: LAB | Facility: HOSPITAL | Age: 86
End: 2021-03-16
Attending: FAMILY MEDICINE
Payer: MEDICARE

## 2021-03-16 ENCOUNTER — TELEPHONE (OUTPATIENT)
Dept: FAMILY MEDICINE | Facility: CLINIC | Age: 86
End: 2021-03-16

## 2021-03-16 DIAGNOSIS — D53.9 MACROCYTIC ANEMIA: ICD-10-CM

## 2021-03-16 LAB
FOLATE SERPL-MCNC: 13.6 NG/ML (ref 4–24)
T4 FREE SERPL-MCNC: 0.94 NG/DL (ref 0.71–1.51)
TSH SERPL DL<=0.005 MIU/L-ACNC: 0.5 UIU/ML (ref 0.4–4)
VIT B12 SERPL-MCNC: 411 PG/ML (ref 210–950)

## 2021-03-16 PROCEDURE — 82607 VITAMIN B-12: CPT | Performed by: FAMILY MEDICINE

## 2021-03-16 PROCEDURE — 83921 ORGANIC ACID SINGLE QUANT: CPT | Performed by: FAMILY MEDICINE

## 2021-03-16 PROCEDURE — 84439 ASSAY OF FREE THYROXINE: CPT | Performed by: FAMILY MEDICINE

## 2021-03-16 PROCEDURE — 82746 ASSAY OF FOLIC ACID SERUM: CPT | Performed by: FAMILY MEDICINE

## 2021-03-16 PROCEDURE — 36415 COLL VENOUS BLD VENIPUNCTURE: CPT | Mod: PO | Performed by: FAMILY MEDICINE

## 2021-03-16 PROCEDURE — 84443 ASSAY THYROID STIM HORMONE: CPT | Performed by: FAMILY MEDICINE

## 2021-03-18 ENCOUNTER — PATIENT MESSAGE (OUTPATIENT)
Dept: FAMILY MEDICINE | Facility: CLINIC | Age: 86
End: 2021-03-18

## 2021-03-18 DIAGNOSIS — E78.00 PURE HYPERCHOLESTEROLEMIA: ICD-10-CM

## 2021-03-18 DIAGNOSIS — D75.89 MACROCYTOSIS: Primary | ICD-10-CM

## 2021-03-22 LAB — METHYLMALONATE SERPL-SCNC: 0.37 UMOL/L

## 2021-05-23 RX ORDER — ONDANSETRON 2 MG/ML
INJECTION INTRAMUSCULAR; INTRAVENOUS
Status: DISCONTINUED
Start: 2021-05-23 | End: 2021-05-24 | Stop reason: HOSPADM

## 2021-05-24 ENCOUNTER — HOSPITAL ENCOUNTER (EMERGENCY)
Facility: HOSPITAL | Age: 86
Discharge: HOME | End: 2021-05-24
Attending: EMERGENCY MEDICINE
Payer: MEDICARE

## 2021-05-24 VITALS
OXYGEN SATURATION: 95 % | HEART RATE: 81 BPM | DIASTOLIC BLOOD PRESSURE: 86 MMHG | TEMPERATURE: 98 F | RESPIRATION RATE: 18 BRPM | SYSTOLIC BLOOD PRESSURE: 188 MMHG

## 2021-05-24 DIAGNOSIS — R11.0 NAUSEA: Primary | ICD-10-CM

## 2021-05-24 LAB
ALBUMIN SERPL BCP-MCNC: 3.7 G/DL (ref 3.5–5.2)
ALP SERPL-CCNC: 88 U/L (ref 55–135)
ALT SERPL W/O P-5'-P-CCNC: 20 U/L (ref 10–44)
ANION GAP SERPL CALC-SCNC: 12 MMOL/L (ref 8–16)
AST SERPL-CCNC: 26 U/L (ref 10–40)
BASOPHILS # BLD AUTO: 0.06 K/UL (ref 0–0.2)
BASOPHILS NFR BLD: 0.5 % (ref 0–1.9)
BILIRUB SERPL-MCNC: 0.7 MG/DL (ref 0.1–1)
BILIRUB UR QL STRIP: NEGATIVE
BUN SERPL-MCNC: 33 MG/DL (ref 8–23)
CALCIUM SERPL-MCNC: 10.1 MG/DL (ref 8.7–10.5)
CHLORIDE SERPL-SCNC: 100 MMOL/L (ref 95–110)
CLARITY UR REFRACT.AUTO: ABNORMAL
CO2 SERPL-SCNC: 23 MMOL/L (ref 23–29)
COLOR UR AUTO: YELLOW
CREAT SERPL-MCNC: 1.1 MG/DL (ref 0.5–1.4)
DIFFERENTIAL METHOD: ABNORMAL
EOSINOPHIL # BLD AUTO: 0.2 K/UL (ref 0–0.5)
EOSINOPHIL NFR BLD: 1.6 % (ref 0–8)
ERYTHROCYTE [DISTWIDTH] IN BLOOD BY AUTOMATED COUNT: 11.9 % (ref 11.5–14.5)
EST. GFR  (AFRICAN AMERICAN): >60 ML/MIN/1.73 M^2
EST. GFR  (NON AFRICAN AMERICAN): 59.6 ML/MIN/1.73 M^2
GLUCOSE SERPL-MCNC: 146 MG/DL (ref 70–110)
GLUCOSE UR QL STRIP: NEGATIVE
HCT VFR BLD AUTO: 38.2 % (ref 40–54)
HGB BLD-MCNC: 12.8 G/DL (ref 14–18)
HGB UR QL STRIP: NEGATIVE
IMM GRANULOCYTES # BLD AUTO: 0.06 K/UL (ref 0–0.04)
IMM GRANULOCYTES NFR BLD AUTO: 0.5 % (ref 0–0.5)
KETONES UR QL STRIP: NEGATIVE
LEUKOCYTE ESTERASE UR QL STRIP: NEGATIVE
LIPASE SERPL-CCNC: 31 U/L (ref 4–60)
LYMPHOCYTES # BLD AUTO: 2.1 K/UL (ref 1–4.8)
LYMPHOCYTES NFR BLD: 18.7 % (ref 18–48)
MCH RBC QN AUTO: 34.3 PG (ref 27–31)
MCHC RBC AUTO-ENTMCNC: 33.5 G/DL (ref 32–36)
MCV RBC AUTO: 102 FL (ref 82–98)
MONOCYTES # BLD AUTO: 1.1 K/UL (ref 0.3–1)
MONOCYTES NFR BLD: 9.9 % (ref 4–15)
NEUTROPHILS # BLD AUTO: 7.7 K/UL (ref 1.8–7.7)
NEUTROPHILS NFR BLD: 68.8 % (ref 38–73)
NITRITE UR QL STRIP: NEGATIVE
NRBC BLD-RTO: 0 /100 WBC
PH UR STRIP: 7 [PH] (ref 5–8)
PLATELET # BLD AUTO: 196 K/UL (ref 150–450)
PMV BLD AUTO: 10.6 FL (ref 9.2–12.9)
POTASSIUM SERPL-SCNC: 4.1 MMOL/L (ref 3.5–5.1)
PROT SERPL-MCNC: 6.8 G/DL (ref 6–8.4)
PROT UR QL STRIP: NEGATIVE
RBC # BLD AUTO: 3.73 M/UL (ref 4.6–6.2)
SODIUM SERPL-SCNC: 135 MMOL/L (ref 136–145)
SP GR UR STRIP: 1.01 (ref 1–1.03)
URN SPEC COLLECT METH UR: ABNORMAL
WBC # BLD AUTO: 11.27 K/UL (ref 3.9–12.7)

## 2021-05-24 PROCEDURE — 99284 EMERGENCY DEPT VISIT MOD MDM: CPT | Mod: ,,, | Performed by: PHYSICIAN ASSISTANT

## 2021-05-24 PROCEDURE — 99285 EMERGENCY DEPT VISIT HI MDM: CPT | Mod: 25

## 2021-05-24 PROCEDURE — 63600175 PHARM REV CODE 636 W HCPCS: Performed by: EMERGENCY MEDICINE

## 2021-05-24 PROCEDURE — 25000003 PHARM REV CODE 250: Performed by: PHYSICIAN ASSISTANT

## 2021-05-24 PROCEDURE — 81003 URINALYSIS AUTO W/O SCOPE: CPT | Performed by: PHYSICIAN ASSISTANT

## 2021-05-24 PROCEDURE — 80053 COMPREHEN METABOLIC PANEL: CPT | Performed by: PHYSICIAN ASSISTANT

## 2021-05-24 PROCEDURE — 96360 HYDRATION IV INFUSION INIT: CPT

## 2021-05-24 PROCEDURE — 63600175 PHARM REV CODE 636 W HCPCS: Performed by: PHYSICIAN ASSISTANT

## 2021-05-24 PROCEDURE — 96372 THER/PROPH/DIAG INJ SC/IM: CPT | Mod: 59

## 2021-05-24 PROCEDURE — 83690 ASSAY OF LIPASE: CPT | Performed by: PHYSICIAN ASSISTANT

## 2021-05-24 PROCEDURE — 25000003 PHARM REV CODE 250: Performed by: EMERGENCY MEDICINE

## 2021-05-24 PROCEDURE — 99284 PR EMERGENCY DEPT VISIT,LEVEL IV: ICD-10-PCS | Mod: ,,, | Performed by: PHYSICIAN ASSISTANT

## 2021-05-24 PROCEDURE — 85025 COMPLETE CBC W/AUTO DIFF WBC: CPT | Performed by: PHYSICIAN ASSISTANT

## 2021-05-24 RX ORDER — MECLIZINE HCL 12.5 MG 12.5 MG/1
25 TABLET ORAL
Status: COMPLETED | OUTPATIENT
Start: 2021-05-24 | End: 2021-05-24

## 2021-05-24 RX ORDER — MIDAZOLAM HYDROCHLORIDE 1 MG/ML
2 INJECTION INTRAMUSCULAR; INTRAVENOUS
Status: DISCONTINUED | OUTPATIENT
Start: 2021-05-24 | End: 2021-05-24

## 2021-05-24 RX ORDER — MAG HYDROX/ALUMINUM HYD/SIMETH 200-200-20
5 SUSPENSION, ORAL (FINAL DOSE FORM) ORAL
Status: COMPLETED | OUTPATIENT
Start: 2021-05-24 | End: 2021-05-24

## 2021-05-24 RX ORDER — DROPERIDOL 2.5 MG/ML
1.25 INJECTION, SOLUTION INTRAMUSCULAR; INTRAVENOUS
Status: COMPLETED | OUTPATIENT
Start: 2021-05-24 | End: 2021-05-24

## 2021-05-24 RX ORDER — ONDANSETRON 2 MG/ML
4 INJECTION INTRAMUSCULAR; INTRAVENOUS
Status: DISCONTINUED | OUTPATIENT
Start: 2021-05-24 | End: 2021-05-24

## 2021-05-24 RX ORDER — ONDANSETRON 4 MG/1
4 TABLET, FILM COATED ORAL EVERY 6 HOURS
Qty: 12 TABLET | Refills: 0 | Status: SHIPPED | OUTPATIENT
Start: 2021-05-24 | End: 2021-11-15

## 2021-05-24 RX ORDER — MECLIZINE HYDROCHLORIDE 25 MG/1
25 TABLET ORAL 3 TIMES DAILY PRN
Qty: 20 TABLET | Refills: 0 | Status: SHIPPED | OUTPATIENT
Start: 2021-05-24 | End: 2021-11-15

## 2021-05-24 RX ADMIN — MECLIZINE 25 MG: 12.5 TABLET ORAL at 06:05

## 2021-05-24 RX ADMIN — ALUMINUM HYDROXIDE, MAGNESIUM HYDROXIDE, AND SIMETHICONE 5 ML: 200; 200; 20 SUSPENSION ORAL at 02:05

## 2021-05-24 RX ADMIN — MECLIZINE 25 MG: 12.5 TABLET ORAL at 01:05

## 2021-05-24 RX ADMIN — SODIUM CHLORIDE, SODIUM LACTATE, POTASSIUM CHLORIDE, AND CALCIUM CHLORIDE 1000 ML: .6; .31; .03; .02 INJECTION, SOLUTION INTRAVENOUS at 12:05

## 2021-05-24 RX ADMIN — DROPERIDOL 1.25 MG: 2.5 INJECTION, SOLUTION INTRAMUSCULAR; INTRAVENOUS at 06:05

## 2021-06-04 ENCOUNTER — PES CALL (OUTPATIENT)
Dept: ADMINISTRATIVE | Facility: CLINIC | Age: 86
End: 2021-06-04

## 2021-07-26 ENCOUNTER — OFFICE VISIT (OUTPATIENT)
Dept: FAMILY MEDICINE | Facility: CLINIC | Age: 86
End: 2021-07-26
Payer: MEDICARE

## 2021-07-26 VITALS
OXYGEN SATURATION: 97 % | SYSTOLIC BLOOD PRESSURE: 120 MMHG | DIASTOLIC BLOOD PRESSURE: 60 MMHG | HEIGHT: 72 IN | TEMPERATURE: 98 F | WEIGHT: 215.63 LBS | HEART RATE: 65 BPM | BODY MASS INDEX: 29.21 KG/M2

## 2021-07-26 DIAGNOSIS — E78.00 PURE HYPERCHOLESTEROLEMIA: ICD-10-CM

## 2021-07-26 DIAGNOSIS — R73.09 ABNORMAL GLUCOSE: ICD-10-CM

## 2021-07-26 DIAGNOSIS — I10 ESSENTIAL HYPERTENSION: Primary | ICD-10-CM

## 2021-07-26 DIAGNOSIS — F13.20 BENZODIAZEPINE DEPENDENCE: ICD-10-CM

## 2021-07-26 DIAGNOSIS — D75.89 MACROCYTOSIS: ICD-10-CM

## 2021-07-26 PROCEDURE — 99999 PR PBB SHADOW E&M-EST. PATIENT-LVL IV: ICD-10-PCS | Mod: PBBFAC,,, | Performed by: INTERNAL MEDICINE

## 2021-07-26 PROCEDURE — 99213 PR OFFICE/OUTPT VISIT, EST, LEVL III, 20-29 MIN: ICD-10-PCS | Mod: S$PBB,,, | Performed by: INTERNAL MEDICINE

## 2021-07-26 PROCEDURE — 99999 PR PBB SHADOW E&M-EST. PATIENT-LVL IV: CPT | Mod: PBBFAC,,, | Performed by: INTERNAL MEDICINE

## 2021-07-26 PROCEDURE — 99213 OFFICE O/P EST LOW 20 MIN: CPT | Mod: S$PBB,,, | Performed by: INTERNAL MEDICINE

## 2021-07-26 PROCEDURE — 99214 OFFICE O/P EST MOD 30 MIN: CPT | Mod: PBBFAC,PO | Performed by: INTERNAL MEDICINE

## 2021-07-26 RX ORDER — CHOLECALCIFEROL (VITAMIN D3) 50 MCG
1 TABLET ORAL DAILY
COMMUNITY
Start: 2021-04-08

## 2021-08-02 ENCOUNTER — TELEPHONE (OUTPATIENT)
Dept: FAMILY MEDICINE | Facility: CLINIC | Age: 86
End: 2021-08-02

## 2021-08-03 ENCOUNTER — TELEPHONE (OUTPATIENT)
Dept: FAMILY MEDICINE | Facility: CLINIC | Age: 86
End: 2021-08-03

## 2021-08-03 DIAGNOSIS — S76.912A MUSCLE STRAIN OF LEFT THIGH, INITIAL ENCOUNTER: Primary | ICD-10-CM

## 2021-08-03 RX ORDER — MELOXICAM 7.5 MG/1
7.5 TABLET ORAL DAILY
Qty: 14 TABLET | Refills: 0 | Status: SHIPPED | OUTPATIENT
Start: 2021-08-03 | End: 2021-09-01

## 2021-08-17 ENCOUNTER — PES CALL (OUTPATIENT)
Dept: ADMINISTRATIVE | Facility: CLINIC | Age: 86
End: 2021-08-17

## 2021-09-01 DIAGNOSIS — E78.2 MIXED HYPERLIPIDEMIA: ICD-10-CM

## 2021-09-01 DIAGNOSIS — G47.00 INSOMNIA, UNSPECIFIED TYPE: ICD-10-CM

## 2021-09-01 DIAGNOSIS — I77.9 RIGHT-SIDED CAROTID ARTERY DISEASE, UNSPECIFIED TYPE: ICD-10-CM

## 2021-09-01 DIAGNOSIS — I25.10 CORONARY ARTERY DISEASE INVOLVING NATIVE CORONARY ARTERY WITHOUT ANGINA PECTORIS, UNSPECIFIED WHETHER NATIVE OR TRANSPLANTED HEART: ICD-10-CM

## 2021-09-01 DIAGNOSIS — L30.9 ECZEMA, UNSPECIFIED TYPE: ICD-10-CM

## 2021-09-01 DIAGNOSIS — I10 ESSENTIAL HYPERTENSION: ICD-10-CM

## 2021-09-01 DIAGNOSIS — S76.912A MUSCLE STRAIN OF LEFT THIGH, INITIAL ENCOUNTER: ICD-10-CM

## 2021-09-01 RX ORDER — MELOXICAM 7.5 MG/1
TABLET ORAL
Qty: 14 TABLET | Refills: 0 | Status: SHIPPED | OUTPATIENT
Start: 2021-09-01 | End: 2021-12-09

## 2021-09-01 RX ORDER — MELOXICAM 7.5 MG/1
TABLET ORAL
Qty: 14 TABLET | Refills: 0 | OUTPATIENT
Start: 2021-09-01

## 2021-09-02 RX ORDER — METOPROLOL SUCCINATE 100 MG/1
100 TABLET, EXTENDED RELEASE ORAL DAILY
Qty: 90 TABLET | Refills: 0 | Status: SHIPPED | OUTPATIENT
Start: 2021-09-02 | End: 2022-01-24 | Stop reason: SDUPTHER

## 2021-09-02 RX ORDER — LISINOPRIL 10 MG/1
10 TABLET ORAL DAILY
Qty: 90 TABLET | Refills: 0 | Status: SHIPPED | OUTPATIENT
Start: 2021-09-02 | End: 2022-01-24 | Stop reason: SDUPTHER

## 2021-09-02 RX ORDER — ATORVASTATIN CALCIUM 40 MG/1
40 TABLET, FILM COATED ORAL DAILY
Qty: 90 TABLET | Refills: 0 | Status: SHIPPED | OUTPATIENT
Start: 2021-09-02 | End: 2022-01-24 | Stop reason: SDUPTHER

## 2021-09-02 RX ORDER — FUROSEMIDE 20 MG/1
20 TABLET ORAL DAILY
Qty: 90 TABLET | Refills: 0 | Status: SHIPPED | OUTPATIENT
Start: 2021-09-02 | End: 2022-01-24 | Stop reason: SDUPTHER

## 2021-09-02 RX ORDER — LORAZEPAM 0.5 MG/1
TABLET ORAL
Qty: 30 TABLET | Refills: 0 | Status: SHIPPED | OUTPATIENT
Start: 2021-09-02 | End: 2021-10-18 | Stop reason: SDUPTHER

## 2021-09-02 RX ORDER — CLOTRIMAZOLE AND BETAMETHASONE DIPROPIONATE 10; .64 MG/G; MG/G
CREAM TOPICAL
Qty: 45 G | Refills: 0 | Status: SHIPPED | OUTPATIENT
Start: 2021-09-02 | End: 2022-08-05 | Stop reason: SDUPTHER

## 2021-10-18 DIAGNOSIS — G47.00 INSOMNIA, UNSPECIFIED TYPE: ICD-10-CM

## 2021-10-18 RX ORDER — LORAZEPAM 0.5 MG/1
TABLET ORAL
Qty: 30 TABLET | Refills: 0 | Status: SHIPPED | OUTPATIENT
Start: 2021-10-18 | End: 2021-11-15

## 2021-11-01 ENCOUNTER — TELEPHONE (OUTPATIENT)
Dept: DERMATOLOGY | Facility: CLINIC | Age: 86
End: 2021-11-01
Payer: MEDICARE

## 2021-11-15 ENCOUNTER — OFFICE VISIT (OUTPATIENT)
Dept: FAMILY MEDICINE | Facility: CLINIC | Age: 86
End: 2021-11-15
Payer: MEDICARE

## 2021-11-15 VITALS
DIASTOLIC BLOOD PRESSURE: 52 MMHG | TEMPERATURE: 98 F | WEIGHT: 205 LBS | BODY MASS INDEX: 27.77 KG/M2 | HEIGHT: 72 IN | HEART RATE: 59 BPM | SYSTOLIC BLOOD PRESSURE: 118 MMHG | OXYGEN SATURATION: 97 %

## 2021-11-15 DIAGNOSIS — I25.10 CORONARY ARTERY DISEASE INVOLVING NATIVE CORONARY ARTERY OF NATIVE HEART WITHOUT ANGINA PECTORIS: ICD-10-CM

## 2021-11-15 DIAGNOSIS — Z23 NEEDS FLU SHOT: ICD-10-CM

## 2021-11-15 DIAGNOSIS — R73.09 ABNORMAL GLUCOSE: ICD-10-CM

## 2021-11-15 DIAGNOSIS — R06.83 SNORING: ICD-10-CM

## 2021-11-15 DIAGNOSIS — R06.81 WITNESSED EPISODE OF APNEA: ICD-10-CM

## 2021-11-15 DIAGNOSIS — F13.20 BENZODIAZEPINE DEPENDENCE: ICD-10-CM

## 2021-11-15 DIAGNOSIS — E78.00 PURE HYPERCHOLESTEROLEMIA: ICD-10-CM

## 2021-11-15 DIAGNOSIS — D53.9 MACROCYTIC ANEMIA: ICD-10-CM

## 2021-11-15 DIAGNOSIS — G47.8 OTHER SLEEP DISORDERS: ICD-10-CM

## 2021-11-15 DIAGNOSIS — I10 ESSENTIAL HYPERTENSION: Primary | ICD-10-CM

## 2021-11-15 PROCEDURE — 99999 PR PBB SHADOW E&M-EST. PATIENT-LVL IV: ICD-10-PCS | Mod: PBBFAC,,, | Performed by: INTERNAL MEDICINE

## 2021-11-15 PROCEDURE — 99214 OFFICE O/P EST MOD 30 MIN: CPT | Mod: S$PBB,,, | Performed by: INTERNAL MEDICINE

## 2021-11-15 PROCEDURE — 99999 PR PBB SHADOW E&M-EST. PATIENT-LVL IV: CPT | Mod: PBBFAC,,, | Performed by: INTERNAL MEDICINE

## 2021-11-15 PROCEDURE — 99214 PR OFFICE/OUTPT VISIT, EST, LEVL IV, 30-39 MIN: ICD-10-PCS | Mod: S$PBB,,, | Performed by: INTERNAL MEDICINE

## 2021-11-15 PROCEDURE — G0008 ADMIN INFLUENZA VIRUS VAC: HCPCS | Mod: PBBFAC,PO

## 2021-11-15 PROCEDURE — 90694 VACC AIIV4 NO PRSRV 0.5ML IM: CPT | Mod: PBBFAC,PO

## 2021-11-15 PROCEDURE — 99214 OFFICE O/P EST MOD 30 MIN: CPT | Mod: PBBFAC,PO,25 | Performed by: INTERNAL MEDICINE

## 2021-11-18 ENCOUNTER — LAB VISIT (OUTPATIENT)
Dept: LAB | Facility: HOSPITAL | Age: 86
End: 2021-11-18
Attending: INTERNAL MEDICINE
Payer: MEDICARE

## 2021-11-18 DIAGNOSIS — D53.9 MACROCYTIC ANEMIA: ICD-10-CM

## 2021-11-18 LAB — FOLATE SERPL-MCNC: 17 NG/ML (ref 4–24)

## 2021-11-18 PROCEDURE — 36415 COLL VENOUS BLD VENIPUNCTURE: CPT | Mod: PO | Performed by: INTERNAL MEDICINE

## 2021-11-18 PROCEDURE — 82746 ASSAY OF FOLIC ACID SERUM: CPT | Performed by: INTERNAL MEDICINE

## 2021-11-19 ENCOUNTER — TELEPHONE (OUTPATIENT)
Dept: SLEEP MEDICINE | Facility: HOSPITAL | Age: 86
End: 2021-11-19
Payer: MEDICARE

## 2021-12-03 ENCOUNTER — PES CALL (OUTPATIENT)
Dept: ADMINISTRATIVE | Facility: CLINIC | Age: 86
End: 2021-12-03
Payer: MEDICARE

## 2021-12-09 ENCOUNTER — PES CALL (OUTPATIENT)
Dept: ADMINISTRATIVE | Facility: CLINIC | Age: 86
End: 2021-12-09
Payer: MEDICARE

## 2021-12-28 ENCOUNTER — TELEPHONE (OUTPATIENT)
Dept: FAMILY MEDICINE | Facility: CLINIC | Age: 86
End: 2021-12-28
Payer: MEDICARE

## 2022-01-20 ENCOUNTER — LAB VISIT (OUTPATIENT)
Dept: LAB | Facility: HOSPITAL | Age: 87
End: 2022-01-20
Attending: INTERNAL MEDICINE
Payer: MEDICARE

## 2022-01-20 DIAGNOSIS — E78.00 PURE HYPERCHOLESTEROLEMIA: ICD-10-CM

## 2022-01-20 DIAGNOSIS — D75.89 MACROCYTOSIS: ICD-10-CM

## 2022-01-20 DIAGNOSIS — R73.09 ABNORMAL GLUCOSE: ICD-10-CM

## 2022-01-20 LAB
ALBUMIN SERPL BCP-MCNC: 3.6 G/DL (ref 3.5–5.2)
ALP SERPL-CCNC: 92 U/L (ref 55–135)
ALT SERPL W/O P-5'-P-CCNC: 28 U/L (ref 10–44)
ANION GAP SERPL CALC-SCNC: 9 MMOL/L (ref 8–16)
AST SERPL-CCNC: 47 U/L (ref 10–40)
BASOPHILS # BLD AUTO: 0.05 K/UL (ref 0–0.2)
BASOPHILS NFR BLD: 0.7 % (ref 0–1.9)
BILIRUB SERPL-MCNC: 0.6 MG/DL (ref 0.1–1)
BUN SERPL-MCNC: 28 MG/DL (ref 8–23)
CALCIUM SERPL-MCNC: 10.2 MG/DL (ref 8.7–10.5)
CHLORIDE SERPL-SCNC: 103 MMOL/L (ref 95–110)
CHOLEST SERPL-MCNC: 121 MG/DL (ref 120–199)
CHOLEST/HDLC SERPL: 2.9 {RATIO} (ref 2–5)
CO2 SERPL-SCNC: 27 MMOL/L (ref 23–29)
CREAT SERPL-MCNC: 1.1 MG/DL (ref 0.5–1.4)
DIFFERENTIAL METHOD: ABNORMAL
EOSINOPHIL # BLD AUTO: 0.2 K/UL (ref 0–0.5)
EOSINOPHIL NFR BLD: 3 % (ref 0–8)
ERYTHROCYTE [DISTWIDTH] IN BLOOD BY AUTOMATED COUNT: 12.2 % (ref 11.5–14.5)
EST. GFR  (AFRICAN AMERICAN): >60 ML/MIN/1.73 M^2
EST. GFR  (NON AFRICAN AMERICAN): 59.2 ML/MIN/1.73 M^2
ESTIMATED AVG GLUCOSE: 117 MG/DL (ref 68–131)
GLUCOSE SERPL-MCNC: 111 MG/DL (ref 70–110)
HBA1C MFR BLD: 5.7 % (ref 4–5.6)
HCT VFR BLD AUTO: 37.6 % (ref 40–54)
HDLC SERPL-MCNC: 42 MG/DL (ref 40–75)
HDLC SERPL: 34.7 % (ref 20–50)
HGB BLD-MCNC: 12.3 G/DL (ref 14–18)
IMM GRANULOCYTES # BLD AUTO: 0.03 K/UL (ref 0–0.04)
IMM GRANULOCYTES NFR BLD AUTO: 0.4 % (ref 0–0.5)
LDLC SERPL CALC-MCNC: 49.4 MG/DL (ref 63–159)
LYMPHOCYTES # BLD AUTO: 2 K/UL (ref 1–4.8)
LYMPHOCYTES NFR BLD: 26.6 % (ref 18–48)
MCH RBC QN AUTO: 33.9 PG (ref 27–31)
MCHC RBC AUTO-ENTMCNC: 32.7 G/DL (ref 32–36)
MCV RBC AUTO: 104 FL (ref 82–98)
MONOCYTES # BLD AUTO: 0.9 K/UL (ref 0.3–1)
MONOCYTES NFR BLD: 12.2 % (ref 4–15)
NEUTROPHILS # BLD AUTO: 4.4 K/UL (ref 1.8–7.7)
NEUTROPHILS NFR BLD: 57.1 % (ref 38–73)
NONHDLC SERPL-MCNC: 79 MG/DL
NRBC BLD-RTO: 0 /100 WBC
PLATELET # BLD AUTO: 250 K/UL (ref 150–450)
PMV BLD AUTO: 12.7 FL (ref 9.2–12.9)
POTASSIUM SERPL-SCNC: 5 MMOL/L (ref 3.5–5.1)
PROT SERPL-MCNC: 6.8 G/DL (ref 6–8.4)
RBC # BLD AUTO: 3.63 M/UL (ref 4.6–6.2)
SODIUM SERPL-SCNC: 139 MMOL/L (ref 136–145)
TRIGL SERPL-MCNC: 148 MG/DL (ref 30–150)
WBC # BLD AUTO: 7.68 K/UL (ref 3.9–12.7)

## 2022-01-20 PROCEDURE — 85025 COMPLETE CBC W/AUTO DIFF WBC: CPT | Performed by: INTERNAL MEDICINE

## 2022-01-20 PROCEDURE — 36415 COLL VENOUS BLD VENIPUNCTURE: CPT | Mod: PO | Performed by: INTERNAL MEDICINE

## 2022-01-20 PROCEDURE — 83036 HEMOGLOBIN GLYCOSYLATED A1C: CPT | Performed by: INTERNAL MEDICINE

## 2022-01-20 PROCEDURE — 80053 COMPREHEN METABOLIC PANEL: CPT | Performed by: INTERNAL MEDICINE

## 2022-01-20 PROCEDURE — 80061 LIPID PANEL: CPT | Performed by: INTERNAL MEDICINE

## 2022-01-22 NOTE — PROGRESS NOTES
This note was created by combination of typed  and M-Modal dictation.  Transcription errors may be present.  If there are any questions, please contact me.    Assessment and Plan:   Coronary artery disease involving native coronary artery of native heart without angina pectoris  Mixed hyperlipidemia  Bilateral carotid artery stenosis  -pre visit labs good  Lost to f/u with cards  He would like to stay on the [facility]  Our staff to assist in arranging f/u  When he saw cards 6/2020, plan was echo, carotid US, did not get those done, was not aware that he was supposed to.  Refilled rx's to transfer to [facility]  -     atorvastatin (LIPITOR) 40 MG tablet; Take 1 tablet (40 mg total) by mouth once daily.  Dispense: 90 tablet; Refill: 3  -     metoprolol succinate (TOPROL-XL) 100 MG 24 hr tablet; Take 1 tablet (100 mg total) by mouth once daily.  Dispense: 90 tablet; Refill: 3    Essential hypertension  -stable, rx's printed for change to [facility]  -     furosemide (LASIX) 20 MG tablet; Take 1 tablet (20 mg total) by mouth once daily.  Dispense: 90 tablet; Refill: 3  -     lisinopriL 10 MG tablet; Take 1 tablet (10 mg total) by mouth once daily.  Dispense: 90 tablet; Refill: 3  -     metoprolol succinate (TOPROL-XL) 100 MG 24 hr tablet; Take 1 tablet (100 mg total) by mouth once daily.  Dispense: 90 tablet; Refill: 3    Chronic kidney disease, stage 3a  -CKD stage 2 vs 3a. Stable. monitor    Abnormal glucose  -pre visit a1c 5.7.  He'll work on cutting down on sweets.  -     Hemoglobin A1C; Future; Expected date: 07/23/2022  -     CBC Auto Differential; Future; Expected date: 07/23/2022    Macrocytic anemia  -denies EtOH excess; B12, folate, TSH WNL. Referral to heme. He wants to go to [facility] this.  -     Ambulatory referral/consult to Hematology / Oncology; Future; Expected date: 01/29/2022    Acute left-sided low back pain without sciatica  -more c/w with MSK not really with urinary. Hold on urine  testing.    Medications Discontinued During This Encounter   Medication Reason    metoprolol succinate (TOPROL-XL) 100 MG 24 hr tablet Reorder    lisinopriL 10 MG tablet Reorder    atorvastatin (LIPITOR) 40 MG tablet Reorder    furosemide (LASIX) 20 MG tablet Reorder       meds sent this encounter:  Medications Ordered This Encounter   Medications    atorvastatin (LIPITOR) 40 MG tablet     Sig: Take 1 tablet (40 mg total) by mouth once daily.     Dispense:  90 tablet     Refill:  3    furosemide (LASIX) 20 MG tablet     Sig: Take 1 tablet (20 mg total) by mouth once daily.     Dispense:  90 tablet     Refill:  3    lisinopriL 10 MG tablet     Sig: Take 1 tablet (10 mg total) by mouth once daily.     Dispense:  90 tablet     Refill:  3     .    metoprolol succinate (TOPROL-XL) 100 MG 24 hr tablet     Sig: Take 1 tablet (100 mg total) by mouth once daily.     Dispense:  90 tablet     Refill:  3     This prescription was filled on 12/8/2020. Any refills authorized will be placed on file.       Follow Up: No follow-ups on file.  OV 6 months with labs    Subjective:     Chief Complaint   Patient presents with    Hypertension    Back Pain     Left side around kidney area         HPI  Aleks is a 89 y.o. male, last appointment with this clinic was 11/15/2021.  Social History     Tobacco Use    Smoking status: Former Smoker    Smokeless tobacco: Never Used   Substance Use Topics    Alcohol use: Yes     Comment: Red wine daily      Social History     Occupational History    Occupation: 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  .  Six kids. [place] [employer] vet.  [facility] [place] [facility].         Social History     Social History Narrative     x 60 yr.   [facility] [place] [facility] [place].         Last visit with me in mid November  Hypertension, hyperlipidemia, abnormal glucose, checking labs  Snoring, ordered HST  Macrocytosis, if unchanged, refer to heme  Hx of BZD dependence, self DCd    Serum folate normal.  Done for complaints of  macrocytic anemia.  Denies alcohol use  Plan at office visit was repeat CBC and if remains macrocytic anemia without confirmed etiology then referred to hematology  CBC was not drawn  He has labs scheduled in January  Await those labs and if continued, would refer to Hematology    Pre visit labs  CBC macrocytic anemia  CMP CKD stage 2 vs 3a  ALT mild elevated  Lipid good  a1c 5.7 from 5.8    Refer to heme    Gets a recurrent pain in the left back area.   Has had this before. 3/10 intensity; he gets urine testing to r/o kidney involvement, usually negative. Pointing to the lower back.     Needs prescriptions changed from [facility].  Has had trouble with [facility] ever since [facility] closed.     [facility]  And also copy of labs    Has not seen cards x 6/2020  No chest pain no dyspnea  [facility].   Does not recall being instructed to get echo carotid us and LE dopplers at last cards visit 6/2020  He is willing to re-establish with cards  No dizziness or lightheadedness, no unilateral weakness      Patient Care Team:  Deshaun Goel MD as PCP - General (Internal Medicine)  Magnus Rashid MD as Consulting Physician (Dermatology)  iGllian Williamson MD as Consulting Physician (Dermatology)  Jae Weathers MA as Care Coordinator    Patient Active Problem List    Diagnosis Date Noted    Abnormal glucose 07/26/2021    AK (actinic keratosis) 09/24/2020 6/2020 efudex/dovonex bid x 5 days      ARGUETA (dyspnea on exertion) 06/15/2020    Leg swelling 06/15/2020    Benzodiazepine dependence 01/15/2020    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                           There is 40 - 49% left Internal Carotid stenosis.   July 2011      Pure hypercholesterolemia 11/06/2012       PAST MEDICAL PROBLEMS, PAST SURGICAL HISTORY: please see relevant portions of the electronic medical record    ALLERGIES AND MEDICATIONS: updated and reviewed.  Review of patient's allergies indicates:   Allergen Reactions    Iodine and iodide containing products Anaphylaxis    Adhesive     Dye Other (See Comments)    Iodinated contrast media     Trazodone      dizziness    Keflex [cephalexin] Diarrhea and Nausea And Vomiting    Penicillins Rash       Medication List with Changes/Refills   Current Medications    ASPIRIN (ECOTRIN) 81 MG EC TABLET    Take 81 mg by mouth once daily.     ATORVASTATIN (LIPITOR) 40 MG TABLET    Take 1 tablet (40 mg total) by mouth once daily.    CHOLECALCIFEROL, VITAMIN D3, (VITAMIN D3) 50 MCG (2,000 UNIT) TAB    Take 1 tablet by mouth once daily.    CLOTRIMAZOLE-BETAMETHASONE 1-0.05% (LOTRISONE) CREAM    apply topically to affected area 2 times daily as needed;  area    FUROSEMIDE (LASIX) 20 MG TABLET    Take 1 tablet (20 mg total) by mouth once daily.    LISINOPRIL 10 MG TABLET    Take 1 tablet (10 mg total) by mouth once daily.    MELOXICAM (MOBIC) 7.5 MG TABLET    TAKE 1 TABLET(7.5 MG) BY MOUTH EVERY DAY FOR 14 DAYS    METOPROLOL SUCCINATE (TOPROL-XL)  100 MG 24 HR TABLET    Take 1 tablet (100 mg total) by mouth once daily.    MULTIVITAMIN ORAL    Take 1 tablet by mouth once daily.    NITROGLYCERIN (NITROSTAT) 0.4 MG SL TABLET    Place 1 tablet under the tongue as needed.         Objective:   Physical Exam   Vitals:    01/24/22 1025   BP: 120/70   BP Location: Right arm   Patient Position: Sitting   BP Method: Medium (Manual)   Pulse: 68   Temp: 97.9 °F (36.6 °C)   TempSrc: Oral   SpO2: 97%   Weight: 95.7 kg (211 lb)   Height: 6' (1.829 m)    Body mass index is 28.62 kg/m².            Physical Exam  Constitutional:       General: He is not in acute distress.     Appearance: He is well-developed and well-nourished.   Eyes:      Extraocular Movements: EOM normal.   Neck:      Vascular: No carotid bruit.   Cardiovascular:      Rate and Rhythm: Normal rate and regular rhythm.      Heart sounds: Normal heart sounds. No murmur heard.      Pulmonary:      Effort: Pulmonary effort is normal.      Breath sounds: Normal breath sounds.   Musculoskeletal:         General: Normal range of motion.      Right lower leg: No edema.      Left lower leg: No edema.      Comments: Pointing to the left lower back around the SI joint area as the area of pain; left flank area nontender   Skin:     General: Skin is warm and dry.   Neurological:      Mental Status: He is alert and oriented to person, place, and time.      Coordination: Coordination normal.   Psychiatric:         Mood and Affect: Mood and affect normal.         Behavior: Behavior normal.         Thought Content: Thought content normal.       Component      Latest Ref Rng & Units 1/20/2022 11/18/2021 5/24/2021 3/16/2021   WBC      3.90 - 12.70 K/uL 7.68  11.27    RBC      4.60 - 6.20 M/uL 3.63 (L)  3.73 (L)    Hemoglobin      14.0 - 18.0 g/dL 12.3 (L)  12.8 (L)    Hematocrit      40.0 - 54.0 % 37.6 (L)  38.2 (L)    MCV      82 - 98 fL 104 (H)  102 (H)    MCH      27.0 - 31.0 pg 33.9 (H)  34.3 (H)    MCHC      32.0 - 36.0  g/dL 32.7  33.5    RDW      11.5 - 14.5 % 12.2  11.9    Platelets      150 - 450 K/uL 250  196    MPV      9.2 - 12.9 fL 12.7  10.6    Immature Granulocytes      0.0 - 0.5 % 0.4  0.5    Gran # (ANC)      1.8 - 7.7 K/uL 4.4  7.7    Immature Grans (Abs)      0.00 - 0.04 K/uL 0.03  0.06 (H)    Lymph #      1.0 - 4.8 K/uL 2.0  2.1    Mono #      0.3 - 1.0 K/uL 0.9  1.1 (H)    Eos #      0.0 - 0.5 K/uL 0.2  0.2    Baso #      0.00 - 0.20 K/uL 0.05  0.06    nRBC      0 /100 WBC 0  0    Gran %      38.0 - 73.0 % 57.1  68.8    Lymph %      18.0 - 48.0 % 26.6  18.7    Mono %      4.0 - 15.0 % 12.2  9.9    Eosinophil %      0.0 - 8.0 % 3.0  1.6    Basophil %      0.0 - 1.9 % 0.7  0.5    Differential Method       Automated  Automated    Sodium      136 - 145 mmol/L 139  135 (L)    Potassium      3.5 - 5.1 mmol/L 5.0  4.1    Chloride      95 - 110 mmol/L 103  100    CO2      23 - 29 mmol/L 27  23    Glucose      70 - 110 mg/dL 111 (H)  146 (H)    BUN      8 - 23 mg/dL 28 (H)  33 (H)    Creatinine      0.5 - 1.4 mg/dL 1.1  1.1    Calcium      8.7 - 10.5 mg/dL 10.2  10.1    PROTEIN TOTAL      6.0 - 8.4 g/dL 6.8  6.8    Albumin      3.5 - 5.2 g/dL 3.6  3.7    BILIRUBIN TOTAL      0.1 - 1.0 mg/dL 0.6  0.7    Alkaline Phosphatase      55 - 135 U/L 92  88    AST      10 - 40 U/L 47 (H)  26    ALT      10 - 44 U/L 28  20    Anion Gap      8 - 16 mmol/L 9  12    eGFR if African American      >60 mL/min/1.73 m:2 >60.0  >60.0    eGFR if non African American      >60 mL/min/1.73 m:2 59.2 (A)  59.6 (A)    Cholesterol      120 - 199 mg/dL 121      Triglycerides      30 - 150 mg/dL 148      HDL      40 - 75 mg/dL 42      LDL Cholesterol External      63.0 - 159.0 mg/dL 49.4 (L)      HDL/Cholesterol Ratio      20.0 - 50.0 % 34.7      Total Cholesterol/HDL Ratio      2.0 - 5.0 2.9      Non-HDL Cholesterol      mg/dL 79      Hemoglobin A1C External      4.0 - 5.6 % 5.7 (H)      Estimated Avg Glucose      68 - 131 mg/dL 117      Folate      4.0  - 24.0 ng/mL  17.0  13.6   Vitamin B-12      210 - 950 pg/mL    411   Methlymalonic Acid      <0.40 umol/L    0.37   TSH      0.400 - 4.000 uIU/mL    0.504   Free T4      0.71 - 1.51 ng/dL    0.94     Component      Latest Ref Rng & Units 3/12/2021   WBC      3.90 - 12.70 K/uL    RBC      4.60 - 6.20 M/uL    Hemoglobin      14.0 - 18.0 g/dL    Hematocrit      40.0 - 54.0 %    MCV      82 - 98 fL    MCH      27.0 - 31.0 pg    MCHC      32.0 - 36.0 g/dL    RDW      11.5 - 14.5 %    Platelets      150 - 450 K/uL    MPV      9.2 - 12.9 fL    Immature Granulocytes      0.0 - 0.5 %    Gran # (ANC)      1.8 - 7.7 K/uL    Immature Grans (Abs)      0.00 - 0.04 K/uL    Lymph #      1.0 - 4.8 K/uL    Mono #      0.3 - 1.0 K/uL    Eos #      0.0 - 0.5 K/uL    Baso #      0.00 - 0.20 K/uL    nRBC      0 /100 WBC    Gran %      38.0 - 73.0 %    Lymph %      18.0 - 48.0 %    Mono %      4.0 - 15.0 %    Eosinophil %      0.0 - 8.0 %    Basophil %      0.0 - 1.9 %    Differential Method          Sodium      136 - 145 mmol/L    Potassium      3.5 - 5.1 mmol/L    Chloride      95 - 110 mmol/L    CO2      23 - 29 mmol/L    Glucose      70 - 110 mg/dL    BUN      8 - 23 mg/dL    Creatinine      0.5 - 1.4 mg/dL    Calcium      8.7 - 10.5 mg/dL    PROTEIN TOTAL      6.0 - 8.4 g/dL    Albumin      3.5 - 5.2 g/dL    BILIRUBIN TOTAL      0.1 - 1.0 mg/dL    Alkaline Phosphatase      55 - 135 U/L    AST      10 - 40 U/L    ALT      10 - 44 U/L    Anion Gap      8 - 16 mmol/L    eGFR if African American      >60 mL/min/1.73 m:2    eGFR if non African American      >60 mL/min/1.73 m:2    Cholesterol      120 - 199 mg/dL 124   Triglycerides      30 - 150 mg/dL 125   HDL      40 - 75 mg/dL 41   LDL Cholesterol External      63.0 - 159.0 mg/dL 58.0 (L)   HDL/Cholesterol Ratio      20.0 - 50.0 % 33.1   Total Cholesterol/HDL Ratio      2.0 - 5.0 3.0   Non-HDL Cholesterol      mg/dL 83   Hemoglobin A1C External      4.0 - 5.6 %    Estimated Avg  Glucose      68 - 131 mg/dL    Folate      4.0 - 24.0 ng/mL    Vitamin B-12      210 - 950 pg/mL    Methlymalonic Acid      <0.40 umol/L    TSH      0.400 - 4.000 uIU/mL    Free T4      0.71 - 1.51 ng/dL

## 2022-01-24 ENCOUNTER — OFFICE VISIT (OUTPATIENT)
Dept: FAMILY MEDICINE | Facility: CLINIC | Age: 87
End: 2022-01-24
Payer: MEDICARE

## 2022-01-24 VITALS
HEART RATE: 68 BPM | SYSTOLIC BLOOD PRESSURE: 120 MMHG | BODY MASS INDEX: 28.58 KG/M2 | WEIGHT: 211 LBS | OXYGEN SATURATION: 97 % | HEIGHT: 72 IN | DIASTOLIC BLOOD PRESSURE: 70 MMHG | TEMPERATURE: 98 F

## 2022-01-24 DIAGNOSIS — M54.50 ACUTE LEFT-SIDED LOW BACK PAIN WITHOUT SCIATICA: ICD-10-CM

## 2022-01-24 DIAGNOSIS — N18.31 CHRONIC KIDNEY DISEASE, STAGE 3A: ICD-10-CM

## 2022-01-24 DIAGNOSIS — I10 ESSENTIAL HYPERTENSION: ICD-10-CM

## 2022-01-24 DIAGNOSIS — E78.2 MIXED HYPERLIPIDEMIA: ICD-10-CM

## 2022-01-24 DIAGNOSIS — I25.10 CORONARY ARTERY DISEASE INVOLVING NATIVE CORONARY ARTERY OF NATIVE HEART WITHOUT ANGINA PECTORIS: Primary | ICD-10-CM

## 2022-01-24 DIAGNOSIS — I65.23 BILATERAL CAROTID ARTERY STENOSIS: ICD-10-CM

## 2022-01-24 DIAGNOSIS — D53.9 MACROCYTIC ANEMIA: ICD-10-CM

## 2022-01-24 DIAGNOSIS — R73.09 ABNORMAL GLUCOSE: ICD-10-CM

## 2022-01-24 PROCEDURE — 99999 PR PBB SHADOW E&M-EST. PATIENT-LVL IV: CPT | Mod: PBBFAC,,, | Performed by: INTERNAL MEDICINE

## 2022-01-24 PROCEDURE — 99214 PR OFFICE/OUTPT VISIT, EST, LEVL IV, 30-39 MIN: ICD-10-PCS | Mod: S$PBB,,, | Performed by: INTERNAL MEDICINE

## 2022-01-24 PROCEDURE — 99999 PR PBB SHADOW E&M-EST. PATIENT-LVL IV: ICD-10-PCS | Mod: PBBFAC,,, | Performed by: INTERNAL MEDICINE

## 2022-01-24 PROCEDURE — 99214 OFFICE O/P EST MOD 30 MIN: CPT | Mod: S$PBB,,, | Performed by: INTERNAL MEDICINE

## 2022-01-24 PROCEDURE — 99214 OFFICE O/P EST MOD 30 MIN: CPT | Mod: PBBFAC,PO | Performed by: INTERNAL MEDICINE

## 2022-01-24 RX ORDER — METOPROLOL SUCCINATE 100 MG/1
100 TABLET, EXTENDED RELEASE ORAL DAILY
Qty: 90 TABLET | Refills: 3 | Status: SHIPPED | OUTPATIENT
Start: 2022-01-24 | End: 2023-02-06 | Stop reason: SDUPTHER

## 2022-01-24 RX ORDER — ATORVASTATIN CALCIUM 40 MG/1
40 TABLET, FILM COATED ORAL DAILY
Qty: 90 TABLET | Refills: 3 | Status: SHIPPED | OUTPATIENT
Start: 2022-01-24 | End: 2023-02-06 | Stop reason: SDUPTHER

## 2022-01-24 RX ORDER — FUROSEMIDE 20 MG/1
20 TABLET ORAL DAILY
Qty: 90 TABLET | Refills: 3 | Status: SHIPPED | OUTPATIENT
Start: 2022-01-24 | End: 2022-04-19

## 2022-01-24 RX ORDER — LISINOPRIL 10 MG/1
10 TABLET ORAL DAILY
Qty: 90 TABLET | Refills: 3 | Status: SHIPPED | OUTPATIENT
Start: 2022-01-24 | End: 2023-02-06 | Stop reason: SDUPTHER

## 2022-01-28 ENCOUNTER — OFFICE VISIT (OUTPATIENT)
Dept: HEMATOLOGY/ONCOLOGY | Facility: CLINIC | Age: 87
End: 2022-01-28
Payer: MEDICARE

## 2022-01-28 VITALS
OXYGEN SATURATION: 97 % | DIASTOLIC BLOOD PRESSURE: 63 MMHG | HEART RATE: 75 BPM | SYSTOLIC BLOOD PRESSURE: 135 MMHG | BODY MASS INDEX: 27.5 KG/M2 | WEIGHT: 203 LBS | HEIGHT: 72 IN

## 2022-01-28 DIAGNOSIS — Z95.1 POSTSURGICAL AORTOCORONARY BYPASS STATUS: ICD-10-CM

## 2022-01-28 DIAGNOSIS — D53.9 MACROCYTIC ANEMIA: Primary | ICD-10-CM

## 2022-01-28 DIAGNOSIS — I10 ESSENTIAL HYPERTENSION: ICD-10-CM

## 2022-01-28 PROCEDURE — 99205 PR OFFICE/OUTPT VISIT, NEW, LEVL V, 60-74 MIN: ICD-10-PCS | Mod: S$PBB,,, | Performed by: STUDENT IN AN ORGANIZED HEALTH CARE EDUCATION/TRAINING PROGRAM

## 2022-01-28 PROCEDURE — 99213 OFFICE O/P EST LOW 20 MIN: CPT | Mod: PBBFAC | Performed by: STUDENT IN AN ORGANIZED HEALTH CARE EDUCATION/TRAINING PROGRAM

## 2022-01-28 PROCEDURE — 99205 OFFICE O/P NEW HI 60 MIN: CPT | Mod: S$PBB,,, | Performed by: STUDENT IN AN ORGANIZED HEALTH CARE EDUCATION/TRAINING PROGRAM

## 2022-01-28 PROCEDURE — 99999 PR PBB SHADOW E&M-EST. PATIENT-LVL III: ICD-10-PCS | Mod: PBBFAC,,, | Performed by: STUDENT IN AN ORGANIZED HEALTH CARE EDUCATION/TRAINING PROGRAM

## 2022-01-28 PROCEDURE — 99999 PR PBB SHADOW E&M-EST. PATIENT-LVL III: CPT | Mod: PBBFAC,,, | Performed by: STUDENT IN AN ORGANIZED HEALTH CARE EDUCATION/TRAINING PROGRAM

## 2022-01-28 NOTE — Clinical Note
He would like labs in [facility] where Dr. Goel works.  Labs : copper, LDH, path interp, retic, TSH. These labs can be done at his convenience. Thanks -e

## 2022-01-28 NOTE — ASSESSMENT & PLAN NOTE
Macrocytosis worsened between 2012 and 2019 but otherwise has been stable since.  Slight downtrend in anemia in the past year.  History significant for daily wine until last year and currently drinks wine once a week.  Alcohol use can cause marrow suppression (anemia) and he did have mild transaminitis suggestive of possible underlying chronic liver disease, which might explain the macrocytosis.  Patient also went on a diet and lost about 20 lbs last year, and the past 3 months his weight has been stable.  B12, folate, and TSH all WNL; vitamin B12 level on the lower limit of normal, MMA WNL. Has mild renal insufficiency, which doesn't explain the macrocytosis, but is a likely contributor to downtrending hemoglobin in the absence of occult blood loss or other reversible causes.  Differentials also include MDS but RDW WNL and no other cytopenias making this much less likely.  -evaluate for reversible causes  -check smear; even if slightly abnormal would hold off on bone marrow biopsy at this time since unlikely to recommend intervention if diagnosed.  -trial of vitamin b12 supplementation to see if this helps with macrocytosis.  -follow up with me as needed. For surveillance for his anemia, cbc annually is sufficient.

## 2022-01-29 NOTE — PROGRESS NOTES
PATIENT: Aleks Brown  MRN: [id number]  DATE: 1/29/2022      Diagnosis:   1. Macrocytic anemia    2. Essential hypertension    3. Postsurgical aortocoronary bypass status        Chief Complaint: Anemia      Oncologic History:   Oncologic History    Oncologic History      Oncologic Treatment      Pathology          Subjective:    Interval History: Mr. Brown is here for follow up.     89 year old man with coronary artery disease and hypertension is referred her for evaluation of macrocytic anemia.    He reports he feels well and is in his usual state of health.  Denies fevers, chills, new chest pain or shortness of breath, abdominal pain, nausea, vomiting, diarrhea, or constipation. Denies epistaxis, hemoptysis, hematemesis, hematochezia, melena, or hematuria.   He has had intentional weight loss in 2021 and lost about 20 lbs in 1 year.  For the past 3 months his weight has been stable and he reports his most recent weight on his home scale was 203 lbs.  He used to drink wine daily but has cut down to a glass of wine a week although he admits it is a  decent sized pour.    His daughter accompanies him at this visit.    Past Medical History:   Past Medical History:   Diagnosis Date    Allergy     seasonal    Arthritis     Basal cell carcinoma 10 years    right ear     Basal cell carcinoma 08/24/2017    Left earlobe     Basal cell carcinoma 11/14/2020    Left wrist    Basal cell carcinoma 05/18/2020    left nasal tip    CAD (coronary artery disease) 11/29/2012    Hyperlipidemia     Hypertension     Joint pain     PTSD (post-traumatic stress disorder)     Squamous cell carcinoma 06/26/2017    Squamous cell carcinoma of skin 10/2019    right cheek (cryosurgery)        Past Surgical HIstory:   Past Surgical History:   Procedure Laterality Date    CATARACT EXTRACTION      CORONARY ARTERY BYPASS GRAFT         Family History:   Family History   Problem Relation Age of Onset    Skin cancer Mother      Leukemia Father     Diabetes Sister     Peripheral vascular disease Sister     Cancer Brother     No Known Problems Daughter     No Known Problems Daughter     No Known Problems Daughter     No Known Problems Daughter     No Known Problems Son     No Known Problems Son     Melanoma Neg Hx        Social History:  reports that he has quit smoking. He has never used smokeless tobacco. He reports current alcohol use. He reports that he does not use drugs.    Allergies:  Review of patient's allergies indicates:   Allergen Reactions    Iodine and iodide containing products Anaphylaxis    Adhesive     Dye Other (See Comments)    Iodinated contrast media     Trazodone      dizziness    Keflex [cephalexin] Diarrhea and Nausea And Vomiting    Penicillins Rash       Medications:  Current Outpatient Medications   Medication Sig Dispense Refill    aspirin (ECOTRIN) 81 MG EC tablet Take 81 mg by mouth once daily.       atorvastatin (LIPITOR) 40 MG tablet Take 1 tablet (40 mg total) by mouth once daily. 90 tablet 3    cholecalciferol, vitamin D3, (VITAMIN D3) 50 mcg (2,000 unit) Tab Take 1 tablet by mouth once daily.      clotrimazole-betamethasone 1-0.05% (LOTRISONE) cream apply topically to affected area 2 times daily as needed;  area 45 g 0    furosemide (LASIX) 20 MG tablet Take 1 tablet (20 mg total) by mouth once daily. 90 tablet 3    lisinopriL 10 MG tablet Take 1 tablet (10 mg total) by mouth once daily. 90 tablet 3    metoprolol succinate (TOPROL-XL) 100 MG 24 hr tablet Take 1 tablet (100 mg total) by mouth once daily. 90 tablet 3    MULTIVITAMIN ORAL Take 1 tablet by mouth once daily.      nitroGLYCERIN (NITROSTAT) 0.4 MG SL tablet Place 1 tablet under the tongue as needed.       meloxicam (MOBIC) 7.5 MG tablet TAKE 1 TABLET(7.5 MG) BY MOUTH EVERY DAY FOR 14 DAYS (Patient not taking: No sig reported) 14 tablet 0     No current facility-administered medications for this visit.       Review of  Systems   Constitutional: Negative for activity change, appetite change, chills, diaphoresis, fatigue, fever and unexpected weight change.   HENT: Negative for nosebleeds and trouble swallowing.    Eyes: Negative for visual disturbance.   Respiratory: Negative for cough, chest tightness, shortness of breath and wheezing.    Cardiovascular: Negative for chest pain and leg swelling.   Gastrointestinal: Negative for abdominal distention, abdominal pain, blood in stool, constipation, diarrhea, nausea and vomiting.   Endocrine: Negative for cold intolerance and heat intolerance.   Genitourinary: Negative for difficulty urinating and dysuria.   Musculoskeletal: Negative for arthralgias and back pain.   Skin: Negative for color change.   Neurological: Negative for dizziness, weakness, light-headedness, numbness and headaches.   Hematological: Negative for adenopathy. Does not bruise/bleed easily.   Psychiatric/Behavioral: Negative for confusion.       ECOG Performance Status:     ECOG SCORE    1 - Restricted in strenuous activity-ambulatory and able to carry out work of a light nature         Objective:      Vitals:   Vitals:    01/28/22 1336   BP: 135/63   BP Location: Left arm   Patient Position: Sitting   BP Method: Large (Automatic)   Pulse: 75   SpO2: 97%   Weight: 92.1 kg (203 lb)   Height: 6' (1.829 m)     BMI: Body mass index is 27.53 kg/m².    Physical Exam  Constitutional:       General: He is not in acute distress.     Appearance: Normal appearance. He is not ill-appearing.   HENT:      Head: Normocephalic and atraumatic.      Mouth/Throat:      Pharynx: No oropharyngeal exudate or posterior oropharyngeal erythema.   Eyes:      General: No scleral icterus.     Extraocular Movements: Extraocular movements intact.      Conjunctiva/sclera: Conjunctivae normal.      Pupils: Pupils are equal, round, and reactive to light.   Cardiovascular:      Rate and Rhythm: Normal rate and regular rhythm.      Heart sounds: No  murmur heard.  No friction rub. No gallop.    Pulmonary:      Effort: Pulmonary effort is normal. No respiratory distress.      Breath sounds: No stridor. No wheezing, rhonchi or rales.   Abdominal:      General: Bowel sounds are normal. There is no distension.      Palpations: Abdomen is soft. There is no mass.      Tenderness: There is no abdominal tenderness. There is no guarding or rebound.   Musculoskeletal:         General: Normal range of motion.      Cervical back: Normal range of motion and neck supple.      Right lower leg: No edema.      Left lower leg: No edema.   Skin:     General: Skin is warm and dry.   Neurological:      General: No focal deficit present.      Mental Status: He is alert.         Laboratory Data:   Results for ADEOLA VALDEZ (MRN [id number]) as of 1/29/2022 10:41   Ref. Range 1/20/2022 08:26   WBC Latest Ref Range: 3.90 - 12.70 K/uL 7.68   RBC Latest Ref Range: 4.60 - 6.20 M/uL 3.63 (L)   Hemoglobin Latest Ref Range: 14.0 - 18.0 g/dL 12.3 (L)   Hematocrit Latest Ref Range: 40.0 - 54.0 % 37.6 (L)   MCV Latest Ref Range: 82 - 98 fL 104 (H)   MCH Latest Ref Range: 27.0 - 31.0 pg 33.9 (H)   MCHC Latest Ref Range: 32.0 - 36.0 g/dL 32.7   RDW Latest Ref Range: 11.5 - 14.5 % 12.2   Platelets Latest Ref Range: 150 - 450 K/uL 250   MPV Latest Ref Range: 9.2 - 12.9 fL 12.7   Gran % Latest Ref Range: 38.0 - 73.0 % 57.1   Lymph % Latest Ref Range: 18.0 - 48.0 % 26.6   Mono % Latest Ref Range: 4.0 - 15.0 % 12.2   Eosinophil % Latest Ref Range: 0.0 - 8.0 % 3.0   Basophil % Latest Ref Range: 0.0 - 1.9 % 0.7   Immature Granulocytes Latest Ref Range: 0.0 - 0.5 % 0.4   Gran # (ANC) Latest Ref Range: 1.8 - 7.7 K/uL 4.4   Lymph # Latest Ref Range: 1.0 - 4.8 K/uL 2.0   Mono # Latest Ref Range: 0.3 - 1.0 K/uL 0.9   Eos # Latest Ref Range: 0.0 - 0.5 K/uL 0.2   Baso # Latest Ref Range: 0.00 - 0.20 K/uL 0.05   Immature Grans (Abs) Latest Ref Range: 0.00 - 0.04 K/uL 0.03   nRBC Latest Ref Range: 0 /100 WBC 0    Differential Method Unknown Automated   Sodium Latest Ref Range: 136 - 145 mmol/L 139   Potassium Latest Ref Range: 3.5 - 5.1 mmol/L 5.0   Chloride Latest Ref Range: 95 - 110 mmol/L 103   CO2 Latest Ref Range: 23 - 29 mmol/L 27   Anion Gap Latest Ref Range: 8 - 16 mmol/L 9   BUN Latest Ref Range: 8 - 23 mg/dL 28 (H)   Creatinine Latest Ref Range: 0.5 - 1.4 mg/dL 1.1   eGFR if non African American Latest Ref Range: >60 mL/min/1.73 m^2 59.2 (A)   eGFR if African American Latest Ref Range: >60 mL/min/1.73 m^2 >60.0   Glucose Latest Ref Range: 70 - 110 mg/dL 111 (H)   Calcium Latest Ref Range: 8.7 - 10.5 mg/dL 10.2   Alkaline Phosphatase Latest Ref Range: 55 - 135 U/L 92   PROTEIN TOTAL Latest Ref Range: 6.0 - 8.4 g/dL 6.8   Albumin Latest Ref Range: 3.5 - 5.2 g/dL 3.6   BILIRUBIN TOTAL Latest Ref Range: 0.1 - 1.0 mg/dL 0.6   AST Latest Ref Range: 10 - 40 U/L 47 (H)   ALT Latest Ref Range: 10 - 44 U/L 28   Triglycerides Latest Ref Range: 30 - 150 mg/dL 148   Cholesterol Latest Ref Range: 120 - 199 mg/dL 121   HDL Latest Ref Range: 40 - 75 mg/dL 42   HDL/Cholesterol Ratio Latest Ref Range: 20.0 - 50.0 % 34.7   LDL Cholesterol External Latest Ref Range: 63.0 - 159.0 mg/dL 49.4 (L)   Non-HDL Cholesterol Latest Units: mg/dL 79   Total Cholesterol/HDL Ratio Latest Ref Range: 2.0 - 5.0  2.9   Hemoglobin A1C External Latest Ref Range: 4.0 - 5.6 % 5.7 (H)   Estimated Avg Glucose Latest Ref Range: 68 - 131 mg/dL 117       Imaging:     No results found.    Assessment:       1. Macrocytic anemia    2. Essential hypertension    3. Postsurgical aortocoronary bypass status           Plan:       Problem List Items Addressed This Visit        Cardiac/Vascular    Postsurgical aortocoronary bypass status    Overview     CABG x 5 (1988 SVG to OM1 (sequential graft), SVG to OM2 (sequential         graft), LIMA to LAD (single graft), SVG to PDA (single graft), HUY to       D1 (single graft))          Current Assessment & Plan      Asymptomatic. Physical exam benign.  -continue with medical management.         Essential hypertension    Current Assessment & Plan     BP adequately controlled  -continue with medical management            Oncology    Macrocytic anemia - Primary    Current Assessment & Plan     Macrocytosis worsened between 2012 and 2019 but otherwise has been stable since.  Slight downtrend in anemia in the past year.  History significant for daily wine until last year and currently drinks wine once a week.  Alcohol use can cause marrow suppression (anemia) and he did have mild transaminitis suggestive of possible underlying chronic liver disease, which might explain the macrocytosis.  Patient also went on a diet and lost about 20 lbs last year, and the past 3 months his weight has been stable.  B12, folate, and TSH all WNL; vitamin B12 level on the lower limit of normal, MMA WNL. Has mild renal insufficiency, which doesn't explain the macrocytosis, but is a likely contributor to downtrending hemoglobin in the absence of occult blood loss or other reversible causes.  Differentials also include MDS but RDW WNL and no other cytopenias making this much less likely.  -evaluate for reversible causes  -check smear; even if slightly abnormal would hold off on bone marrow biopsy at this time since unlikely to recommend intervention if diagnosed.  -trial of vitamin b12 supplementation to see if this helps with macrocytosis.  -follow up with me as needed. For surveillance for his anemia, cbc annually is sufficient.         Relevant Orders    Copper, Serum    Pathologist Interpretation Differential    TSH    Lactate Dehydrogenase    Reticulocytes          Orders Placed This Encounter   Procedures    Copper, Serum    Pathologist Interpretation Differential    TSH    Lactate Dehydrogenase    Reticulocytes           Aaron Reynolds MD  Hematology Oncology

## 2022-01-31 ENCOUNTER — LAB VISIT (OUTPATIENT)
Dept: LAB | Facility: HOSPITAL | Age: 87
End: 2022-01-31
Attending: STUDENT IN AN ORGANIZED HEALTH CARE EDUCATION/TRAINING PROGRAM
Payer: MEDICARE

## 2022-01-31 DIAGNOSIS — D53.9 MACROCYTIC ANEMIA: ICD-10-CM

## 2022-01-31 LAB
BASOPHILS # BLD AUTO: 0.05 K/UL (ref 0–0.2)
BASOPHILS NFR BLD: 0.6 % (ref 0–1.9)
DIFFERENTIAL METHOD: ABNORMAL
EOSINOPHIL # BLD AUTO: 0.3 K/UL (ref 0–0.5)
EOSINOPHIL NFR BLD: 2.9 % (ref 0–8)
ERYTHROCYTE [DISTWIDTH] IN BLOOD BY AUTOMATED COUNT: 12.4 % (ref 11.5–14.5)
HCT VFR BLD AUTO: 38.7 % (ref 40–54)
HGB BLD-MCNC: 12.8 G/DL (ref 14–18)
IMM GRANULOCYTES # BLD AUTO: 0.02 K/UL (ref 0–0.04)
IMM GRANULOCYTES NFR BLD AUTO: 0.2 % (ref 0–0.5)
LDH SERPL L TO P-CCNC: 206 U/L (ref 110–260)
LYMPHOCYTES # BLD AUTO: 1.8 K/UL (ref 1–4.8)
LYMPHOCYTES NFR BLD: 20.5 % (ref 18–48)
MCH RBC QN AUTO: 34.2 PG (ref 27–31)
MCHC RBC AUTO-ENTMCNC: 33.1 G/DL (ref 32–36)
MCV RBC AUTO: 104 FL (ref 82–98)
MONOCYTES # BLD AUTO: 0.9 K/UL (ref 0.3–1)
MONOCYTES NFR BLD: 9.9 % (ref 4–15)
NEUTROPHILS # BLD AUTO: 5.8 K/UL (ref 1.8–7.7)
NEUTROPHILS NFR BLD: 65.9 % (ref 38–73)
NRBC BLD-RTO: 0 /100 WBC
PATH REV BLD -IMP: NORMAL
PATH REV BLD -IMP: NORMAL
PLATELET # BLD AUTO: 267 K/UL (ref 150–450)
PMV BLD AUTO: 12.8 FL (ref 9.2–12.9)
RBC # BLD AUTO: 3.74 M/UL (ref 4.6–6.2)
RETICS/RBC NFR AUTO: 1.6 % (ref 0.4–2)
TSH SERPL DL<=0.005 MIU/L-ACNC: 1.08 UIU/ML (ref 0.4–4)
WBC # BLD AUTO: 8.75 K/UL (ref 3.9–12.7)

## 2022-01-31 PROCEDURE — 83615 LACTATE (LD) (LDH) ENZYME: CPT | Performed by: STUDENT IN AN ORGANIZED HEALTH CARE EDUCATION/TRAINING PROGRAM

## 2022-01-31 PROCEDURE — 85025 COMPLETE CBC W/AUTO DIFF WBC: CPT | Performed by: STUDENT IN AN ORGANIZED HEALTH CARE EDUCATION/TRAINING PROGRAM

## 2022-01-31 PROCEDURE — 85045 AUTOMATED RETICULOCYTE COUNT: CPT | Performed by: STUDENT IN AN ORGANIZED HEALTH CARE EDUCATION/TRAINING PROGRAM

## 2022-01-31 PROCEDURE — 36415 COLL VENOUS BLD VENIPUNCTURE: CPT | Mod: PO | Performed by: STUDENT IN AN ORGANIZED HEALTH CARE EDUCATION/TRAINING PROGRAM

## 2022-01-31 PROCEDURE — 84443 ASSAY THYROID STIM HORMONE: CPT | Performed by: STUDENT IN AN ORGANIZED HEALTH CARE EDUCATION/TRAINING PROGRAM

## 2022-01-31 PROCEDURE — 82525 ASSAY OF COPPER: CPT | Performed by: STUDENT IN AN ORGANIZED HEALTH CARE EDUCATION/TRAINING PROGRAM

## 2022-01-31 PROCEDURE — 85060 BLOOD SMEAR INTERPRETATION: CPT | Mod: ,,, | Performed by: PATHOLOGY

## 2022-01-31 PROCEDURE — 85060 PATHOLOGIST REVIEW: ICD-10-PCS | Mod: ,,, | Performed by: PATHOLOGY

## 2022-02-01 ENCOUNTER — OFFICE VISIT (OUTPATIENT)
Dept: DERMATOLOGY | Facility: CLINIC | Age: 87
End: 2022-02-01
Payer: MEDICARE

## 2022-02-01 DIAGNOSIS — Z85.828 HISTORY OF NONMELANOMA SKIN CANCER: ICD-10-CM

## 2022-02-01 DIAGNOSIS — L57.0 AK (ACTINIC KERATOSIS): Primary | ICD-10-CM

## 2022-02-01 DIAGNOSIS — L82.1 SK (SEBORRHEIC KERATOSIS): ICD-10-CM

## 2022-02-01 DIAGNOSIS — R23.8 VENOUS LAKE OF LIP: ICD-10-CM

## 2022-02-01 PROCEDURE — 17003 DESTRUCT PREMALG LES 2-14: CPT | Mod: S$PBB,,, | Performed by: DERMATOLOGY

## 2022-02-01 PROCEDURE — 17000 DESTRUCT PREMALG LESION: CPT | Mod: PBBFAC | Performed by: DERMATOLOGY

## 2022-02-01 PROCEDURE — 99213 OFFICE O/P EST LOW 20 MIN: CPT | Mod: 25,S$PBB,, | Performed by: DERMATOLOGY

## 2022-02-01 PROCEDURE — 17000 DESTRUCT PREMALG LESION: CPT | Mod: S$PBB,,, | Performed by: DERMATOLOGY

## 2022-02-01 PROCEDURE — 99213 PR OFFICE/OUTPT VISIT, EST, LEVL III, 20-29 MIN: ICD-10-PCS | Mod: 25,S$PBB,, | Performed by: DERMATOLOGY

## 2022-02-01 PROCEDURE — 17000 PR DESTRUCTION(LASER SURGERY,CRYOSURGERY,CHEMOSURGERY),PREMALIGNANT LESIONS,FIRST LESION: ICD-10-PCS | Mod: S$PBB,,, | Performed by: DERMATOLOGY

## 2022-02-01 PROCEDURE — 99999 PR PBB SHADOW E&M-EST. PATIENT-LVL III: ICD-10-PCS | Mod: PBBFAC,,, | Performed by: DERMATOLOGY

## 2022-02-01 PROCEDURE — 99999 PR PBB SHADOW E&M-EST. PATIENT-LVL III: CPT | Mod: PBBFAC,,, | Performed by: DERMATOLOGY

## 2022-02-01 PROCEDURE — 17003 DESTRUCT PREMALG LES 2-14: CPT | Mod: PBBFAC | Performed by: DERMATOLOGY

## 2022-02-01 PROCEDURE — 17003 DESTRUCTION, PREMALIGNANT LESIONS; SECOND THROUGH 14 LESIONS: ICD-10-PCS | Mod: S$PBB,,, | Performed by: DERMATOLOGY

## 2022-02-01 PROCEDURE — 99213 OFFICE O/P EST LOW 20 MIN: CPT | Mod: PBBFAC | Performed by: DERMATOLOGY

## 2022-02-01 NOTE — PATIENT INSTRUCTIONS

## 2022-02-01 NOTE — ASSESSMENT & PLAN NOTE
Today's Plan:      Cryosurgery Procedure Note    Verbal consent from the patient is obtained including, but not limited to, risk of hypopigmentation/hyperpigmentation, scar, recurrence of lesion. The patient is aware of the precancerous quality and need for treatment of these lesions. Liquid nitrogen cryosurgery is applied to the 6 actinic keratoses, as detailed in the physical exam, to produce a freeze injury. The patient is aware that blisters may form and is instructed on wound care with gentle cleansing and use of vaseline ointment to keep moist until healed. The patient is supplied a handout on cryosurgery and is instructed to call if lesions do not completely resolve.    Cont wear hat always

## 2022-02-01 NOTE — PROGRESS NOTES
Subjective:       Patient ID:  Aleks Brown is a 89 y.o. male who presents for   Chief Complaint   Patient presents with    Skin Check     Ubse      History of Present Illness: The patient presents for follow up of skin check.    The patient was last seen on: 9/24/2020 for cryosurgery to actinic keratoses which have resolved and bx of invasive SCC - right dorsal foot mohs 10/13/20    Other skin complaints: none  This is a high risk patient here to check for the development of new lesions.           Review of Systems   Skin: Positive for activity-related sunscreen use and wears hat (always). Negative for daily sunscreen use and recent sunburn.   Hematologic/Lymphatic: Does not bruise/bleed easily.        Objective:    Physical Exam   Constitutional: He appears well-developed and well-nourished. No distress.   Neurological: He is alert and oriented to person, place, and time. He is not disoriented.   Psychiatric: He has a normal mood and affect.   Skin:   Areas Examined (abnormalities noted in diagram):   Scalp / Hair Palpated and Inspected  Head / Face Inspection Performed  Neck Inspection Performed  Chest / Axilla Inspection Performed  Back Inspection Performed  RUE Inspected  LUE Inspection Performed                       Diagram Legend     Erythematous scaling macule/papule c/w actinic keratosis       Vascular papule c/w angioma      Pigmented verrucoid papule/plaque c/w seborrheic keratosis      Yellow umbilicated papule c/w sebaceous hyperplasia      Irregularly shaped tan macule c/w lentigo     1-2 mm smooth white papules consistent with Milia      Movable subcutaneous cyst with punctum c/w epidermal inclusion cyst      Subcutaneous movable cyst c/w pilar cyst      Firm pink to brown papule c/w dermatofibroma      Pedunculated fleshy papule(s) c/w skin tag(s)      Evenly pigmented macule c/w junctional nevus     Mildly variegated pigmented, slightly irregular-bordered macule c/w mildly atypical nevus       Flesh colored to evenly pigmented papule c/w intradermal nevus       Pink pearly papule/plaque c/w basal cell carcinoma      Erythematous hyperkeratotic cursted plaque c/w SCC      Surgical scar with no sign of skin cancer recurrence      Open and closed comedones      Inflammatory papules and pustules      Verrucoid papule consistent consistent with wart     Erythematous eczematous patches and plaques     Dystrophic onycholytic nail with subungual debris c/w onychomycosis     Umbilicated papule    Erythematous-base heme-crusted tan verrucoid plaque consistent with inflamed seborrheic keratosis     Erythematous Silvery Scaling Plaque c/w Psoriasis     See annotation      Assessment / Plan:          SK (seborrheic keratosis)  These are benign inherited growths without a malignant potential. Reassurance given to patient. No treatment is necessary.       History of nonmelanoma skin cancer  Area(s) of previous NMSC evaluated with no signs of recurrence.    Upper body skin examination performed today including at least 6 points as noted in physical examination. No lesions suspicious for malignancy noted.    Recommend daily sun protection/avoidance and use of at least SPF 30, broad spectrum sunscreen (OTC drug).       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   Reassurance given to patient. No treatment necessary.         AK (actinic keratosis)  Today's Plan:      Cryosurgery Procedure Note    Verbal consent from the patient is obtained including, but not limited to, risk of hypopigmentation/hyperpigmentation, scar, recurrence of lesion. The patient is aware of the precancerous quality and need for treatment of these lesions. Liquid nitrogen cryosurgery is applied to the 6 actinic keratoses, as detailed in the physical exam, to produce a freeze injury. The patient is aware that blisters may form and is instructed on wound care with gentle cleansing and use of vaseline ointment to keep moist until healed. The  patient is supplied a handout on cryosurgery and is instructed to call if lesions do not completely resolve.    Cont wear hat always        Follow up in about 1 year (around 2/1/2023).

## 2022-02-02 LAB — COPPER SERPL-MCNC: 1190 UG/L (ref 665–1480)

## 2022-02-03 ENCOUNTER — TELEPHONE (OUTPATIENT)
Dept: HEMATOLOGY/ONCOLOGY | Facility: CLINIC | Age: 87
End: 2022-02-03
Payer: MEDICARE

## 2022-02-03 NOTE — TELEPHONE ENCOUNTER
Reviewed results with patient.  No cause of his macrocytic anemia identified.  No further workup recommended.  He may follow up with me as needed.    He is agreeable with this plan.    Aaron Reynolds MD  Hematology Oncology

## 2022-02-10 ENCOUNTER — OFFICE VISIT (OUTPATIENT)
Dept: CARDIOLOGY | Facility: CLINIC | Age: 87
End: 2022-02-10
Payer: MEDICARE

## 2022-02-10 VITALS
HEIGHT: 72 IN | HEART RATE: 53 BPM | BODY MASS INDEX: 27.5 KG/M2 | WEIGHT: 203 LBS | DIASTOLIC BLOOD PRESSURE: 57 MMHG | SYSTOLIC BLOOD PRESSURE: 149 MMHG

## 2022-02-10 DIAGNOSIS — I25.10 CORONARY ARTERY DISEASE INVOLVING NATIVE CORONARY ARTERY OF NATIVE HEART WITHOUT ANGINA PECTORIS: Primary | ICD-10-CM

## 2022-02-10 DIAGNOSIS — M79.89 LEG SWELLING: ICD-10-CM

## 2022-02-10 DIAGNOSIS — I10 ESSENTIAL HYPERTENSION: ICD-10-CM

## 2022-02-10 DIAGNOSIS — R06.09 DOE (DYSPNEA ON EXERTION): ICD-10-CM

## 2022-02-10 DIAGNOSIS — E78.00 PURE HYPERCHOLESTEROLEMIA: ICD-10-CM

## 2022-02-10 DIAGNOSIS — I65.23 BILATERAL CAROTID ARTERY STENOSIS: ICD-10-CM

## 2022-02-10 PROCEDURE — 99214 OFFICE O/P EST MOD 30 MIN: CPT | Mod: S$PBB,,, | Performed by: INTERNAL MEDICINE

## 2022-02-10 PROCEDURE — 93010 ELECTROCARDIOGRAM REPORT: CPT | Mod: S$PBB,,, | Performed by: INTERNAL MEDICINE

## 2022-02-10 PROCEDURE — 99999 PR PBB SHADOW E&M-EST. PATIENT-LVL III: CPT | Mod: PBBFAC,,, | Performed by: INTERNAL MEDICINE

## 2022-02-10 PROCEDURE — 99213 OFFICE O/P EST LOW 20 MIN: CPT | Mod: PBBFAC | Performed by: INTERNAL MEDICINE

## 2022-02-10 PROCEDURE — 99214 PR OFFICE/OUTPT VISIT, EST, LEVL IV, 30-39 MIN: ICD-10-PCS | Mod: S$PBB,,, | Performed by: INTERNAL MEDICINE

## 2022-02-10 PROCEDURE — 93005 ELECTROCARDIOGRAM TRACING: CPT | Mod: PBBFAC | Performed by: INTERNAL MEDICINE

## 2022-02-10 PROCEDURE — 93010 EKG 12-LEAD: ICD-10-PCS | Mod: S$PBB,,, | Performed by: INTERNAL MEDICINE

## 2022-02-10 PROCEDURE — 99999 PR PBB SHADOW E&M-EST. PATIENT-LVL III: ICD-10-PCS | Mod: PBBFAC,,, | Performed by: INTERNAL MEDICINE

## 2022-02-10 NOTE — PROGRESS NOTES
Subjective:    Patient ID:  Aleks Brown is a 89 y.o. male who presents for follow-up of No chief complaint on file.      HPI     CAD/CABG x 5 1988([facility] 2011 with 4/5 grafts patent), HTN, HLD, COPD, carotid disease     Previously saw Dr Velarde - last 10/2016     Mr. Brown presents for follow-up without any complaints and is feeling    well. He has no interval medical problems since last seen by me.      He has shoulder discomfort and sob if he walks too fast - same as 5 yrs ago when had cath - see below.  When he paces himself, he can walk 10 miles without discomfort.  These are described as bilateral shoulder pain and some dyspnea.    Both short-lived.      Can do vigorous activities without provoking sx.    The pattern is totally unchanged.      Mr. Brown has no symptoms of chf. He has no symptoms of dysrhythmia.    He has never had syncope. He has no claudication.    I did review the findings of carotid u/s    Labs now look great      CHRONIC CONDITIONS:    - ANGINA PECTORIS NEC&NOS (stable) - sporadic    - COR AS NATIVE VESSEL    - HYPERLIPIDEMIA NEC & NOS - ldl not at goal 1-12    - HYPERTENSION NOS - currently controlled    - AORTOCORONARY BYPASS    - Cereb Art Occl S Infarct      ACTIVE PROBLEM LIST:    - S/P unstable angina 02/12/2011    - S/P CABG x 5 1988;    - hypertension    - dyslipidemia    - Carotid stenosis    - COPD    - obese      Cath 2-11 showed 4/5 grafts patent - this was for the sx of shoulder discomfort and sob.     CONCLUSIONS 11-12 - this was basically unchanged in 2015  There is 60 - 69% right Internal Carotid stenosis.  There is 40 - 49% left Internal Carotid stenosis.  Bilateral ECA stenosis.      3/16/18 Very  and active for his age  Denies CP or SOB  EKG NSR - ok  Cardiac stable  Currently not interested in further cardiac testing unless absolutely necessary  OV 6 months    2/10/22 Denies CP or SOB  EKG NSR o k  BP controlled by home readings    Review of Systems    Constitutional: Negative for decreased appetite.   HENT: Negative for ear discharge.    Eyes: Negative for blurred vision.   Endocrine: Negative for polyphagia.   Skin: Negative for nail changes.   Genitourinary: Negative for bladder incontinence.   Neurological: Negative for aphonia.   Psychiatric/Behavioral: Negative for hallucinations.   Allergic/Immunologic: Negative for hives.        Objective:    Physical Exam  Constitutional:       Appearance: He is well-developed and well-nourished.   HENT:      Head: Normocephalic and atraumatic.   Eyes:      Conjunctiva/sclera: Conjunctivae normal.      Pupils: Pupils are equal, round, and reactive to light.   Cardiovascular:      Rate and Rhythm: Normal rate.      Pulses: Intact distal pulses.      Heart sounds: Normal heart sounds.   Pulmonary:      Effort: Pulmonary effort is normal.      Breath sounds: Normal breath sounds.   Abdominal:      General: Bowel sounds are normal.      Palpations: Abdomen is soft.   Musculoskeletal:         General: Normal range of motion.      Cervical back: Normal range of motion and neck supple.   Skin:     General: Skin is warm and dry.   Neurological:      Mental Status: He is alert and oriented to person, place, and time.           Assessment:       1. Coronary artery disease involving native coronary artery of native heart without angina pectoris    2. Essential hypertension    3. Pure hypercholesterolemia    4. Bilateral carotid artery stenosis    5. ARGUETA (dyspnea on exertion)    6. Leg swelling         Plan:       Currently not interested in further cardiac testing unless absolutely necessary  Continue Rx for CAD, HTN, HLD, carotid disease

## 2022-04-19 DIAGNOSIS — I10 ESSENTIAL HYPERTENSION: ICD-10-CM

## 2022-04-19 RX ORDER — FUROSEMIDE 20 MG/1
TABLET ORAL
Qty: 90 TABLET | Refills: 3 | Status: SHIPPED | OUTPATIENT
Start: 2022-04-19 | End: 2022-10-28 | Stop reason: SDUPTHER

## 2022-04-19 NOTE — TELEPHONE ENCOUNTER
Refill Routing Note   Medication(s) are not appropriate for processing by [facility] for the following reason(s):      - Required vitals are abnormal    [facility] action(s):  Defer Medication-related problems identified: Requires labs     Medication Therapy Plan: last order printed  Medication reconciliation completed: No     Appointments  past 12m or future 3m with PCP    Date Provider   Last Visit   1/24/2022 Deshaun Goel MD   Next Visit   7/25/2022 Deshaun Goel MD   ED visits in past 90 days: 0        Note composed:4:27 PM 04/19/2022

## 2022-04-19 NOTE — TELEPHONE ENCOUNTER
No new care gaps identified.  Powered by [employer]. Reference number: [id number].   4/19/2022 1:32:28 PM CDT

## 2022-07-06 ENCOUNTER — TELEPHONE (OUTPATIENT)
Dept: SLEEP MEDICINE | Facility: HOSPITAL | Age: 87
End: 2022-07-06
Payer: MEDICARE

## 2022-07-06 ENCOUNTER — TELEPHONE (OUTPATIENT)
Dept: PULMONOLOGY | Facility: HOSPITAL | Age: 87
End: 2022-07-06
Payer: MEDICARE

## 2022-07-15 ENCOUNTER — HOSPITAL ENCOUNTER (OUTPATIENT)
Dept: SLEEP MEDICINE | Facility: HOSPITAL | Age: 87
Discharge: HOME | End: 2022-07-15
Attending: INTERNAL MEDICINE
Payer: MEDICARE

## 2022-07-15 DIAGNOSIS — G47.8 OTHER SLEEP DISORDERS: ICD-10-CM

## 2022-07-15 DIAGNOSIS — R06.83 SNORING: ICD-10-CM

## 2022-07-15 DIAGNOSIS — R06.81 WITNESSED EPISODE OF APNEA: ICD-10-CM

## 2022-07-15 PROCEDURE — 95800 SLP STDY UNATTENDED: CPT

## 2022-07-15 PROCEDURE — 95806 SLEEP STUDY UNATT&RESP EFFT: CPT | Mod: 26,52,, | Performed by: INTERNAL MEDICINE

## 2022-07-15 PROCEDURE — 95806 PR SLEEP STUDY, UNATTENDED, SIMUL RECORD HR/O2 SAT/RESP FLOW/RESP EFFT: ICD-10-PCS | Mod: 26,52,, | Performed by: INTERNAL MEDICINE

## 2022-07-15 NOTE — PROGRESS NOTES
The patient ID was verified. He was instructed on how to turn the Home Sleep Testing device on and off, how to apply the sensors. He  was encouraged to sleep on supine position and must have 6 hours of sleep. The patient was instructed not to get the device wet and return it back to us. All questions were answered prior to patient leaving. He  was provided the after visit summary.

## 2022-07-18 ENCOUNTER — TELEPHONE (OUTPATIENT)
Dept: SLEEP MEDICINE | Facility: HOSPITAL | Age: 87
End: 2022-07-18
Payer: MEDICARE

## 2022-07-18 ENCOUNTER — LAB VISIT (OUTPATIENT)
Dept: LAB | Facility: HOSPITAL | Age: 87
End: 2022-07-18
Attending: INTERNAL MEDICINE
Payer: MEDICARE

## 2022-07-18 DIAGNOSIS — I25.10 CORONARY ARTERY DISEASE INVOLVING NATIVE CORONARY ARTERY OF NATIVE HEART WITHOUT ANGINA PECTORIS: ICD-10-CM

## 2022-07-18 DIAGNOSIS — E78.00 PURE HYPERCHOLESTEROLEMIA: ICD-10-CM

## 2022-07-18 DIAGNOSIS — R73.09 ABNORMAL GLUCOSE: ICD-10-CM

## 2022-07-18 LAB
ALBUMIN SERPL BCP-MCNC: 3.9 G/DL (ref 3.5–5.2)
ALP SERPL-CCNC: 87 U/L (ref 55–135)
ALT SERPL W/O P-5'-P-CCNC: 20 U/L (ref 10–44)
ANION GAP SERPL CALC-SCNC: 8 MMOL/L (ref 8–16)
AST SERPL-CCNC: 26 U/L (ref 10–40)
BASOPHILS # BLD AUTO: 0.05 K/UL (ref 0–0.2)
BASOPHILS NFR BLD: 0.6 % (ref 0–1.9)
BILIRUB SERPL-MCNC: 0.9 MG/DL (ref 0.1–1)
BUN SERPL-MCNC: 22 MG/DL (ref 8–23)
CALCIUM SERPL-MCNC: 10 MG/DL (ref 8.7–10.5)
CHLORIDE SERPL-SCNC: 99 MMOL/L (ref 95–110)
CHOLEST SERPL-MCNC: 116 MG/DL (ref 120–199)
CHOLEST/HDLC SERPL: 3.3 {RATIO} (ref 2–5)
CO2 SERPL-SCNC: 28 MMOL/L (ref 23–29)
CREAT SERPL-MCNC: 1.1 MG/DL (ref 0.5–1.4)
DIFFERENTIAL METHOD: ABNORMAL
EOSINOPHIL # BLD AUTO: 0.2 K/UL (ref 0–0.5)
EOSINOPHIL NFR BLD: 2.9 % (ref 0–8)
ERYTHROCYTE [DISTWIDTH] IN BLOOD BY AUTOMATED COUNT: 12.1 % (ref 11.5–14.5)
EST. GFR  (AFRICAN AMERICAN): >60 ML/MIN/1.73 M^2
EST. GFR  (NON AFRICAN AMERICAN): 59.2 ML/MIN/1.73 M^2
ESTIMATED AVG GLUCOSE: 120 MG/DL (ref 68–131)
GLUCOSE SERPL-MCNC: 106 MG/DL (ref 70–110)
HBA1C MFR BLD: 5.8 % (ref 4–5.6)
HCT VFR BLD AUTO: 38.9 % (ref 40–54)
HDLC SERPL-MCNC: 35 MG/DL (ref 40–75)
HDLC SERPL: 30.2 % (ref 20–50)
HGB BLD-MCNC: 12.5 G/DL (ref 14–18)
IMM GRANULOCYTES # BLD AUTO: 0.02 K/UL (ref 0–0.04)
IMM GRANULOCYTES NFR BLD AUTO: 0.3 % (ref 0–0.5)
LDLC SERPL CALC-MCNC: 55 MG/DL (ref 63–159)
LYMPHOCYTES # BLD AUTO: 2.1 K/UL (ref 1–4.8)
LYMPHOCYTES NFR BLD: 25.8 % (ref 18–48)
MCH RBC QN AUTO: 33.6 PG (ref 27–31)
MCHC RBC AUTO-ENTMCNC: 32.1 G/DL (ref 32–36)
MCV RBC AUTO: 105 FL (ref 82–98)
MONOCYTES # BLD AUTO: 0.9 K/UL (ref 0.3–1)
MONOCYTES NFR BLD: 11.8 % (ref 4–15)
NEUTROPHILS # BLD AUTO: 4.7 K/UL (ref 1.8–7.7)
NEUTROPHILS NFR BLD: 58.6 % (ref 38–73)
NONHDLC SERPL-MCNC: 81 MG/DL
NRBC BLD-RTO: 0 /100 WBC
PLATELET # BLD AUTO: 255 K/UL (ref 150–450)
PMV BLD AUTO: 13.5 FL (ref 9.2–12.9)
POTASSIUM SERPL-SCNC: 4.9 MMOL/L (ref 3.5–5.1)
PROT SERPL-MCNC: 7 G/DL (ref 6–8.4)
RBC # BLD AUTO: 3.72 M/UL (ref 4.6–6.2)
SODIUM SERPL-SCNC: 135 MMOL/L (ref 136–145)
TRIGL SERPL-MCNC: 130 MG/DL (ref 30–150)
WBC # BLD AUTO: 7.95 K/UL (ref 3.9–12.7)

## 2022-07-18 PROCEDURE — 36415 COLL VENOUS BLD VENIPUNCTURE: CPT | Mod: PO | Performed by: INTERNAL MEDICINE

## 2022-07-18 PROCEDURE — 85025 COMPLETE CBC W/AUTO DIFF WBC: CPT | Performed by: INTERNAL MEDICINE

## 2022-07-18 PROCEDURE — 83036 HEMOGLOBIN GLYCOSYLATED A1C: CPT | Performed by: INTERNAL MEDICINE

## 2022-07-18 PROCEDURE — 80061 LIPID PANEL: CPT | Performed by: INTERNAL MEDICINE

## 2022-07-18 PROCEDURE — 80053 COMPREHEN METABOLIC PANEL: CPT | Performed by: INTERNAL MEDICINE

## 2022-07-19 NOTE — PROGRESS NOTES
The HSAT device was received and uploaded this morning. Ian was returned back late due to illness.The recorded sleep data noted to be adequate and the patient did not report issue with the device during the study.

## 2022-07-22 NOTE — PROCEDURES
"Dear Provider,     You have ordered sleep LAB services to perform the sleep study for Aleks Brown.  The sleep study that you ordered is complete.      Please find Sleep Study result in "Chart Review" under the "Media tab."      As the ordering provider, you are responsible for reviewing the results and implementing a treatment plan with your patient.    If you need a Sleep Medicine provider to explain the sleep study findings and arrange treatment for the patient, please refer patient for consultation to our Sleep Clinic via [facility] with Ambulatory Consult Sleep.    To do that please place an order for an  "Ambulatory Consult Sleep" - it will go to our clinic work queue for our Medical Assistant to contact the patient for an appointment.     For any questions, please contact our clinic staff at [phone] to talk to clinical staff.   "

## 2022-08-05 ENCOUNTER — OFFICE VISIT (OUTPATIENT)
Dept: FAMILY MEDICINE | Facility: CLINIC | Age: 87
End: 2022-08-05
Payer: MEDICARE

## 2022-08-05 VITALS
TEMPERATURE: 98 F | SYSTOLIC BLOOD PRESSURE: 120 MMHG | DIASTOLIC BLOOD PRESSURE: 78 MMHG | BODY MASS INDEX: 27.27 KG/M2 | OXYGEN SATURATION: 99 % | HEIGHT: 72 IN | WEIGHT: 201.38 LBS | HEART RATE: 67 BPM

## 2022-08-05 DIAGNOSIS — R73.09 ABNORMAL GLUCOSE: ICD-10-CM

## 2022-08-05 DIAGNOSIS — D53.9 MACROCYTIC ANEMIA: ICD-10-CM

## 2022-08-05 DIAGNOSIS — I10 ESSENTIAL HYPERTENSION: Primary | ICD-10-CM

## 2022-08-05 DIAGNOSIS — F13.20 BENZODIAZEPINE DEPENDENCE: ICD-10-CM

## 2022-08-05 DIAGNOSIS — E78.00 PURE HYPERCHOLESTEROLEMIA: ICD-10-CM

## 2022-08-05 DIAGNOSIS — L30.9 ECZEMA, UNSPECIFIED TYPE: ICD-10-CM

## 2022-08-05 DIAGNOSIS — G47.33 OBSTRUCTIVE SLEEP APNEA: ICD-10-CM

## 2022-08-05 PROCEDURE — 99214 OFFICE O/P EST MOD 30 MIN: CPT | Mod: S$PBB,,, | Performed by: INTERNAL MEDICINE

## 2022-08-05 PROCEDURE — 99214 PR OFFICE/OUTPT VISIT, EST, LEVL IV, 30-39 MIN: ICD-10-PCS | Mod: S$PBB,,, | Performed by: INTERNAL MEDICINE

## 2022-08-05 PROCEDURE — 99999 PR PBB SHADOW E&M-EST. PATIENT-LVL IV: CPT | Mod: PBBFAC,,, | Performed by: INTERNAL MEDICINE

## 2022-08-05 PROCEDURE — 99999 PR PBB SHADOW E&M-EST. PATIENT-LVL IV: ICD-10-PCS | Mod: PBBFAC,,, | Performed by: INTERNAL MEDICINE

## 2022-08-05 PROCEDURE — 99214 OFFICE O/P EST MOD 30 MIN: CPT | Mod: PBBFAC,PO | Performed by: INTERNAL MEDICINE

## 2022-08-05 RX ORDER — CLOTRIMAZOLE AND BETAMETHASONE DIPROPIONATE 10; .64 MG/G; MG/G
CREAM TOPICAL
Qty: 45 G | Refills: 0 | Status: SHIPPED | OUTPATIENT
Start: 2022-08-05 | End: 2022-12-12

## 2022-08-05 NOTE — PROGRESS NOTES
This note was created by combination of typed  and M-Modal dictation.  Transcription errors may be present.  If there are any questions, please contact me.    Assessment and Plan:   Essential hypertension  -stable no changes.  -     Comprehensive Metabolic Panel; Future; Expected date: 02/05/2023  -     CBC Auto Differential; Future; Expected date: 02/05/2023    Abnormal glucose  -stable.  Continue to monitor. Not on meds  -     Hemoglobin A1C; Future; Expected date: 02/05/2023    Pure hypercholesterolemia  -pre visit labs good on statin. No changes. Saw cards.  Did not want tests unless absolutely necessary.    Macrocytic anemia  -stopped alcohol about 4-5 months ago by his estimate  -     CBC Auto Differential; Future; Expected date: 02/05/2023    Obstructive sleep apnea 7/18/22 HST with mod NATTY AHI 20  -is agreeable to try cpap with nasal pillow.  Has some nasal congestion improved with otc sinus pill  Trial autopap  Instructed to bring in machine with next visit to review use hours, AHI etc.  -     CPAP FOR HOME USE    Eczema, unspecified type  -in the  and on the leg area.  Prn use of lotrisone.   -     clotrimazole-betamethasone 1-0.05% (LOTRISONE) cream; apply topically to affected area 2 times daily as needed;  area  Dispense: 45 g; Refill: 0    Benzodiazepine dependence  -off of lorazepam.  Stable off of it.    Medications Discontinued During This Encounter   Medication Reason    meloxicam (MOBIC) 7.5 MG tablet     clotrimazole-betamethasone 1-0.05% (LOTRISONE) cream Reorder       meds sent this encounter:  Medications Ordered This Encounter   Medications    clotrimazole-betamethasone 1-0.05% (LOTRISONE) cream     Sig: apply topically to affected area 2 times daily as needed;  area     Dispense:  45 g     Refill:  0       Follow Up: No follow-ups on file. OV 6 months with labs. Bring in autopap to review use    Subjective:     Chief Complaint   Patient presents with    Follow-up        FELICIA Fink is a 89 y.o. male, last appointment with this clinic was Visit date not found.  Social History     Tobacco Use    Smoking status: Former Smoker    Smokeless tobacco: Never Used   Substance Use Topics    Alcohol use: Yes     Comment: Red wine daily      Social History     Occupational History    Occupation: 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  .  Six kids. [place] [employer] vet.  [employer] [place].         Social History     Social History Narrative     x 60 yr.   [employer] [place].         Last visit 1/2022  CAD, carotid disease, lipid, re-est with cards. Saw cards. Pt did not want testing unless absolutely necessary. No changes.   HTN stable  CKD stage 3a stable  Abn glc, work on TLC  Left low back pain at that time.  Macrocytosis. Folate normal. Denies EtOH. Referred to heme. Saw heme.  Actually drank more than I was aware of. Plan to try empiric B12. Check smear. Hold on BMBx  7/15/2022  HST with mod NATTY AHI 20    Pre visit labs  CBC macrocytic anemia stable.  CMP with CKD stage 3a  Lipid good  a1c 5.8 from 5.7    Had what he thinks is a viral illness about 4 days ago  With nausea and diarrhea  Overall improved.    Still with chronic foot pain from frostbite from service in [place]    No chest pain no dyspnea.      Will be turning 89 in November.     Needs RF on lortisone for eczema in the  area but also on the leg.  Gets red and itchy.    Reviewed lab results. Stable  Stopped alcohol he reports    Is agreeable for trial of autopap with nasal pillow.    Using a sinus pill with relief of sinus congestion.       Patient Care Team:  Deshaun Goel MD as PCP - General (Internal Medicine)  Magnus Rashid MD as Consulting Physician (Dermatology)  Gillian Williamson MD as Consulting Physician (Dermatology)  Jae Weathers MA as Care Coordinator    Patient Active Problem List    Diagnosis Date Noted    Obstructive sleep apnea 7/18/22 HST with mod NATTY AHI 20      7/18/22 HST with mod NATTY AHI  20      Macrocytic anemia 01/28/2022    Abnormal glucose 07/26/2021    AK (actinic keratosis) 09/24/2020 6/2020 efudex/dovonex bid x 5 days      ARGUETA (dyspnea on exertion) 06/15/2020    Leg swelling 06/15/2020    Benzodiazepine dependence 01/15/2020    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                           There is 40 - 49% left Internal Carotid stenosis.   July 2011      Pure hypercholesterolemia 11/06/2012       PAST MEDICAL PROBLEMS, PAST SURGICAL HISTORY: please see relevant portions of the electronic medical record    ALLERGIES AND MEDICATIONS: updated and reviewed.  Review of patient's allergies indicates:   Allergen Reactions    Iodine and iodide containing products Anaphylaxis    Adhesive     Dye Other (See Comments)    Iodinated contrast media     Trazodone      dizziness    Keflex [cephalexin] Diarrhea and Nausea And Vomiting    Penicillins Rash       Medication List with Changes/Refills   Current Medications    ASPIRIN (ECOTRIN) 81 MG EC TABLET    Take 81 mg by mouth once daily.      ATORVASTATIN (LIPITOR) 40 MG TABLET    Take 1 tablet (40 mg total) by mouth once daily.    CHOLECALCIFEROL, VITAMIN D3, (VITAMIN D3) 50 MCG (2,000 UNIT) TAB    Take 1 tablet by mouth once daily.    CLOTRIMAZOLE-BETAMETHASONE 1-0.05% (LOTRISONE) CREAM    apply topically to affected area 2 times daily as needed;  area    FUROSEMIDE (LASIX) 20 MG TABLET    TAKE 1 TABLET BY MOUTH EVERY DAY    LISINOPRIL 10 MG TABLET    Take 1 tablet (10 mg total) by mouth once daily.    MELOXICAM (MOBIC) 7.5 MG TABLET    TAKE 1 TABLET(7.5 MG) BY MOUTH EVERY DAY FOR 14 DAYS    METOPROLOL SUCCINATE (TOPROL-XL) 100 MG 24 HR TABLET    Take 1 tablet (100 mg total) by mouth once daily.    MULTIVITAMIN ORAL    Take 1 tablet by mouth once daily.    NITROGLYCERIN (NITROSTAT) 0.4 MG SL TABLET    Place 1 tablet under the tongue as needed.         Objective:   Physical Exam   Vitals:    08/05/22 1518   BP: 120/78   Pulse: 67   Temp: 98 °F (36.7 °C)   TempSrc: Oral   SpO2: 99%   Weight: 91.3 kg (201 lb 6.2 oz)   Height: 6' (1.829 m)    Body mass index is 27.31 kg/m².            Physical Exam  Constitutional:       General: He is not in acute distress.     Appearance: He is well-developed.   HENT:      Head: Normocephalic and atraumatic.   Eyes:      General: No scleral icterus.  Pulmonary:      Effort: Pulmonary effort is normal.   Skin:     General: Skin is warm and dry.   Neurological:      Mental Status: He is alert and oriented to person, place, and time.   Psychiatric:         Behavior: Behavior normal.         Thought Content: Thought content normal.

## 2022-10-28 DIAGNOSIS — I10 ESSENTIAL HYPERTENSION: ICD-10-CM

## 2022-10-28 RX ORDER — FUROSEMIDE 20 MG/1
20 TABLET ORAL DAILY
Qty: 90 TABLET | Refills: 1 | Status: SHIPPED | OUTPATIENT
Start: 2022-10-28 | End: 2023-02-06 | Stop reason: SDUPTHER

## 2022-10-28 NOTE — TELEPHONE ENCOUNTER
No new care gaps identified.  [facility] Embedded Care Gaps. Reference number: [id number]. 10/28/2022   1:13:23 PM CDT

## 2022-10-28 NOTE — TELEPHONE ENCOUNTER
----- Message from Airam Price sent at 10/28/2022 12:52 PM CDT -----  Regarding: Self/  [phone]  Type: Patient Call Back    Who called:  Patient    What is the request in detail:  Patient is needing a call back from staff to discuss his medication.  Thank you    Would the patient rather a call back or a response via [facility]?  Call back    Best call back number:  [phone]       Thank you

## 2022-11-01 ENCOUNTER — OFFICE VISIT (OUTPATIENT)
Dept: OPTOMETRY | Facility: CLINIC | Age: 87
End: 2022-11-01
Payer: MEDICARE

## 2022-11-01 DIAGNOSIS — H52.203 ASTIGMATISM OF BOTH EYES, UNSPECIFIED TYPE: ICD-10-CM

## 2022-11-01 DIAGNOSIS — H43.21 ASTEROID HYALOSIS OF RIGHT EYE: ICD-10-CM

## 2022-11-01 DIAGNOSIS — H43.813 PVD (POSTERIOR VITREOUS DETACHMENT), BILATERAL: ICD-10-CM

## 2022-11-01 DIAGNOSIS — H16.103 SUPERFICIAL KERATITIS OF BOTH EYES: ICD-10-CM

## 2022-11-01 DIAGNOSIS — H04.123 DRY EYE SYNDROME, BILATERAL: Primary | ICD-10-CM

## 2022-11-01 DIAGNOSIS — Z96.1 PSEUDOPHAKIA OF BOTH EYES: ICD-10-CM

## 2022-11-01 PROCEDURE — 99212 OFFICE O/P EST SF 10 MIN: CPT | Mod: PBBFAC | Performed by: OPTOMETRIST

## 2022-11-01 PROCEDURE — 92015 DETERMINE REFRACTIVE STATE: CPT | Mod: ,,, | Performed by: OPTOMETRIST

## 2022-11-01 PROCEDURE — 92004 PR EYE EXAM, NEW PATIENT,COMPREHESV: ICD-10-PCS | Mod: S$PBB,,, | Performed by: OPTOMETRIST

## 2022-11-01 PROCEDURE — 99999 PR PBB SHADOW E&M-EST. PATIENT-LVL II: ICD-10-PCS | Mod: PBBFAC,,, | Performed by: OPTOMETRIST

## 2022-11-01 PROCEDURE — 92004 COMPRE OPH EXAM NEW PT 1/>: CPT | Mod: S$PBB,,, | Performed by: OPTOMETRIST

## 2022-11-01 PROCEDURE — 99999 PR PBB SHADOW E&M-EST. PATIENT-LVL II: CPT | Mod: PBBFAC,,, | Performed by: OPTOMETRIST

## 2022-11-01 PROCEDURE — 92015 PR REFRACTION: ICD-10-PCS | Mod: ,,, | Performed by: OPTOMETRIST

## 2022-11-01 NOTE — PROGRESS NOTES
HPI     Annual Exam     Additional comments: Aleks Brown is a 88 y/o male           Comments    Patient's age: 89 y.o.    Approximate date of last eye examination:02/17  Name of last eye doctor seen:      Pt state [facility] has changed. Pt state he needs glasses.   Pt state he need to pass drivers test for license.Pt state he was not   aware that after cataract surgery,his VA would get bad again. Double   vision OS.    Wears glasses? readers       Wears CLs?:  no             Headaches?  no  Eye pain/discomfort?  no                                                                                     Flashes?  no  Floaters?  no  Diplopia/Double vision?  no    Patient's Ocular History:         Any eye surgeries? Cataract           Significant patient medical history:         1. Diabetes?  no       If yes, IDDM or NIDDM? no   2. HBP?  Yes, medication and diet control                 ! OTC eyedrops currently using:  none   ! Prescription eye meds currently using:  none               Last edited by Luís Sánchez on 11/1/2022  9:11 AM.            Assessment /Plan     For exam results, see Encounter Report.    Dry eye syndrome, bilateral  Superficial keratitis of both eyes   Use PF systane complete BID OU    Astigmatism of both eyes, unspecified type   Rx specs    Essential hypertension  Pseudophakia of both eyes  Asteroid hyalosis of right eye  PVD (posterior vitreous detachment), bilateral     Good overall ocular health, monitor yearly        RTC 1 year, sooner PRN

## 2022-11-14 ENCOUNTER — OFFICE VISIT (OUTPATIENT)
Dept: DERMATOLOGY | Facility: CLINIC | Age: 87
End: 2022-11-14
Payer: MEDICARE

## 2022-11-14 DIAGNOSIS — R23.8 VENOUS LAKE OF LIP: ICD-10-CM

## 2022-11-14 DIAGNOSIS — L57.0 AK (ACTINIC KERATOSIS): ICD-10-CM

## 2022-11-14 DIAGNOSIS — L82.1 SK (SEBORRHEIC KERATOSIS): ICD-10-CM

## 2022-11-14 DIAGNOSIS — Z85.828 HISTORY OF NONMELANOMA SKIN CANCER: ICD-10-CM

## 2022-11-14 DIAGNOSIS — D48.5 NEOPLASM OF UNCERTAIN BEHAVIOR OF SKIN: Primary | ICD-10-CM

## 2022-11-14 PROCEDURE — 99213 OFFICE O/P EST LOW 20 MIN: CPT | Mod: 25,S$PBB,, | Performed by: DERMATOLOGY

## 2022-11-14 PROCEDURE — 17003 DESTRUCT PREMALG LES 2-14: CPT | Mod: 59,PBBFAC | Performed by: DERMATOLOGY

## 2022-11-14 PROCEDURE — 11102 TANGNTL BX SKIN SINGLE LES: CPT | Mod: PBBFAC | Performed by: DERMATOLOGY

## 2022-11-14 PROCEDURE — 99213 OFFICE O/P EST LOW 20 MIN: CPT | Mod: PBBFAC,25 | Performed by: DERMATOLOGY

## 2022-11-14 PROCEDURE — 11102 TANGNTL BX SKIN SINGLE LES: CPT | Mod: S$PBB,,, | Performed by: DERMATOLOGY

## 2022-11-14 PROCEDURE — 99999 PR PBB SHADOW E&M-EST. PATIENT-LVL III: CPT | Mod: PBBFAC,,, | Performed by: DERMATOLOGY

## 2022-11-14 PROCEDURE — 99213 PR OFFICE/OUTPT VISIT, EST, LEVL III, 20-29 MIN: ICD-10-PCS | Mod: 25,S$PBB,, | Performed by: DERMATOLOGY

## 2022-11-14 PROCEDURE — 88305 TISSUE EXAM BY PATHOLOGIST: ICD-10-PCS | Mod: 26,,, | Performed by: PATHOLOGY

## 2022-11-14 PROCEDURE — 17000 DESTRUCT PREMALG LESION: CPT | Mod: 59,PBBFAC | Performed by: DERMATOLOGY

## 2022-11-14 PROCEDURE — 88305 TISSUE EXAM BY PATHOLOGIST: CPT | Performed by: PATHOLOGY

## 2022-11-14 PROCEDURE — 17000 DESTRUCT PREMALG LESION: CPT | Mod: 59,S$PBB,, | Performed by: DERMATOLOGY

## 2022-11-14 PROCEDURE — 17003 DESTRUCT PREMALG LES 2-14: CPT | Mod: 59,S$PBB,, | Performed by: DERMATOLOGY

## 2022-11-14 PROCEDURE — 99999 PR PBB SHADOW E&M-EST. PATIENT-LVL III: ICD-10-PCS | Mod: PBBFAC,,, | Performed by: DERMATOLOGY

## 2022-11-14 PROCEDURE — 11102 PR TANGENTIAL BIOPSY, SKIN, SINGLE LESION: ICD-10-PCS | Mod: S$PBB,,, | Performed by: DERMATOLOGY

## 2022-11-14 PROCEDURE — 88305 TISSUE EXAM BY PATHOLOGIST: CPT | Mod: 26,,, | Performed by: PATHOLOGY

## 2022-11-14 PROCEDURE — 17000 PR DESTRUCTION(LASER SURGERY,CRYOSURGERY,CHEMOSURGERY),PREMALIGNANT LESIONS,FIRST LESION: ICD-10-PCS | Mod: 59,S$PBB,, | Performed by: DERMATOLOGY

## 2022-11-14 PROCEDURE — 17003 DESTRUCTION, PREMALIGNANT LESIONS; SECOND THROUGH 14 LESIONS: ICD-10-PCS | Mod: 59,S$PBB,, | Performed by: DERMATOLOGY

## 2022-11-14 NOTE — PROGRESS NOTES
Subjective:       Patient ID:  Aleks Brown is a 89 y.o. male who presents for   Chief Complaint   Patient presents with    Skin Check     UBSE     History of Present Illness: The patient presents for follow up of skin check.    The patient was last seen on: 02/01/2022 for cryosurgery to actinic keratoses which have resolved.  This is a high risk patient here to check for the development of new lesions.    Other skin complaints: none           Review of Systems   Skin:  Positive for activity-related sunscreen use and wears hat (always). Negative for daily sunscreen use and recent sunburn.   Hematologic/Lymphatic: Does not bruise/bleed easily.      Objective:    Physical Exam   Constitutional: He appears well-developed and well-nourished. No distress.   Neurological: He is alert and oriented to person, place, and time. He is not disoriented.   Psychiatric: He has a normal mood and affect.   Skin:   Areas Examined (abnormalities noted in diagram):   Scalp / Hair Palpated and Inspected  Head / Face Inspection Performed  Neck Inspection Performed  Chest / Axilla Inspection Performed  Back Inspection Performed  RUE Inspected  LUE Inspection Performed                         Diagram Legend     Erythematous scaling macule/papule c/w actinic keratosis       Vascular papule c/w angioma      Pigmented verrucoid papule/plaque c/w seborrheic keratosis      Yellow umbilicated papule c/w sebaceous hyperplasia      Irregularly shaped tan macule c/w lentigo     1-2 mm smooth white papules consistent with Milia      Movable subcutaneous cyst with punctum c/w epidermal inclusion cyst      Subcutaneous movable cyst c/w pilar cyst      Firm pink to brown papule c/w dermatofibroma      Pedunculated fleshy papule(s) c/w skin tag(s)      Evenly pigmented macule c/w junctional nevus     Mildly variegated pigmented, slightly irregular-bordered macule c/w mildly atypical nevus      Flesh colored to evenly pigmented papule c/w  intradermal nevus       Pink pearly papule/plaque c/w basal cell carcinoma      Erythematous hyperkeratotic cursted plaque c/w SCC      Surgical scar with no sign of skin cancer recurrence      Open and closed comedones      Inflammatory papules and pustules      Verrucoid papule consistent consistent with wart     Erythematous eczematous patches and plaques     Dystrophic onycholytic nail with subungual debris c/w onychomycosis     Umbilicated papule    Erythematous-base heme-crusted tan verrucoid plaque consistent with inflamed seborrheic keratosis     Erythematous Silvery Scaling Plaque c/w Psoriasis     See annotation            Assessment / Plan:      Pathology Orders:       Normal Orders This Visit    Specimen to Pathology, Dermatology     Questions:    Procedure Type: Dermatology and skin neoplasms    Number of Specimens: 1    ------------------------: -------------------------    Spec 1 Procedure: Biopsy    Spec 1 Clinical Impression: r/o scc    Spec 1 Source: left dorsal hand    Release to patient: Immediate          Neoplasm of uncertain behavior of skin  -     Specimen to Pathology, Dermatology    Shave biopsy procedure note:    Shave biopsy performed after verbal consent including risk of infection, scar, recurrence, need for additional treatment of site. Area prepped with alcohol, anesthetized with approximately 1.0cc of 1% lidocaine with epinephrine. Lesional tissue shaved with razor blade. Hemostasis achieved with application of aluminum chloride followed by hyfrecation. No complications. Dressing applied. Wound care explained.      AK (actinic keratosis)  Cryosurgery Procedure Note    Verbal consent from the patient is obtained including, but not limited to, risk of hypopigmentation/hyperpigmentation, scar, recurrence of lesion. The patient is aware of the precancerous quality and need for treatment of these lesions. Liquid nitrogen cryosurgery is applied to the 3 actinic keratoses, as detailed in  the physical exam, to produce a freeze injury. The patient is aware that blisters may form and is instructed on wound care with gentle cleansing and use of vaseline ointment to keep moist until healed. The patient is supplied a handout on cryosurgery and is instructed to call if lesions do not completely resolve.      SK (seborrheic keratosis)   - minor problem and chronic.   Reassurance given to patient. No treatment necessary.       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   Reassurance given to patient. No treatment necessary.       History of nonmelanoma skin cancer  Area(s) of previous NMSC evaluated with no signs of recurrence.    Upper body skin examination performed today including at least 6 points as noted in physical examination. Suspicious lesions noted.    Recommend daily sun protection/avoidance and use of at least SPF 30, broad spectrum sunscreen (OTC drug).              No follow-ups on file.

## 2022-11-14 NOTE — PATIENT INSTRUCTIONS
Shave Biopsy Wound Care    Your doctor has performed a shave biopsy today.  A band aid and vaseline ointment has been placed over the site.  This should remain in place for NO LONGER THAN 48 hours.  It is fine to remove the bandaid after 24 hours, if the area is no longer bleeding. It is recommended that you keep the area dry (do not wet)) for the first 24 hours.  After 24 hours, wash the area with warm soap and water and apply Vaseline jelly.  Many patients prefer to use Neosporin or Bacitracin ointment.  This is acceptable; however, know that you can develop an allergy to this medication even if you have used it safely for years.  It is important to keep the area moist.  Letting it dry out and get air slows healing time, and will worsen the scar.        If you notice increasing redness, tenderness, pain, or yellow drainage at the biopsy site, please notify your doctor.  These are signs of an infection.    If your biopsy site is bleeding, apply firm pressure for 15 minutes straight.  Repeat for another 15 minutes, if it is still bleeding.   If the surgical site continues to bleed, then please contact your doctor.      For [facility] users:   You will receive your biopsy results in [facility] as soon as they are available. Please be assured that your physician/provider will review your results and will then determine what further treatment, evaluation, or planning is required. You should be contacted by your physician's/provider's office within 5 business days of receiving your results; If not, please reach out to directly. This is one more way [facility] is putting you first.     [facility] [place] [address]/ [phone] FAX/ [email or web address]       CRYOSURGERY      Your doctor has used a method called cryosurgery to treat your skin condition. Cryosurgery refers to the use of very cold substances to treat a variety of skin conditions such as warts, pre-skin cancers, molluscum  contagiosum, sun spots, and several benign growths. The substance we use in cryosurgery is liquid nitrogen and is so cold (-195 degrees Celsius) that is burns when administered.     Following treatment in the office, the skin may immediately burn and become red. You may find the area around the lesion is affected as well. It is sometimes necessary to treat not only the lesion, but a small area of the surrounding normal skin to achieve a good response.     A blister, and even a blood filled blister, may form after treatment.   This is a normal response. If the blister is painful, it is acceptable to sterilize a needle and with rubbing alcohol and gently pop the blister. It is important that you gently wash the area with soap and warm water as the blister fluid may contain wart virus if a wart was treated. Do no remove the roof of the blister.     The area treated can take anywhere from 1-3 weeks to heal. Healing time depends on the kind skin lesion treated, the location, and how aggressively the lesion was treated. It is recommended that the areas treated are covered with Vaseline or bacitracin ointment and a band-aid. If a band-aid is not practical, just ointment applied several times per day will do. Keeping these areas moist will speed the healing time.    Treatment with liquid nitrogen can leave a scar. In dark skin, it may be a light or dark scar, in light skin it may be a white or pink scar. These will generally fade with time, but may never go away completely.     If you have any concerns after your treatment, please feel free to call the office.       [facility] [place] [address]/ [phone] FAX/ [email or web address]

## 2022-11-23 LAB
FINAL PATHOLOGIC DIAGNOSIS: NORMAL
GROSS: NORMAL
Lab: NORMAL
MICROSCOPIC EXAM: NORMAL

## 2022-12-13 ENCOUNTER — OFFICE VISIT (OUTPATIENT)
Dept: DERMATOLOGY | Facility: CLINIC | Age: 87
End: 2022-12-13
Payer: MEDICARE

## 2022-12-13 DIAGNOSIS — D04.9 BOWEN'S DISEASE: Primary | ICD-10-CM

## 2022-12-13 PROCEDURE — 99499 UNLISTED E&M SERVICE: CPT | Mod: S$PBB,,, | Performed by: DERMATOLOGY

## 2022-12-13 PROCEDURE — 99999 PR PBB SHADOW E&M-EST. PATIENT-LVL I: ICD-10-PCS | Mod: PBBFAC,,, | Performed by: DERMATOLOGY

## 2022-12-13 PROCEDURE — 17272 DSTR MAL LES S/N/H/F/G 1.1-2: CPT | Mod: PBBFAC | Performed by: DERMATOLOGY

## 2022-12-13 PROCEDURE — 17272 PR DESTR MALIG SCAL,NCK,HAND 1.1-2 CM: ICD-10-PCS | Mod: S$PBB,,, | Performed by: DERMATOLOGY

## 2022-12-13 PROCEDURE — 99211 OFF/OP EST MAY X REQ PHY/QHP: CPT | Mod: PBBFAC | Performed by: DERMATOLOGY

## 2022-12-13 PROCEDURE — 99499 NO LOS: ICD-10-PCS | Mod: S$PBB,,, | Performed by: DERMATOLOGY

## 2022-12-13 PROCEDURE — 99999 PR PBB SHADOW E&M-EST. PATIENT-LVL I: CPT | Mod: PBBFAC,,, | Performed by: DERMATOLOGY

## 2022-12-13 PROCEDURE — 17272 DSTR MAL LES S/N/H/F/G 1.1-2: CPT | Mod: S$PBB,,, | Performed by: DERMATOLOGY

## 2022-12-13 NOTE — PROGRESS NOTES
Here for electrodesiccation and curettage of Superficial 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 dorsal hand. 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 You will be contacted regarding treatment.       Comment:     Interp By Iain Garrido M.D., Signed on 11/23/2022 at 13:24           Electrodessication and Curettage Procedure note:    Verbal consent obtained. Lesional tissue marked and prepped with alcohol. Lesion anesthetized with 1% lidocaine with epinephrine. Curettage and Desiccation x 3 cycles to base. Aluminum chloride for hemostasis. Lesion size after primary curettage: 1.3 cm    Area bandaged and wound care explained.    F/u 3 months

## 2022-12-13 NOTE — PATIENT INSTRUCTIONS

## 2023-02-02 ENCOUNTER — LAB VISIT (OUTPATIENT)
Dept: LAB | Facility: HOSPITAL | Age: 88
End: 2023-02-02
Attending: INTERNAL MEDICINE
Payer: MEDICARE

## 2023-02-02 DIAGNOSIS — D53.9 MACROCYTIC ANEMIA: ICD-10-CM

## 2023-02-02 DIAGNOSIS — R73.09 ABNORMAL GLUCOSE: ICD-10-CM

## 2023-02-02 DIAGNOSIS — I10 ESSENTIAL HYPERTENSION: ICD-10-CM

## 2023-02-02 LAB
ALBUMIN SERPL BCP-MCNC: 3.8 G/DL (ref 3.5–5.2)
ALP SERPL-CCNC: 95 U/L (ref 55–135)
ALT SERPL W/O P-5'-P-CCNC: 20 U/L (ref 10–44)
ANION GAP SERPL CALC-SCNC: 5 MMOL/L (ref 8–16)
AST SERPL-CCNC: 27 U/L (ref 10–40)
BASOPHILS # BLD AUTO: 0.06 K/UL (ref 0–0.2)
BASOPHILS NFR BLD: 0.7 % (ref 0–1.9)
BILIRUB SERPL-MCNC: 0.7 MG/DL (ref 0.1–1)
BUN SERPL-MCNC: 21 MG/DL (ref 8–23)
CALCIUM SERPL-MCNC: 10.2 MG/DL (ref 8.7–10.5)
CHLORIDE SERPL-SCNC: 104 MMOL/L (ref 95–110)
CO2 SERPL-SCNC: 30 MMOL/L (ref 23–29)
CREAT SERPL-MCNC: 1 MG/DL (ref 0.5–1.4)
DIFFERENTIAL METHOD: ABNORMAL
EOSINOPHIL # BLD AUTO: 0.4 K/UL (ref 0–0.5)
EOSINOPHIL NFR BLD: 5.1 % (ref 0–8)
ERYTHROCYTE [DISTWIDTH] IN BLOOD BY AUTOMATED COUNT: 12.2 % (ref 11.5–14.5)
EST. GFR  (NO RACE VARIABLE): >60 ML/MIN/1.73 M^2
ESTIMATED AVG GLUCOSE: 117 MG/DL (ref 68–131)
GLUCOSE SERPL-MCNC: 107 MG/DL (ref 70–110)
HBA1C MFR BLD: 5.7 % (ref 4–5.6)
HCT VFR BLD AUTO: 38.5 % (ref 40–54)
HGB BLD-MCNC: 12.1 G/DL (ref 14–18)
IMM GRANULOCYTES # BLD AUTO: 0.03 K/UL (ref 0–0.04)
IMM GRANULOCYTES NFR BLD AUTO: 0.4 % (ref 0–0.5)
LYMPHOCYTES # BLD AUTO: 1.8 K/UL (ref 1–4.8)
LYMPHOCYTES NFR BLD: 22.1 % (ref 18–48)
MCH RBC QN AUTO: 32.4 PG (ref 27–31)
MCHC RBC AUTO-ENTMCNC: 31.4 G/DL (ref 32–36)
MCV RBC AUTO: 103 FL (ref 82–98)
MONOCYTES # BLD AUTO: 1 K/UL (ref 0.3–1)
MONOCYTES NFR BLD: 11.8 % (ref 4–15)
NEUTROPHILS # BLD AUTO: 5 K/UL (ref 1.8–7.7)
NEUTROPHILS NFR BLD: 59.9 % (ref 38–73)
NRBC BLD-RTO: 0 /100 WBC
PLATELET # BLD AUTO: 228 K/UL (ref 150–450)
PMV BLD AUTO: 13.8 FL (ref 9.2–12.9)
POTASSIUM SERPL-SCNC: 5.2 MMOL/L (ref 3.5–5.1)
PROT SERPL-MCNC: 7.1 G/DL (ref 6–8.4)
RBC # BLD AUTO: 3.73 M/UL (ref 4.6–6.2)
SODIUM SERPL-SCNC: 139 MMOL/L (ref 136–145)
WBC # BLD AUTO: 8.25 K/UL (ref 3.9–12.7)

## 2023-02-02 PROCEDURE — 83036 HEMOGLOBIN GLYCOSYLATED A1C: CPT | Performed by: INTERNAL MEDICINE

## 2023-02-02 PROCEDURE — 85025 COMPLETE CBC W/AUTO DIFF WBC: CPT | Performed by: INTERNAL MEDICINE

## 2023-02-02 PROCEDURE — 80053 COMPREHEN METABOLIC PANEL: CPT | Performed by: INTERNAL MEDICINE

## 2023-02-02 PROCEDURE — 36415 COLL VENOUS BLD VENIPUNCTURE: CPT | Mod: PO | Performed by: INTERNAL MEDICINE

## 2023-02-03 PROBLEM — N18.31 CHRONIC KIDNEY DISEASE, STAGE 3A: Status: ACTIVE | Noted: 2023-02-03

## 2023-02-03 NOTE — PROGRESS NOTES
This note was created by combination of typed  and M-Modal dictation.  Transcription errors may be present.  If there are any questions, please contact me.    Assessment and Plan:   Essential hypertension  Chronic kidney disease, stage 3a  -pre visit labs stable  On metoprolol and lisinopril  Unclaer if he is taking lasix regularly  He'll try to get me list of meds from home. And bring next time as well  Refilled to pharmacy  He says he takes 4 pills. I think should be these 3 and statin.  Off of BZD  -     lisinopriL 10 MG tablet; Take 1 tablet (10 mg total) by mouth once daily.  Dispense: 90 tablet; Refill: 3  -     furosemide (LASIX) 20 MG tablet; Take 1 tablet (20 mg total) by mouth once daily.  Dispense: 90 tablet; Refill: 3  -     metoprolol succinate (TOPROL-XL) 100 MG 24 hr tablet; Take 1 tablet (100 mg total) by mouth once daily.  Dispense: 90 tablet; Refill: 3  -     CBC Auto Differential; Future; Expected date: 02/03/2024  -     Comprehensive Metabolic Panel; Future; Expected date: 02/03/2024    Pure hypercholesterolemia  Mixed hyperlipidemia  Coronary artery disease involving native coronary artery of native heart without angina pectoris  Bilateral carotid artery stenosis  -pre visit labs good on statin no changes. On beta blocker.   -     Lipid Panel; Future; Expected date: 02/03/2024  -     atorvastatin (LIPITOR) 40 MG tablet; Take 1 tablet (40 mg total) by mouth once daily.  Dispense: 90 tablet; Refill: 3  -     metoprolol succinate (TOPROL-XL) 100 MG 24 hr tablet; Take 1 tablet (100 mg total) by mouth once daily.  Dispense: 90 tablet; Refill: 3    Abnormal glucose  -a1c stable. No changes.   -     Hemoglobin A1C; Future; Expected date: 02/03/2024    Macrocytic anemia  -stable.  Notes he has cut down on EtOH.  Had been daily now a few times a week. Notes heartburn with wine with dinner, pepcid PRN helps.  -     CBC Auto Differential; Future; Expected date: 02/03/2024    Sensorimotor  neuropathy  -[facility]    Benzodiazepine dependence  -off of BZD, self discontinued.  Stable.   Avoids tylenol otherwise that triggers PTSD  Ibuprofen seems to be ok  [facility]    Needs flu shot  -     Influenza - Quadrivalent (Adjuvanted)    Medications Discontinued During This Encounter   Medication Reason    atorvastatin (LIPITOR) 40 MG tablet Reorder    lisinopriL 10 MG tablet Reorder    metoprolol succinate (TOPROL-XL) 100 MG 24 hr tablet Reorder    furosemide (LASIX) 20 MG tablet Reorder       meds sent this encounter:  Medications Ordered This Encounter   Medications    atorvastatin (LIPITOR) 40 MG tablet     Sig: Take 1 tablet (40 mg total) by mouth once daily.     Dispense:  90 tablet     Refill:  3    furosemide (LASIX) 20 MG tablet     Sig: Take 1 tablet (20 mg total) by mouth once daily.     Dispense:  90 tablet     Refill:  3    lisinopriL 10 MG tablet     Sig: Take 1 tablet (10 mg total) by mouth once daily.     Dispense:  90 tablet     Refill:  3     .    metoprolol succinate (TOPROL-XL) 100 MG 24 hr tablet     Sig: Take 1 tablet (100 mg total) by mouth once daily.     Dispense:  90 tablet     Refill:  3       Follow Up: No follow-ups on file. OV 6 months no labs.  Labs 1 year.     Subjective:     Chief Complaint   Patient presents with    Hypertension     Follow up        HPI  Aleks is a 89 y.o. male.     Social History     Tobacco Use    Smoking status: Former    Smokeless tobacco: Never   Substance Use Topics    Alcohol use: Yes     Comment: Red wine daily      Social History     Occupational History    Occupation: 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  .  Six kids. [place] [employer] vet.  [employer] [facility] [place].         Social History     Social History Narrative     x 60 yr.  [place] [employer] [facility] [place].         Last appointment with this clinic was Visit date not found. Last visit with me 8/5/2022   To summarize last visit and events leading up to  today:  Hypertension, CKD stage IIIA, hyperlipidemia, abnormal glucose, stable   Macrocytic anemia with alcohol use.  At the time he noted he had stop alcohol maybe for 5 months ago.    NATTY was agreeable to try CPAP with nasal pillow.  Eczema  area.  Lotrisone.    Benzodiazepine dependence.  Stable off of lorazepam    Saw Derm.  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 dorsal hand    Order Name Results Value Reference Range Date Interpretation   LIPID PROFILE TRIGLYCERIDE [MASS/VOLUME] IN SERUM OR PLASMA 100  0 - 200 01/24/2023     LIPID PROFILE CHOLESTEROL IN LDL [MASS/VOLUME] IN SERUM OR PLASMA BY CALCULATION 78.3  0 - 100 01/24/2023     LIPID PROFILE CHOLESTEROL [MASS/VOLUME] IN SERUM OR PLASMA 137  0 - 240 01/24/2023     LIPID PROFILE CHOLESTEROL IN LDL [MASS/VOLUME] IN SERUM OR PLASMA BY DIRECT ASSAY 73    01/24/2023     LIPID PROFILE CHOLESTEROL IN HDL [MASS/VOLUME] IN SERUM OR PLASMA 38.7  40 01/24/2023 L   CBC WITH PLATELET COUNT LEUKOCYTES [#/VOLUME] IN BLOOD BY AUTOMATED COUNT 8.1  4.8 - 10.8 01/24/2023     CBC WITH PLATELET COUNT ERYTHROCYTES [#/VOLUME] IN BLOOD BY AUTOMATED COUNT 3.63  4.50 - 6.10 01/24/2023 L   CBC WITH PLATELET COUNT HEMOGLOBIN [MASS/VOLUME] IN BLOOD 12.1  14.0 - 18.0 01/24/2023 L   CBC WITH PLATELET COUNT HEMATOCRIT [VOLUME FRACTION] OF BLOOD BY AUTOMATED COUNT 36.2  42.0 - 52.0 01/24/2023 L   CBC WITH PLATELET COUNT MCV [ENTITIC VOLUME] BY AUTOMATED COUNT 99.7  81.0 - 98.0 01/24/2023 H   CBC WITH PLATELET COUNT MCH [ENTITIC MASS] BY AUTOMATED COUNT 33.3  27.0 - 32.6 01/24/2023 H   CBC WITH PLATELET COUNT MCHC [MASS/VOLUME] BY AUTOMATED COUNT 33.4  32.2 - 34.8 01/24/2023     CBC WITH PLATELET COUNT PLATELETS [#/VOLUME] IN BLOOD BY AUTOMATED COUNT 234  140 - 420 01/24/2023     CBC WITH PLATELET COUNT PLATELET MEAN VOLUME [ENTITIC VOLUME] IN BLOOD BY AUTOMATED COUNT 8.8  7.4 - 10.8 01/24/2023     CBC WITH PLATELET COUNT ERYTHROCYTE DISTRIBUTION WIDTH [RATIO] BY AUTOMATED COUNT 12.8  11.8 - 14.9 01/24/2023      COMPREHENSIVE METABOLIC PANEL CREATININE [MASS/VOLUME] IN SERUM OR PLASMA 1.0  0.6 - 1.3 01/24/2023     COMPREHENSIVE METABOLIC PANEL UREA NITROGEN [MASS/VOLUME] IN SERUM OR PLASMA 34  7 - 20 01/24/2023 H   COMPREHENSIVE METABOLIC PANEL GLUCOSE [MASS/VOLUME] IN SERUM OR PLASMA 111  70 - 110 01/24/2023 H   COMPREHENSIVE METABOLIC PANEL SODIUM [MOLES/VOLUME] IN SERUM OR PLASMA 139  136 - 144 01/24/2023     COMPREHENSIVE METABOLIC PANEL POTASSIUM [MOLES/VOLUME] IN SERUM OR PLASMA 4.6  3.6 - 5.1 01/24/2023     COMPREHENSIVE METABOLIC PANEL CHLORIDE [MOLES/VOLUME] IN SERUM OR PLASMA 105  101 - 111 01/24/2023     COMPREHENSIVE METABOLIC PANEL CARBON DIOXIDE, TOTAL [MOLES/VOLUME] IN SERUM OR PLASMA 28  22 - 32 01/24/2023     COMPREHENSIVE METABOLIC PANEL CALCIUM [MASS/VOLUME] IN SERUM OR PLASMA 9.4  8.9 - 10.3 01/24/2023     COMPREHENSIVE METABOLIC PANEL PROTEIN [MASS/VOLUME] IN SERUM OR PLASMA 7.0  6.7 - 8.5 01/24/2023     COMPREHENSIVE METABOLIC PANEL ALBUMIN [MASS/VOLUME] IN SERUM OR PLASMA 3.8  3.5 - 5.0 01/24/2023     COMPREHENSIVE METABOLIC PANEL BILIRUBIN.TOTAL [MASS/VOLUME] IN SERUM OR PLASMA 0.7  0.1 - 1.3 01/24/2023     COMPREHENSIVE METABOLIC PANEL ALKALINE PHOSPHATASE [ENZYMATIC ACTIVITY/VOLUME] IN SERUM OR PLASMA 80  38 - 126 01/24/2023     COMPREHENSIVE METABOLIC PANEL ASPARTATE AMINOTRANSFERASE [ENZYMATIC ACTIVITY/VOLUME] IN SERUM OR PLASMA 24  15 - 41 01/24/2023     COMPREHENSIVE METABOLIC PANEL ALANINE AMINOTRANSFERASE [ENZYMATIC ACTIVITY/VOLUME] IN SERUM OR PLASMA 20  12 - 63 01/24/2023     COMPREHENSIVE METABOLIC PANEL GLOMERULAR FILTRATION RATE/1.73 SQ M.PREDICTED [VOLUME RATE/AREA] IN SERUM OR PLASMA BY CREATININE-BASED FORMULA (MDRD) 71    01/24/2023     GLYCOLATED HEMOGLOBIN A1C HEMOGLOBIN A1C/HEMOGLOBIN.TOTAL IN BLOOD BY HPLC 5.9  4.2 - 5.8 01/24/2023 H   TSH THYROTROPIN [UNITS/VOLUME] IN SERUM OR PLASMA 5.42  0.34 - 5.6 01/24/2023     B12/FOLATE PANEL COBALAMIN (VITAMIN B12) [MASS/VOLUME] IN  SERUM OR PLASMA 571  180 - 914 01/24/2023     B12/FOLATE PANEL FOLATE [MASS/VOLUME] IN SERUM OR PLASMA 14.3  5.9 01/24/2023     VITAMIN D-25 HYDROXY 25-HYDROXYVITAMIN D3 [MASS/VOLUME] IN SERUM OR PLASMA 43.8  30 - 100 01/24/2023     FERRITIN FERRITIN [MASS/VOLUME] IN SERUM OR PLASMA 101  24 - 336 01/24/2023     IRON PANEL IRON [MASS/VOLUME] IN SERUM OR PLASMA 109  50 - 170 01/24/2023     IRON PANEL TRANSFERRIN [MASS/VOLUME] IN SERUM OR PLASMA 194.9  180.0 - 329.0 01/24/2023     IRON PANEL IRON SATURATION [MASS FRACTION] IN SERUM OR PLASMA 45    01/24/2023     VITAMIN D-25 HYDROXY 25-HYDROXYVITAMIN D3 [MASS/VOLUME] IN SERUM OR PLASMA 54.8  30 - 100 07/21/2022        Pre visit labs  CBC macrocytic anemia stable  CMP potassium mildly elevated creatinine 1.0  A1c 5.7 from previous 5.8    Today's visit:  [facility]  Chair lift at the home.     Glass of wine with dinner causes heartburn.  Notes drinking infrequent now.  Has tried rolaids and sometimes pepcid; if he goes out to eat may take a pepcid.  Typically mild spiced food.   When he does drink wine, he'll take a pepcid and that seems to control it.    Episodic constipation.  Tends to avoid meds for this.      Remains off of lorazepam. Notes lifelong has not needed a lot of sleep.   Nighttime he'll take ibuprofen prn that seems to help him sleep  Tylenol worsens nightmares/PTSD  Still getting counseling.     Taking 4 meds.  Does not know name of meds    No recent fills on lasix  [facility] not [facility]  But no other meds  Not taking lorazepam    Otherwise no complaints.  Feels well.        Patient Care Team:  Deshaun Goel MD as PCP - General (Internal Medicine)  Magnus Rashid MD as Consulting Physician (Dermatology)  Gillian Williamson MD as Consulting Physician (Dermatology)  Jae Weathers MA as Care Coordinator    Patient Active Problem List    Diagnosis Date Noted    Chronic kidney  disease, stage 3a 02/03/2023    Obstructive sleep apnea 7/18/22 HST with mod NATTY AHI 20      7/18/22 HST with mod NATTY AHI 20      Macrocytic anemia 01/28/2022    Abnormal glucose 07/26/2021    AK (actinic keratosis) 09/24/2020 6/2020 efudex/dovonex bid x 5 days      ARGUETA (dyspnea on exertion) 06/15/2020    Leg swelling 06/15/2020    Benzodiazepine dependence 01/15/2020    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                           There is 40 - 49% left Internal Carotid stenosis.   July 2011      Pure hypercholesterolemia 11/06/2012       PAST MEDICAL PROBLEMS, PAST SURGICAL HISTORY: please see relevant portions of the electronic medical record    ALLERGIES AND MEDICATIONS: updated and reviewed.  Review of patient's allergies indicates:   Allergen Reactions    Iodine and iodide containing products Anaphylaxis    Adhesive     Dye Other (See Comments)    Iodinated contrast media     Trazodone      dizziness    Keflex [cephalexin] Diarrhea and Nausea And Vomiting    Penicillins Rash       Medication List with  Changes/Refills   Current Medications    ASPIRIN (ECOTRIN) 81 MG EC TABLET    Take 81 mg by mouth once daily.     ATORVASTATIN (LIPITOR) 40 MG TABLET    Take 1 tablet (40 mg total) by mouth once daily.    CHOLECALCIFEROL, VITAMIN D3, (VITAMIN D3) 50 MCG (2,000 UNIT) TAB    Take 1 tablet by mouth once daily.    CLOTRIMAZOLE-BETAMETHASONE 1-0.05% (LOTRISONE) CREAM    APPLY TOPICALLY TO AFFECTED AREA 2 TIMES DAILY AS NEEDED  AREA    FUROSEMIDE (LASIX) 20 MG TABLET    Take 1 tablet (20 mg total) by mouth once daily.    LISINOPRIL 10 MG TABLET    Take 1 tablet (10 mg total) by mouth once daily.    METOPROLOL SUCCINATE (TOPROL-XL) 100 MG 24 HR TABLET    Take 1 tablet (100 mg total) by mouth once daily.    MULTIVITAMIN ORAL    Take 1 tablet by mouth once daily.    NITROGLYCERIN (NITROSTAT) 0.4 MG SL TABLET    Place 1 tablet under the tongue as needed.         Objective:   Physical Exam   Vitals:    02/06/23 1422   BP: 138/60   BP Location: Left arm   Patient Position: Sitting   BP Method: Large (Manual)   Pulse: 63   Temp: 97.9 °F (36.6 °C)   TempSrc: Oral   SpO2: 96%   Weight: 95.2 kg (209 lb 14.1 oz)   Height: 6' (1.829 m)    Body mass index is 28.46 kg/m².  Weight: 95.2 kg (209 lb 14.1 oz)   Height: 6' (182.9 cm)     Physical Exam  Constitutional:       General: He is not in acute distress.     Appearance: He is well-developed.   Eyes:      Extraocular Movements: Extraocular movements intact.   Cardiovascular:      Rate and Rhythm: Normal rate and regular rhythm.      Heart sounds: Normal heart sounds. No murmur heard.  Pulmonary:      Effort: Pulmonary effort is normal.      Breath sounds: Normal breath sounds.   Musculoskeletal:         General: Normal range of motion.      Right lower leg: Edema present.      Left lower leg: Edema present.      Comments: Mild edema ankles bilaterally.  Sensitive to moderate touch.   Skin:     General: Skin is warm and dry.   Neurological:      Mental Status: He is alert and  oriented to person, place, and time.      Coordination: Coordination normal.   Psychiatric:         Behavior: Behavior normal.         Thought Content: Thought content normal.       Component      Latest Ref Rng & Units 2/2/2023 7/18/2022   WBC      3.90 - 12.70 K/uL 8.25 7.95   RBC      4.60 - 6.20 M/uL 3.73 (L) 3.72 (L)   Hemoglobin      14.0 - 18.0 g/dL 12.1 (L) 12.5 (L)   Hematocrit      40.0 - 54.0 % 38.5 (L) 38.9 (L)   MCV      82 - 98 fL 103 (H) 105 (H)   MCH      27.0 - 31.0 pg 32.4 (H) 33.6 (H)   MCHC      32.0 - 36.0 g/dL 31.4 (L) 32.1   RDW      11.5 - 14.5 % 12.2 12.1   Platelets      150 - 450 K/uL 228 255   MPV      9.2 - 12.9 fL 13.8 (H) 13.5 (H)   Immature Granulocytes      0.0 - 0.5 % 0.4 0.3   Gran # (ANC)      1.8 - 7.7 K/uL 5.0 4.7   Immature Grans (Abs)      0.00 - 0.04 K/uL 0.03 0.02   Lymph #      1.0 - 4.8 K/uL 1.8 2.1   Mono #      0.3 - 1.0 K/uL 1.0 0.9   Eos #      0.0 - 0.5 K/uL 0.4 0.2   Baso #      0.00 - 0.20 K/uL 0.06 0.05   nRBC      0 /100 WBC 0 0   Gran %      38.0 - 73.0 % 59.9 58.6   Lymph %      18.0 - 48.0 % 22.1 25.8   Mono %      4.0 - 15.0 % 11.8 11.8   Eosinophil %      0.0 - 8.0 % 5.1 2.9   Basophil %      0.0 - 1.9 % 0.7 0.6   Differential Method       Automated Automated   Sodium      136 - 145 mmol/L 139 135 (L)   Potassium      3.5 - 5.1 mmol/L 5.2 (H) 4.9   Chloride      95 - 110 mmol/L 104 99   CO2      23 - 29 mmol/L 30 (H) 28   Glucose      70 - 110 mg/dL 107 106   BUN      8 - 23 mg/dL 21 22   Creatinine      0.5 - 1.4 mg/dL 1.0 1.1   Calcium      8.7 - 10.5 mg/dL 10.2 10.0   PROTEIN TOTAL      6.0 - 8.4 g/dL 7.1 7.0   Albumin      3.5 - 5.2 g/dL 3.8 3.9   BILIRUBIN TOTAL      0.1 - 1.0 mg/dL 0.7 0.9   Alkaline Phosphatase      55 - 135 U/L 95 87   AST      10 - 40 U/L 27 26   ALT      10 - 44 U/L 20 20   Anion Gap      8 - 16 mmol/L 5 (L) 8   eGFR if African American      >60 mL/min/1.73 m:2  >60.0   eGFR if non African American      >60 mL/min/1.73 m:2  59.2 (A)    eGFR      >60 mL/min/1.73 m:2 >60.0    Cholesterol      120 - 199 mg/dL  116 (L)   Triglycerides      30 - 150 mg/dL  130   HDL      40 - 75 mg/dL  35 (L)   LDL Cholesterol External      63.0 - 159.0 mg/dL  55.0 (L)   HDL/Cholesterol Ratio      20.0 - 50.0 %  30.2   Total Cholesterol/HDL Ratio      2.0 - 5.0  3.3   Non-HDL Cholesterol      mg/dL  81   Hemoglobin A1C External      4.0 - 5.6 % 5.7 (H) 5.8 (H)   Estimated Avg Glucose      68 - 131 mg/dL 117 120

## 2023-02-06 ENCOUNTER — OFFICE VISIT (OUTPATIENT)
Dept: FAMILY MEDICINE | Facility: CLINIC | Age: 88
End: 2023-02-06
Payer: MEDICARE

## 2023-02-06 VITALS
HEART RATE: 63 BPM | BODY MASS INDEX: 28.43 KG/M2 | HEIGHT: 72 IN | DIASTOLIC BLOOD PRESSURE: 60 MMHG | OXYGEN SATURATION: 96 % | WEIGHT: 209.88 LBS | TEMPERATURE: 98 F | SYSTOLIC BLOOD PRESSURE: 138 MMHG

## 2023-02-06 DIAGNOSIS — Z23 NEEDS FLU SHOT: ICD-10-CM

## 2023-02-06 DIAGNOSIS — N18.31 CHRONIC KIDNEY DISEASE, STAGE 3A: ICD-10-CM

## 2023-02-06 DIAGNOSIS — F13.20 BENZODIAZEPINE DEPENDENCE: ICD-10-CM

## 2023-02-06 DIAGNOSIS — R73.09 ABNORMAL GLUCOSE: ICD-10-CM

## 2023-02-06 DIAGNOSIS — I10 ESSENTIAL HYPERTENSION: Primary | ICD-10-CM

## 2023-02-06 DIAGNOSIS — I65.23 BILATERAL CAROTID ARTERY STENOSIS: ICD-10-CM

## 2023-02-06 DIAGNOSIS — E78.2 MIXED HYPERLIPIDEMIA: ICD-10-CM

## 2023-02-06 DIAGNOSIS — I25.10 CORONARY ARTERY DISEASE INVOLVING NATIVE CORONARY ARTERY OF NATIVE HEART WITHOUT ANGINA PECTORIS: ICD-10-CM

## 2023-02-06 DIAGNOSIS — E78.00 PURE HYPERCHOLESTEROLEMIA: ICD-10-CM

## 2023-02-06 DIAGNOSIS — D53.9 MACROCYTIC ANEMIA: ICD-10-CM

## 2023-02-06 DIAGNOSIS — G62.9 SENSORIMOTOR NEUROPATHY: ICD-10-CM

## 2023-02-06 PROBLEM — Z87.898 HISTORY OF BENZODIAZEPINE USE: Status: ACTIVE | Noted: 2020-01-15

## 2023-02-06 PROCEDURE — 99214 PR OFFICE/OUTPT VISIT, EST, LEVL IV, 30-39 MIN: ICD-10-PCS | Mod: S$PBB,,, | Performed by: INTERNAL MEDICINE

## 2023-02-06 PROCEDURE — 99999 PR PBB SHADOW E&M-EST. PATIENT-LVL III: ICD-10-PCS | Mod: PBBFAC,,, | Performed by: INTERNAL MEDICINE

## 2023-02-06 PROCEDURE — 99213 OFFICE O/P EST LOW 20 MIN: CPT | Mod: PBBFAC,PO,25 | Performed by: INTERNAL MEDICINE

## 2023-02-06 PROCEDURE — 99214 OFFICE O/P EST MOD 30 MIN: CPT | Mod: S$PBB,,, | Performed by: INTERNAL MEDICINE

## 2023-02-06 PROCEDURE — 99999 PR PBB SHADOW E&M-EST. PATIENT-LVL III: CPT | Mod: PBBFAC,,, | Performed by: INTERNAL MEDICINE

## 2023-02-06 PROCEDURE — G0008 ADMIN INFLUENZA VIRUS VAC: HCPCS | Mod: PBBFAC,PO

## 2023-02-06 RX ORDER — LISINOPRIL 10 MG/1
10 TABLET ORAL DAILY
Qty: 90 TABLET | Refills: 3 | Status: SHIPPED | OUTPATIENT
Start: 2023-02-06 | End: 2024-02-26 | Stop reason: SDUPTHER

## 2023-02-06 RX ORDER — METOPROLOL SUCCINATE 100 MG/1
100 TABLET, EXTENDED RELEASE ORAL DAILY
Qty: 90 TABLET | Refills: 3 | Status: SHIPPED | OUTPATIENT
Start: 2023-02-06 | End: 2024-02-26 | Stop reason: SDUPTHER

## 2023-02-06 RX ORDER — ATORVASTATIN CALCIUM 40 MG/1
40 TABLET, FILM COATED ORAL DAILY
Qty: 90 TABLET | Refills: 3 | Status: SHIPPED | OUTPATIENT
Start: 2023-02-06 | End: 2024-02-26 | Stop reason: SDUPTHER

## 2023-02-06 RX ORDER — FUROSEMIDE 20 MG/1
20 TABLET ORAL DAILY
Qty: 90 TABLET | Refills: 3 | Status: SHIPPED | OUTPATIENT
Start: 2023-02-06 | End: 2024-02-26 | Stop reason: ALTCHOICE

## 2023-05-30 ENCOUNTER — PES CALL (OUTPATIENT)
Dept: ADMINISTRATIVE | Facility: CLINIC | Age: 88
End: 2023-05-30
Payer: MEDICARE

## 2023-08-01 ENCOUNTER — LAB VISIT (OUTPATIENT)
Dept: LAB | Facility: HOSPITAL | Age: 88
End: 2023-08-01
Attending: INTERNAL MEDICINE
Payer: MEDICARE

## 2023-08-01 ENCOUNTER — TELEPHONE (OUTPATIENT)
Dept: FAMILY MEDICINE | Facility: CLINIC | Age: 88
End: 2023-08-01
Payer: MEDICARE

## 2023-08-01 DIAGNOSIS — E78.00 PURE HYPERCHOLESTEROLEMIA: ICD-10-CM

## 2023-08-01 DIAGNOSIS — D53.9 MACROCYTIC ANEMIA: ICD-10-CM

## 2023-08-01 DIAGNOSIS — R73.09 ABNORMAL GLUCOSE: ICD-10-CM

## 2023-08-01 DIAGNOSIS — N18.31 CHRONIC KIDNEY DISEASE, STAGE 3A: ICD-10-CM

## 2023-08-01 LAB
ALBUMIN SERPL BCP-MCNC: 3.9 G/DL (ref 3.5–5.2)
ALP SERPL-CCNC: 84 U/L (ref 55–135)
ALT SERPL W/O P-5'-P-CCNC: 18 U/L (ref 10–44)
ANION GAP SERPL CALC-SCNC: 8 MMOL/L (ref 8–16)
AST SERPL-CCNC: 27 U/L (ref 10–40)
BASOPHILS # BLD AUTO: 0.05 K/UL (ref 0–0.2)
BASOPHILS NFR BLD: 0.6 % (ref 0–1.9)
BILIRUB SERPL-MCNC: 0.8 MG/DL (ref 0.1–1)
BUN SERPL-MCNC: 31 MG/DL (ref 8–23)
CALCIUM SERPL-MCNC: 10.1 MG/DL (ref 8.7–10.5)
CHLORIDE SERPL-SCNC: 105 MMOL/L (ref 95–110)
CHOLEST SERPL-MCNC: 130 MG/DL (ref 120–199)
CHOLEST/HDLC SERPL: 3.1 {RATIO} (ref 2–5)
CO2 SERPL-SCNC: 23 MMOL/L (ref 23–29)
CREAT SERPL-MCNC: 1 MG/DL (ref 0.5–1.4)
DIFFERENTIAL METHOD: ABNORMAL
EOSINOPHIL # BLD AUTO: 0.2 K/UL (ref 0–0.5)
EOSINOPHIL NFR BLD: 2.4 % (ref 0–8)
ERYTHROCYTE [DISTWIDTH] IN BLOOD BY AUTOMATED COUNT: 12.4 % (ref 11.5–14.5)
EST. GFR  (NO RACE VARIABLE): >60 ML/MIN/1.73 M^2
ESTIMATED AVG GLUCOSE: 111 MG/DL (ref 68–131)
GLUCOSE SERPL-MCNC: 92 MG/DL (ref 70–110)
HBA1C MFR BLD: 5.5 % (ref 4–5.6)
HCT VFR BLD AUTO: 34.5 % (ref 40–54)
HDLC SERPL-MCNC: 42 MG/DL (ref 40–75)
HDLC SERPL: 32.3 % (ref 20–50)
HGB BLD-MCNC: 11.4 G/DL (ref 14–18)
IMM GRANULOCYTES # BLD AUTO: 0.02 K/UL (ref 0–0.04)
IMM GRANULOCYTES NFR BLD AUTO: 0.2 % (ref 0–0.5)
LDLC SERPL CALC-MCNC: 74 MG/DL (ref 63–159)
LYMPHOCYTES # BLD AUTO: 2 K/UL (ref 1–4.8)
LYMPHOCYTES NFR BLD: 23.5 % (ref 18–48)
MCH RBC QN AUTO: 33.2 PG (ref 27–31)
MCHC RBC AUTO-ENTMCNC: 33 G/DL (ref 32–36)
MCV RBC AUTO: 101 FL (ref 82–98)
MONOCYTES # BLD AUTO: 0.9 K/UL (ref 0.3–1)
MONOCYTES NFR BLD: 10.6 % (ref 4–15)
NEUTROPHILS # BLD AUTO: 5.2 K/UL (ref 1.8–7.7)
NEUTROPHILS NFR BLD: 62.7 % (ref 38–73)
NONHDLC SERPL-MCNC: 88 MG/DL
NRBC BLD-RTO: 0 /100 WBC
PLATELET # BLD AUTO: 227 K/UL (ref 150–450)
PMV BLD AUTO: 13.7 FL (ref 9.2–12.9)
POTASSIUM SERPL-SCNC: 4.6 MMOL/L (ref 3.5–5.1)
PROT SERPL-MCNC: 6.8 G/DL (ref 6–8.4)
RBC # BLD AUTO: 3.43 M/UL (ref 4.6–6.2)
SODIUM SERPL-SCNC: 136 MMOL/L (ref 136–145)
TRIGL SERPL-MCNC: 70 MG/DL (ref 30–150)
WBC # BLD AUTO: 8.37 K/UL (ref 3.9–12.7)

## 2023-08-01 PROCEDURE — 36415 COLL VENOUS BLD VENIPUNCTURE: CPT | Mod: PO | Performed by: INTERNAL MEDICINE

## 2023-08-01 PROCEDURE — 80061 LIPID PANEL: CPT | Performed by: INTERNAL MEDICINE

## 2023-08-01 PROCEDURE — 80053 COMPREHEN METABOLIC PANEL: CPT | Performed by: INTERNAL MEDICINE

## 2023-08-01 PROCEDURE — 83036 HEMOGLOBIN GLYCOSYLATED A1C: CPT | Performed by: INTERNAL MEDICINE

## 2023-08-01 PROCEDURE — 85025 COMPLETE CBC W/AUTO DIFF WBC: CPT | Performed by: INTERNAL MEDICINE

## 2023-08-01 NOTE — TELEPHONE ENCOUNTER
----- Message from Urvashi Bonds sent at 8/1/2023  9:41 AM CDT -----  Regarding: Lab Orders  .Type: Lab    Caller is requesting to schedule their Lab appointment prior to annual appointment.    Order is not listed in EPIC.  Please enter order and contact patient to schedule.    Name of Caller:self     Preferred Date and Time of Labs: before scheduled appointment     Date of EPP Appointment:    Where would they like the lab performed? [place]    Would the patient rather a call back or a response via My [facility]? call    Best Call Back Number: .[phone]     Additional Information:

## 2023-08-02 ENCOUNTER — PES CALL (OUTPATIENT)
Dept: ADMINISTRATIVE | Facility: CLINIC | Age: 88
End: 2023-08-02
Payer: MEDICARE

## 2023-08-03 ENCOUNTER — HOSPITAL ENCOUNTER (EMERGENCY)
Facility: HOSPITAL | Age: 88
Discharge: HOME | End: 2023-08-03
Attending: EMERGENCY MEDICINE
Payer: MEDICARE

## 2023-08-03 VITALS
HEIGHT: 72 IN | HEART RATE: 94 BPM | DIASTOLIC BLOOD PRESSURE: 61 MMHG | SYSTOLIC BLOOD PRESSURE: 134 MMHG | BODY MASS INDEX: 26.82 KG/M2 | RESPIRATION RATE: 18 BRPM | TEMPERATURE: 99 F | OXYGEN SATURATION: 98 % | WEIGHT: 198 LBS

## 2023-08-03 DIAGNOSIS — R05.9 COUGH: ICD-10-CM

## 2023-08-03 DIAGNOSIS — U07.1 COVID-19: Primary | ICD-10-CM

## 2023-08-03 LAB
CTP QC/QA: YES
INFLUENZA A ANTIGEN, POC: NEGATIVE
INFLUENZA B ANTIGEN, POC: NEGATIVE
SARS-COV-2 RDRP RESP QL NAA+PROBE: POSITIVE

## 2023-08-03 PROCEDURE — 87804 INFLUENZA ASSAY W/OPTIC: CPT | Mod: ER

## 2023-08-03 PROCEDURE — 99283 EMERGENCY DEPT VISIT LOW MDM: CPT | Mod: 25,ER

## 2023-08-03 PROCEDURE — 87635 SARS-COV-2 COVID-19 AMP PRB: CPT | Mod: ER | Performed by: EMERGENCY MEDICINE

## 2023-08-03 NOTE — ED PROVIDER NOTES
Encounter Date: 8/3/2023       History     Chief Complaint   Patient presents with    URI     Fever, chills, fatigue, sinus, headache, onset last night     CC:  URI    HPI:  This is a 89-year-old male with coronary artery disease, hyperlipidemia, hypertension presenting to the ED for evaluation of body aches, chills, cough.  Symptoms began last night.  He denies chest pain or shortness of breath.  Daughter is at the bedside.    The history is provided by the patient. No  was used.     Review of patient's allergies indicates:   Allergen Reactions    Iodine and iodide containing products Anaphylaxis    Adhesive     Dye Other (See Comments)    Iodinated contrast media     Trazodone      dizziness    Keflex [cephalexin] Diarrhea and Nausea And Vomiting    Penicillins Rash    Tylenol [acetaminophen] Other (See Comments)     Triggers PTSD/nightmares     Past Medical History:   Diagnosis Date    Allergy     seasonal    Arthritis     Basal cell carcinoma 10 years    right ear     Basal cell carcinoma 08/24/2017    Left earlobe     Basal cell carcinoma 11/14/2020    Left wrist    Basal cell carcinoma 05/18/2020    left nasal tip    CAD (coronary artery disease) 11/29/2012    Hyperlipidemia     Hypertension     Joint pain     PTSD (post-traumatic stress disorder)     Squamous cell carcinoma 06/26/2017    Squamous cell carcinoma of skin 10/2019    right cheek (cryosurgery)      Past Surgical History:   Procedure Laterality Date    CATARACT EXTRACTION      CORONARY ARTERY BYPASS GRAFT       Family History   Problem Relation Age of Onset    Skin cancer Mother     Leukemia Father     Diabetes Sister     Peripheral vascular disease Sister     Cancer Brother     No Known Problems Daughter     No Known Problems Daughter     No Known Problems Daughter     No Known Problems Daughter     No Known Problems Son     No Known Problems Son     Melanoma Neg Hx      Social History     Tobacco Use    Smoking status: Former      Current packs/day: 0.00    Smokeless tobacco: Never   Substance Use Topics    Alcohol use: Yes     Comment: Red wine daily    Drug use: No     Review of Systems   Constitutional:  Positive for chills. Negative for fever.   HENT:  Positive for congestion. Negative for sore throat.    Respiratory:  Positive for cough. Negative for shortness of breath.    Cardiovascular:  Negative for chest pain.   Gastrointestinal:  Negative for abdominal pain, nausea and vomiting.   Genitourinary:  Negative for dysuria.   Musculoskeletal:  Negative for back pain.   Skin:  Negative for rash.   Neurological:  Positive for headaches. Negative for weakness.   Hematological:  Does not bruise/bleed easily.       Physical Exam     Initial Vitals [08/03/23 0941]   BP Pulse Resp Temp SpO2   (!) 148/64 107 20 98.9 °F (37.2 °C) 95 %      MAP       --         Physical Exam    Vitals reviewed.  Constitutional: He appears well-developed and well-nourished. He is not diaphoretic.  Non-toxic appearance. He does not have a sickly appearance. He does not appear ill. No distress.   Non-toxic and well appearing.   HENT:   Head: Normocephalic and atraumatic.   Right Ear: External ear normal.   Left Ear: External ear normal.   Neck:   Normal range of motion.  Cardiovascular:  Normal rate, regular rhythm, normal heart sounds and intact distal pulses.           Pulmonary/Chest: Breath sounds normal. No respiratory distress.   Speaking in full and clear sentences without difficulty.    Abdominal: Abdomen is soft. Bowel sounds are normal. There is no abdominal tenderness.   Musculoskeletal:         General: Normal range of motion.      Cervical back: Normal range of motion.     Neurological: He is alert and oriented to person, place, and time. GCS eye subscore is 4. GCS verbal subscore is 5. GCS motor subscore is 6.   Skin: Skin is warm, dry and intact. No rash noted. No erythema.   Psychiatric: He has a normal mood and affect. Thought content normal.          ED Course   Procedures  Labs Reviewed   SARS-COV-2 RDRP GENE - Abnormal; Notable for the following components:       Result Value    POC Rapid COVID Positive (*)     All other components within normal limits    Narrative:     This test utilizes isothermal nucleic acid amplification technology to detect the SARS-CoV-2 RdRp nucleic acid segment. The analytical sensitivity (limit of detection) is 500 copies/swab.     A POSITIVE result is indicative of the presence of SARS-CoV-2 RNA; clinical correlation with patient history and other diagnostic information is necessary to determine patient infection status.    A NEGATIVE result means that SARS-CoV-2 nucleic acids are not present above the limit of detection. A NEGATIVE result should be treated as presumptive. It does not rule out the possibility of COVID-19 and should not be the sole basis for treatment decisions. If COVID-19 is strongly suspected based on clinical and exposure history, re-testing using an alternate molecular assay should be considered.     This test is only for use under the Food and Drug Administration s Emergency Use Authorization (EUA).     Commercial kits are provided by [employer]. Performance characteristics of the EUA have been independently verified by [facility] Department of Pathology and Laboratory Medicine.   _________________________________________________________________   The authorized Fact Sheet for Healthcare Providers and the authorized Fact Sheet for Patients of the ID NOW COVID-19 are available on the FDA website:    https://www.fda.gov/media/490027/download      https://www.fda.gov/media/593939/download      POCT INFLUENZA A/B MOLECULAR   POCT RAPID INFLUENZA A/B          Imaging Results              X-Ray Chest PA And Lateral (Final result)  Result time 08/03/23 12:01:08      Final result by Marc Green MD (08/03/23 12:01:08)                   Impression:      No acute  abnormality.      Electronically signed by: Marc Green MD  Date:    08/03/2023  Time:    12:01               Narrative:    EXAMINATION:  XR CHEST PA AND LATERAL    CLINICAL HISTORY:  Cough, unspecified    TECHNIQUE:  PA and lateral views of the chest were performed.    COMPARISON:  Chest radiograph dated February 12, 2011    FINDINGS:  Patient is status post median sternotomy.  The trachea and cardiomediastinal silhouette remains stable.  There is no evidence of pleural effusions, pneumothoraces or consolidations.  Lungs are clear.  Osseous structures demonstrate no evidence for acute fractures or dislocations.                                       Medications - No data to display  Medical Decision Making:   ED Management:  This is an evaluation of a 89 y.o. male that presents to the Emergency Department for URI symptoms. The patient is a non-toxic, afebrile, and well appearing male. On physical exam ears and pharynx are without evidence of infection. Appears well hydrated with moist mucus membranes. Neck soft and supple with no meningeal signs or cervical lymphadenopathy. Breath sounds are clear and equal bilaterally with no adventitious breath sounds, tachypnea or respiratory distress with room air pulse ox of 98% and no evidence of hypoxia.     Vital Signs Are Reassuring.  RESULTS:   FLU negative.  COVID is positive.    Discussed tx with EUA drug Molnupiravir with patient and his daughter.  They would like to receive a prescription.  Did offer to place IV and check blood work, however patient declined as he just had blood work done 2 days ago.  He is very well-appearing and nondistressed.  No hypoxia.  Ambulatory saturation greater than 95% on room air.    I considered, but at this time, do not suspect OM, OE, strep pharyngitis, meningitis, pneumonia, or acute bacterial sinusitis.    The diagnosis, treatment plan, instructions for follow-up and reevaluation with PCP as well as ED return precautions were  discussed and understanding was verbalized. All questions or concerns have been addressed.              ED Course as of 08/03/23 1425   Thu Aug 03, 2023   1021 SARS-CoV-2 RNA, Amplification, Qual(!): Positive [MT]      ED Course User Index  [MT] Maricel Mendes NP                 Clinical Impression:   Final diagnoses:  [R05.9] Cough  [U07.1] COVID-19 (Primary)        ED Disposition Condition    Discharge Stable          ED Prescriptions       Medication Sig Dispense Start Date End Date Auth. Provider    molnupiravir 200 mg capsule (EUA) Take 4 capsules (800 mg total) by mouth every 12 (twelve) hours. for 5 days 40 capsule 8/3/2023 8/8/2023 Maricel Mendes NP          Follow-up Information       Follow up With Specialties Details Why Contact Info    Deshaun Goel MD Internal Medicine Schedule an appointment as soon as possible for a visit  For follow-up [address] [facility] [address]  [phone]      [facility] Emergency Medicine Go to  If symptoms worsen [address] [facility] [address] [phone]             Maricel Mendes NP  08/03/23 1423

## 2023-08-03 NOTE — DISCHARGE INSTRUCTIONS
Thank you for coming to our Emergency Department today. It is important to remember that some problems or medical conditions are difficult to diagnose and may not be found during your Emergency Department visit.     Be sure to follow up with your primary care doctor and review all labs/imaging/tests that were performed during your ER visit with them. Some labs/tests may be outside of the normal range and require non-emergent follow-up and further investigation to help diagnose/exclude/prevent complications or other potentially serious medical conditions that were not addressed during your ER visit.    If you do not have a primary care doctor, you may contact the one listed on your discharge paperwork or you may also call the [facility] Appointment Desk at [phone] to schedule an appointment and establish care with one. It is important to your health that you have a primary care doctor.    Please take all medications as directed. All medications may potentially have side-effects and it is impossible to predict which medications may give you side-effects or what side-effects (if any) they will give you.. If you feel that you are having a negative effect or side-effect of any medication you should immediately stop taking them and seek medical attention. If you feel that you are having a life-threatening reaction call 911.    Return to the ER with any questions/concerns, new/concerning symptoms, worsening or failure to improve.     Do not drive, swim, climb to height, take a bath, operate heavy machinery, drink alcohol or take potentially sedating medications, sign any legal documents or make any important decisions for 24 hours if you have received any pain medications, sedatives or mood altering drugs during your ER visit or within 24 hours of taking them if they have been prescribed to you.     You can find additional resources for Dentists, hearing aids, durable medical equipment, low cost pharmacies and  other resources at https://[facility].org    BELOW THIS LINE ONLY APPLIES IF YOU HAVE A COVID TEST PENDING OR IF YOU HAVE BEEN DIAGNOSED WITH COVID:  Please access [facility] to review the results of your test. Until the results of your COVID test return, you should isolate yourself so as not to potentially spread illness to others.   If your COVID test returns positive, you should isolate yourself so as not to spread illness to others. After five full days, if you are feeling better and you have not had fever for 24 hours, you can return to your typical daily activities, but you must wear a mask around others for an additional 5 days.   If your COVID test returns negative and you are either unvaccinated or more than six months out from your two-dose vaccine and are not yet boosted, you should still quarantine for 5 full days followed by strict mask use for an additional 5 full days.   If your COVID test returns negative and you have received your 2-dose initial vaccine as well as a booster, you should continue strict mask use for 10 full days after the exposure.  For all those exposed, best practice includes a test at day 5 after the exposure. This can be a home test or a test through one of the many testing centers throughout our community.   Masking is always advised to limit the spread of COVID. Cdc.gov is an excellent site to obtain the latest up to date recommendations regarding COVID and COVID testing.     CDC Testing and Quarantine Guidelines for patients with exposure to a known-positive COVID-19 person:  A close exposure is defined as anyone who has had an exposure (masked or unmasked) to a known COVID -19 positive person within 6 feet of someone for a cumulative total of 15 minutes or more over a 24-hour period.   Vaccinated and/or if you recently had a positive covid test within 90 days do NOT need to quarantine after contact with someone who had COVID-19 unless you develop symptoms.   Fully vaccinated  people who have not had a positive test within 90 days, should get tested 3-5 days after their exposure, even if they don't have symptoms and wear a mask indoors in public for 14 days following exposure or until their test result is negative.      Unvaccinated and/or NOT had a positive test within 90 days and meet close exposure  You are required by CDC guidelines to quarantine for at least 5 days from time of exposure followed by 5 days of strict masking. It is recommended, but not required to test after 5 days, unless you develop symptoms, in which case you should test at that time.  If you get tested after 5 days and your test is positive, your 5 day period of isolation starts the day of the positive test.    If your exposure does not meet the above definition, you can return to your normal daily activities to include social distancing, wearing a mask and frequent handwashing.      Here is a link to guidance from the CDC:  https://www.cdc.gov/media/releases/2021/s1227-isolation-quarantine-guidance.html      [place] Dept Of Health Testing Sites:  https://ldh.la.gov/page/3934      [facility] website with testing locations and guidance:  https://www.[facility].org/selfcare

## 2023-08-07 ENCOUNTER — TELEPHONE (OUTPATIENT)
Dept: FAMILY MEDICINE | Facility: CLINIC | Age: 88
End: 2023-08-07
Payer: MEDICARE

## 2023-08-07 NOTE — TELEPHONE ENCOUNTER
----- Message from Iva Lang MA sent at 8/7/2023 11:26 AM CDT -----    ----- Message -----  From: Urvashi Bonds  Sent: 8/7/2023  11:22 AM CDT  To: [facility] Self Staff    .Type: Patient Call Back    Who called:self    What is the request in detail: patient requesting a callback regarding appointment tomorrow    Can the clinic reply by [facility]? call    Would the patient rather a call back or a response via [facility]? call    Best call back number: [phone]     Additional Information:

## 2023-08-07 NOTE — TELEPHONE ENCOUNTER
Pt was called to give the provider's message. Pt states that he feels better but he will like to be seen sometime next week. I do not  have override privileges.Please advise further.

## 2023-08-07 NOTE — TELEPHONE ENCOUNTER
Spoke with patient. Patient states he was just at the Ed for covid. Patient states he is still not feeling good. Patient wants to know if he should reschedule the appointment for tomorrow or come in to the clinic.

## 2023-08-07 NOTE — TELEPHONE ENCOUNTER
If he is feeling better, just not  100%, he can reschedule  If he is not feeling good and wants to be re-evaluated, keep the appointment.

## 2023-08-14 NOTE — PROGRESS NOTES
This note was created by combination of typed  and M-Modal dictation.  Transcription errors may be present.  If there are any questions, please contact me.    Assessment and Plan:   Assessment and Plan   Incontinence of feces with fecal urgency  -a few episodes of urge fecal incontinence.  Most of the time no issues.  Not associated with diarrhea.  Recommended Kegels.    Dyslipidemia  Coronary artery disease involving native coronary artery of native heart without angina pectoris  Bilateral carotid artery stenosis  -pre visit labs good on statin no changes.   -     Lipid Panel; Future; Expected date: 02/10/2024    Chronic kidney disease, stage 3a  -pre visit labs eGFR > 60.  monitor  -     Comprehensive Metabolic Panel; Future; Expected date: 02/10/2024  -     Lipid Panel; Future; Expected date: 02/10/2024  -     CBC Without Differential; Future; Expected date: 02/14/2024    Abnormal glucose  -a1c good pre visit.  Gradual weight loss over the years, purposeful.   -     Comprehensive Metabolic Panel; Future; Expected date: 02/10/2024  -     Hemoglobin A1C; Future; Expected date: 02/10/2024  -     CBC Without Differential; Future; Expected date: 02/14/2024    PTSD (post-traumatic stress disorder); short temper; avoids crowds. [facility]  History of benzodiazepine use; self discontinued  -stable off of BZD.        Medications Discontinued During This Encounter   Medication Reason    molnupiravir 200 mg capsule (EUA)        meds sent this encounter:         Follow Up: OV 6 months with labs  Future Appointments   Date Time Provider Department Center   9/5/2023  2:00 PM Gillian Williamson MD [facility] DER[facility] [place]   2/8/2024  9:15 AM LAB, [facility] [place]   2/15/2024  2:20 PM Deshaun Goel MD [facility] MED [place]          Subjective:   Subjective   Chief Complaint   Patient presents with    Hypertension     Follow up        HPI  Aleks is a 89 y.o. male.     Social History     Tobacco Use     Smoking status: Former     Current packs/day: 0.00    Smokeless tobacco: Never   Substance Use Topics    Alcohol use: Yes     Comment: Red wine daily      Social History     Occupational History    Occupation: 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  .  Six kids. [place] [employer].  [employer] [place]         Social History     Social History Narrative     x 60 yr.  [place] [employer] [place]         Last appointment with this clinic was Visit date not found. Last visit with me 2/6/2023   To summarize last visit and events leading up to today:  Hypertension, CKD stage IIIA, hyperlipidemia, abnormal glucose, stable   Macrocytic anemia with alcohol use.  At the time he noted he had stop alcohol maybe for 5 months ago.    NATTY was agreeable to try CPAP with nasal pillow.  Eczema  area.  Lotrisone.    Benzodiazepine dependence.  Stable off of lorazepam  Frostbite with neuropathy.  [place] .      2/2023 labs  CBC macrocytic anemia stable  CMP potassium mildly elevated creatinine 1.0  A1c 5.7 from previous 5.8    Pre visit labs  CBC macrocytic anemia  CMP WNL  Lipid WNL  A1c 5.5 from 5.7    8/3/23 covid positive    Today's visit:  Please note: Chronic medical problems that are stable but are being addressed at this visit may not be listed/documented here, but may be addressed in more detail in the Assessment and Plan section.   Below are salient features of chronic medical conditions, or new/acute conditions and their details.    He and wife got covid at the same time  He's mostly over it   But still some fatigue/weakness residual.     Notes involuntary BMs.  In the past 6 months occurred 3 times  Wears adult diaper.    It's a normal BM, not diarrhea  He'll get the bowel urgency and then it's too late  Normally has BMs and is aware that he has to have a BM and has ample warning. Just the 3 times    No saddle dysesthesia  Notes polyuria and nocturia.    No leg weakness.     Dminished proprioception.  From frostbite. And  so is a trip hazard    Less snacking. Weight loss. 30 pounds weight loss over 4 years.    No longer drinking alcohol  And cut out sweets    NATTY.  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                           There is 40 - 49% left Internal  Carotid stenosis.   July 2011      Dyslipidemia 11/06/2012       PAST MEDICAL PROBLEMS, PAST SURGICAL HISTORY: please see relevant portions of the electronic medical record    ALLERGIES AND MEDICATIONS: updated and reviewed.  Medication List with Changes/Refills   Current Medications    ASPIRIN (ECOTRIN) 81 MG EC TABLET    Take 81 mg by mouth once daily.     ATORVASTATIN (LIPITOR) 40 MG TABLET    Take 1 tablet (40 mg total) by mouth once daily.    CHOLECALCIFEROL, VITAMIN D3, (VITAMIN D3) 50 MCG (2,000 UNIT) TAB    Take 1 tablet by mouth once daily.    CLOTRIMAZOLE-BETAMETHASONE 1-0.05% (LOTRISONE) CREAM    APPLY TOPICALLY TO AFFECTED AREA 2 TIMES DAILY AS NEEDED  AREA    FUROSEMIDE (LASIX) 20 MG TABLET    Take 1 tablet (20 mg total) by mouth once daily.    LISINOPRIL 10 MG TABLET    Take 1 tablet (10 mg total) by mouth once daily.    METOPROLOL SUCCINATE (TOPROL-XL) 100 MG 24 HR TABLET    Take 1 tablet (100 mg total) by mouth once daily.    MULTIVITAMIN ORAL    Take 1 tablet by mouth once daily.    NITROGLYCERIN (NITROSTAT) 0.4 MG SL TABLET    Place 1 tablet under the tongue as needed.          Objective:   Objective   Physical Exam   Vitals:    08/15/23 1356   BP: (!) 106/52   Pulse: 76   Temp: 98.2 °F (36.8 °C)   TempSrc: Oral   SpO2: 95%   Weight: 89.8 kg (197 lb 15.6 oz)   Height: 6' (1.829 m)    Body mass index is 26.85 kg/m².  Weight: 89.8 kg (197 lb 15.6 oz)   Height: 6' (182.9 cm)     Physical Exam  Constitutional:       General: He is not in acute distress.     Appearance: He is well-developed.   Eyes:      Extraocular Movements: Extraocular movements intact.   Cardiovascular:      Rate and Rhythm: Normal rate and regular rhythm.      Heart sounds: Normal heart sounds. No murmur heard.  Pulmonary:      Effort: Pulmonary effort is normal.      Breath sounds: Normal breath sounds.   Musculoskeletal:         General: Normal range of motion.      Right lower leg: Edema present.      Left lower leg: Edema  present.      Comments: Slight edema bilaterally   Skin:     General: Skin is warm and dry.   Neurological:      Mental Status: He is alert and oriented to person, place, and time.      Coordination: Coordination normal.   Psychiatric:         Behavior: Behavior normal.         Thought Content: Thought content normal.         Component      Latest Ref Rng & Units 8/1/2023 2/2/2023   WBC      3.90 - 12.70 K/uL 8.37 8.25   RBC      4.60 - 6.20 M/uL 3.43 (L) 3.73 (L)   Hemoglobin      14.0 - 18.0 g/dL 11.4 (L) 12.1 (L)   Hematocrit      40.0 - 54.0 % 34.5 (L) 38.5 (L)   MCV      82 - 98 fL 101 (H) 103 (H)   MCH      27.0 - 31.0 pg 33.2 (H) 32.4 (H)   MCHC      32.0 - 36.0 g/dL 33.0 31.4 (L)   RDW      11.5 - 14.5 % 12.4 12.2   Platelets      150 - 450 K/uL 227 228   MPV      9.2 - 12.9 fL 13.7 (H) 13.8 (H)   Immature Granulocytes      0.0 - 0.5 % 0.2 0.4   Gran # (ANC)      1.8 - 7.7 K/uL 5.2 5.0   Immature Grans (Abs)      0.00 - 0.04 K/uL 0.02 0.03   Lymph #      1.0 - 4.8 K/uL 2.0 1.8   Mono #      0.3 - 1.0 K/uL 0.9 1.0   Eos #      0.0 - 0.5 K/uL 0.2 0.4   Baso #      0.00 - 0.20 K/uL 0.05 0.06   nRBC      0 /100 WBC 0 0   Gran %      38.0 - 73.0 % 62.7 59.9   Lymph %      18.0 - 48.0 % 23.5 22.1   Mono %      4.0 - 15.0 % 10.6 11.8   Eosinophil %      0.0 - 8.0 % 2.4 5.1   Basophil %      0.0 - 1.9 % 0.6 0.7   Differential Method       Automated Automated   Sodium      136 - 145 mmol/L 136 139   Potassium      3.5 - 5.1 mmol/L 4.6 5.2 (H)   Chloride      95 - 110 mmol/L 105 104   CO2      23 - 29 mmol/L 23 30 (H)   Glucose      70 - 110 mg/dL 92 107   BUN      8 - 23 mg/dL 31 (H) 21   Creatinine      0.5 - 1.4 mg/dL 1.0 1.0   Calcium      8.7 - 10.5 mg/dL 10.1 10.2   PROTEIN TOTAL      6.0 - 8.4 g/dL 6.8 7.1   Albumin      3.5 - 5.2 g/dL 3.9 3.8   BILIRUBIN TOTAL      0.1 - 1.0 mg/dL 0.8 0.7   Alkaline Phosphatase      55 - 135 U/L 84 95   AST      10 - 40 U/L 27 27   ALT      10 - 44 U/L 18 20   Anion Gap      8  - 16 mmol/L 8 5 (L)   eGFR      >60 mL/min/1.73 m:2 >60.0 >60.0   Cholesterol      120 - 199 mg/dL 130    Triglycerides      30 - 150 mg/dL 70    HDL      40 - 75 mg/dL 42    LDL Cholesterol External      63.0 - 159.0 mg/dL 74.0    HDL/Cholesterol Ratio      20.0 - 50.0 % 32.3    Total Cholesterol/HDL Ratio      2.0 - 5.0 3.1    Non-HDL Cholesterol      mg/dL 88    Hemoglobin A1C External      4.0 - 5.6 % 5.5 5.7 (H)   Estimated Avg Glucose      68 - 131 mg/dL 111 117

## 2023-08-15 ENCOUNTER — OFFICE VISIT (OUTPATIENT)
Dept: FAMILY MEDICINE | Facility: CLINIC | Age: 88
End: 2023-08-15
Payer: MEDICARE

## 2023-08-15 VITALS
DIASTOLIC BLOOD PRESSURE: 52 MMHG | HEIGHT: 72 IN | BODY MASS INDEX: 26.82 KG/M2 | WEIGHT: 198 LBS | SYSTOLIC BLOOD PRESSURE: 106 MMHG | TEMPERATURE: 98 F | OXYGEN SATURATION: 95 % | HEART RATE: 76 BPM

## 2023-08-15 DIAGNOSIS — I65.23 BILATERAL CAROTID ARTERY STENOSIS: ICD-10-CM

## 2023-08-15 DIAGNOSIS — R73.09 ABNORMAL GLUCOSE: ICD-10-CM

## 2023-08-15 DIAGNOSIS — I25.10 CORONARY ARTERY DISEASE INVOLVING NATIVE CORONARY ARTERY OF NATIVE HEART WITHOUT ANGINA PECTORIS: ICD-10-CM

## 2023-08-15 DIAGNOSIS — Z87.898 HISTORY OF BENZODIAZEPINE USE: ICD-10-CM

## 2023-08-15 DIAGNOSIS — E78.5 DYSLIPIDEMIA: ICD-10-CM

## 2023-08-15 DIAGNOSIS — F43.10 PTSD (POST-TRAUMATIC STRESS DISORDER): ICD-10-CM

## 2023-08-15 DIAGNOSIS — N18.31 CHRONIC KIDNEY DISEASE, STAGE 3A: ICD-10-CM

## 2023-08-15 DIAGNOSIS — R15.2 INCONTINENCE OF FECES WITH FECAL URGENCY: Primary | ICD-10-CM

## 2023-08-15 DIAGNOSIS — R15.9 INCONTINENCE OF FECES WITH FECAL URGENCY: Primary | ICD-10-CM

## 2023-08-15 PROCEDURE — 99214 PR OFFICE/OUTPT VISIT, EST, LEVL IV, 30-39 MIN: ICD-10-PCS | Mod: S$PBB,,, | Performed by: INTERNAL MEDICINE

## 2023-08-15 PROCEDURE — 99214 OFFICE O/P EST MOD 30 MIN: CPT | Mod: PBBFAC,PO | Performed by: INTERNAL MEDICINE

## 2023-08-15 PROCEDURE — 99214 OFFICE O/P EST MOD 30 MIN: CPT | Mod: S$PBB,,, | Performed by: INTERNAL MEDICINE

## 2023-08-15 PROCEDURE — 99999 PR PBB SHADOW E&M-EST. PATIENT-LVL IV: CPT | Mod: PBBFAC,,, | Performed by: INTERNAL MEDICINE

## 2023-08-15 PROCEDURE — 99999 PR PBB SHADOW E&M-EST. PATIENT-LVL IV: ICD-10-PCS | Mod: PBBFAC,,, | Performed by: INTERNAL MEDICINE

## 2023-09-05 ENCOUNTER — OFFICE VISIT (OUTPATIENT)
Dept: DERMATOLOGY | Facility: CLINIC | Age: 88
End: 2023-09-05
Payer: MEDICARE

## 2023-09-05 DIAGNOSIS — L82.1 SK (SEBORRHEIC KERATOSIS): ICD-10-CM

## 2023-09-05 DIAGNOSIS — L57.0 AK (ACTINIC KERATOSIS): Primary | ICD-10-CM

## 2023-09-05 DIAGNOSIS — R23.8 VENOUS LAKE OF LIP: ICD-10-CM

## 2023-09-05 DIAGNOSIS — Z85.828 HISTORY OF NONMELANOMA SKIN CANCER: ICD-10-CM

## 2023-09-05 PROCEDURE — 99999 PR PBB SHADOW E&M-EST. PATIENT-LVL III: ICD-10-PCS | Mod: PBBFAC,,, | Performed by: DERMATOLOGY

## 2023-09-05 PROCEDURE — 17003 DESTRUCTION, PREMALIGNANT LESIONS; SECOND THROUGH 14 LESIONS: ICD-10-PCS | Mod: S$PBB,,, | Performed by: DERMATOLOGY

## 2023-09-05 PROCEDURE — 17003 DESTRUCT PREMALG LES 2-14: CPT | Mod: PBBFAC | Performed by: DERMATOLOGY

## 2023-09-05 PROCEDURE — 17003 DESTRUCT PREMALG LES 2-14: CPT | Mod: S$PBB,,, | Performed by: DERMATOLOGY

## 2023-09-05 PROCEDURE — 17000 PR DESTRUCTION(LASER SURGERY,CRYOSURGERY,CHEMOSURGERY),PREMALIGNANT LESIONS,FIRST LESION: ICD-10-PCS | Mod: S$PBB,,, | Performed by: DERMATOLOGY

## 2023-09-05 PROCEDURE — 17000 DESTRUCT PREMALG LESION: CPT | Mod: PBBFAC | Performed by: DERMATOLOGY

## 2023-09-05 PROCEDURE — 99999 PR PBB SHADOW E&M-EST. PATIENT-LVL III: CPT | Mod: PBBFAC,,, | Performed by: DERMATOLOGY

## 2023-09-05 PROCEDURE — 99213 OFFICE O/P EST LOW 20 MIN: CPT | Mod: PBBFAC | Performed by: DERMATOLOGY

## 2023-09-05 PROCEDURE — 17000 DESTRUCT PREMALG LESION: CPT | Mod: S$PBB,,, | Performed by: DERMATOLOGY

## 2023-09-05 PROCEDURE — 99213 PR OFFICE/OUTPT VISIT, EST, LEVL III, 20-29 MIN: ICD-10-PCS | Mod: 25,S$PBB,, | Performed by: DERMATOLOGY

## 2023-09-05 PROCEDURE — 99213 OFFICE O/P EST LOW 20 MIN: CPT | Mod: 25,S$PBB,, | Performed by: DERMATOLOGY

## 2023-09-05 NOTE — PROGRESS NOTES
Subjective:      Patient ID:  Aleks Brown is a 89 y.o. male who presents for   Chief Complaint   Patient presents with    Skin Check     Ubse     History of Present Illness: The patient presents for follow up of skin check.    The patient was last seen on: 12/13/2022 for ED&C to L dorsal hand- SCCIS.     This is a high risk patient here to check for the development of new lesions.    Other skin complaints: None         Review of Systems   Skin:  Positive for activity-related sunscreen use and wears hat (always). Negative for daily sunscreen use and recent sunburn.   Hematologic/Lymphatic: Bruises/bleeds easily (on asa).       Objective:   Physical Exam   Constitutional: He appears well-developed and well-nourished. No distress.   Neurological: He is alert and oriented to person, place, and time. He is not disoriented.   Psychiatric: He has a normal mood and affect.   Skin:   Areas Examined (abnormalities noted in diagram):   Scalp / Hair Palpated and Inspected  Head / Face Inspection Performed  Neck Inspection Performed  Chest / Axilla Inspection Performed  Back Inspection Performed  RUE Inspected  LUE Inspection Performed                         Diagram Legend     Erythematous scaling macule/papule c/w actinic keratosis       Vascular papule c/w angioma      Pigmented verrucoid papule/plaque c/w seborrheic keratosis      Yellow umbilicated papule c/w sebaceous hyperplasia      Irregularly shaped tan macule c/w lentigo     1-2 mm smooth white papules consistent with Milia      Movable subcutaneous cyst with punctum c/w epidermal inclusion cyst      Subcutaneous movable cyst c/w pilar cyst      Firm pink to brown papule c/w dermatofibroma      Pedunculated fleshy papule(s) c/w skin tag(s)      Evenly pigmented macule c/w junctional nevus     Mildly variegated pigmented, slightly irregular-bordered macule c/w mildly atypical nevus      Flesh colored to evenly pigmented papule c/w intradermal nevus       Pink  pearly papule/plaque c/w basal cell carcinoma      Erythematous hyperkeratotic cursted plaque c/w SCC      Surgical scar with no sign of skin cancer recurrence      Open and closed comedones      Inflammatory papules and pustules      Verrucoid papule consistent consistent with wart     Erythematous eczematous patches and plaques     Dystrophic onycholytic nail with subungual debris c/w onychomycosis     Umbilicated papule    Erythematous-base heme-crusted tan verrucoid plaque consistent with inflamed seborrheic keratosis     Erythematous Silvery Scaling Plaque c/w Psoriasis     See annotation      Assessment / Plan:        AK (actinic keratosis)  Cryosurgery Procedure Note    Verbal consent from the patient is obtained including, but not limited to, risk of hypopigmentation/hyperpigmentation, scar, recurrence of lesion. The patient is aware of the precancerous quality and need for treatment of these lesions. Liquid nitrogen cryosurgery is applied to the 11 actinic keratoses, as detailed in the physical exam, to produce a freeze injury. The patient is aware that blisters may form and is instructed on wound care with gentle cleansing and use of vaseline ointment to keep moist until healed. The patient is supplied a handout on cryosurgery and is instructed to call if lesions do not completely resolve.      SK (seborrheic keratosis)   - minor problem and chronic.   Reassurance given to patient. No treatment necessary.       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   Reassurance given to patient. No treatment necessary.       History of nonmelanoma skin cancer  Area(s) of previous NMSC evaluated with no signs of recurrence.    Upper body skin examination performed today including at least 6 points as noted in physical examination. No lesions suspicious for malignancy noted.    Recommend daily sun protection/avoidance and use of at least SPF 30, broad spectrum sunscreen (OTC drug).                Follow up in  about 6 months (around 3/5/2024) for UBSE.

## 2023-09-05 NOTE — PATIENT INSTRUCTIONS

## 2023-09-07 ENCOUNTER — TELEPHONE (OUTPATIENT)
Dept: FAMILY MEDICINE | Facility: CLINIC | Age: 88
End: 2023-09-07
Payer: MEDICARE

## 2023-09-07 DIAGNOSIS — M54.50 ACUTE MIDLINE LOW BACK PAIN, UNSPECIFIED WHETHER SCIATICA PRESENT: Primary | ICD-10-CM

## 2023-09-07 RX ORDER — TIZANIDINE 2 MG/1
2 TABLET ORAL NIGHTLY PRN
Qty: 20 TABLET | Refills: 0 | Status: SHIPPED | OUTPATIENT
Start: 2023-09-07

## 2023-09-07 NOTE — TELEPHONE ENCOUNTER
----- Message from Sunita Nation sent at 9/7/2023  3:14 PM CDT -----  Type: Patient Call Back    Who called: self     What is the request in detail: patient is having back pain and would like to know if he can have a script for muscle relaxer. Please call    Can the clinic reply by [facility]? No     Would the patient rather a call back or a response via [facility]?  Call     Best call back number: [phone]     Additional Information:

## 2023-09-07 NOTE — TELEPHONE ENCOUNTER
Rx for tizanidine sent. Take it in the evening.  Be careful because it can cause drowsiness and unsteadiness

## 2023-09-08 RX ORDER — TIZANIDINE 2 MG/1
2 TABLET ORAL NIGHTLY PRN
Qty: 20 TABLET | Refills: 0 | Status: CANCELLED | OUTPATIENT
Start: 2023-09-08

## 2023-09-08 NOTE — TELEPHONE ENCOUNTER
Spoke with patient. Patient states he will  medication at [facility] but request all future medication to be sent to [facility].

## 2023-11-01 NOTE — TELEPHONE ENCOUNTER
Patient rescheduled for 8/15/23 for  2:00 pm for six month follow up.    The patient is a 26y Male complaining of

## 2024-01-09 ENCOUNTER — HOSPITAL ENCOUNTER (EMERGENCY)
Facility: HOSPITAL | Age: 89
Discharge: HOME | End: 2024-01-09
Attending: EMERGENCY MEDICINE
Payer: MEDICARE

## 2024-01-09 VITALS
HEIGHT: 72 IN | BODY MASS INDEX: 26.28 KG/M2 | TEMPERATURE: 98 F | SYSTOLIC BLOOD PRESSURE: 174 MMHG | HEART RATE: 77 BPM | DIASTOLIC BLOOD PRESSURE: 79 MMHG | RESPIRATION RATE: 17 BRPM | OXYGEN SATURATION: 99 % | WEIGHT: 194 LBS

## 2024-01-09 DIAGNOSIS — R42 DIZZINESS: ICD-10-CM

## 2024-01-09 LAB
ALBUMIN SERPL-MCNC: 3.8 G/DL (ref 3.3–5.5)
ALP SERPL-CCNC: 80 U/L (ref 42–141)
BASOPHILS # BLD AUTO: 0.06 K/UL (ref 0–0.2)
BASOPHILS NFR BLD: 0.6 % (ref 0–1.9)
BILIRUB SERPL-MCNC: 0.9 MG/DL (ref 0.2–1.6)
BILIRUBIN, POC UA: NEGATIVE
BLOOD, POC UA: ABNORMAL
BUN SERPL-MCNC: 26 MG/DL (ref 7–22)
CALCIUM SERPL-MCNC: 10.7 MG/DL (ref 8–10.3)
CHLORIDE SERPL-SCNC: 101 MMOL/L (ref 98–108)
CLARITY, POC UA: CLEAR
COLOR, POC UA: YELLOW
CREAT SERPL-MCNC: 0.9 MG/DL (ref 0.6–1.2)
DIFFERENTIAL METHOD BLD: ABNORMAL
EOSINOPHIL # BLD AUTO: 0.2 K/UL (ref 0–0.5)
EOSINOPHIL NFR BLD: 2.3 % (ref 0–8)
ERYTHROCYTE [DISTWIDTH] IN BLOOD BY AUTOMATED COUNT: 12 % (ref 11.5–14.5)
GLUCOSE SERPL-MCNC: 112 MG/DL (ref 73–118)
GLUCOSE, POC UA: NEGATIVE
HCT VFR BLD AUTO: 39.8 % (ref 40–54)
HGB BLD-MCNC: 12.9 G/DL (ref 14–18)
IMM GRANULOCYTES # BLD AUTO: 0.04 K/UL (ref 0–0.04)
IMM GRANULOCYTES NFR BLD AUTO: 0.4 % (ref 0–0.5)
INFLUENZA A ANTIGEN, POC: NEGATIVE
INFLUENZA B ANTIGEN, POC: NEGATIVE
KETONES, POC UA: NEGATIVE
LEUKOCYTE EST, POC UA: NEGATIVE
LYMPHOCYTES # BLD AUTO: 2.1 K/UL (ref 1–4.8)
LYMPHOCYTES NFR BLD: 21.5 % (ref 18–48)
MCH RBC QN AUTO: 32.6 PG (ref 27–31)
MCHC RBC AUTO-ENTMCNC: 32.4 G/DL (ref 32–36)
MCV RBC AUTO: 101 FL (ref 82–98)
MONOCYTES # BLD AUTO: 1 K/UL (ref 0.3–1)
MONOCYTES NFR BLD: 10.5 % (ref 4–15)
NEUTROPHILS # BLD AUTO: 6.4 K/UL (ref 1.8–7.7)
NEUTROPHILS NFR BLD: 64.7 % (ref 38–73)
NITRITE, POC UA: NEGATIVE
NRBC BLD-RTO: 0 /100 WBC
PH UR STRIP: 6 [PH]
PLATELET # BLD AUTO: 242 K/UL (ref 150–450)
PMV BLD AUTO: 11.2 FL (ref 9.2–12.9)
POC ALT (SGPT): 23 U/L (ref 10–47)
POC AST (SGOT): 33 U/L (ref 11–38)
POC CARDIAC TROPONIN I: 0 NG/ML (ref 0–0.08)
POC TCO2: 28 MMOL/L (ref 18–33)
POTASSIUM BLD-SCNC: 4.8 MMOL/L (ref 3.6–5.1)
PROTEIN, POC UA: NEGATIVE
PROTEIN, POC: 7.6 G/DL (ref 6.4–8.1)
RBC # BLD AUTO: 3.96 M/UL (ref 4.6–6.2)
SAMPLE: NORMAL
SODIUM BLD-SCNC: 143 MMOL/L (ref 128–145)
SPECIFIC GRAVITY, POC UA: 1.01
UROBILINOGEN, POC UA: 0.2 E.U./DL
WBC # BLD AUTO: 9.86 K/UL (ref 3.9–12.7)

## 2024-01-09 PROCEDURE — 84484 ASSAY OF TROPONIN QUANT: CPT | Mod: ER

## 2024-01-09 PROCEDURE — 99285 EMERGENCY DEPT VISIT HI MDM: CPT | Mod: 25,ER

## 2024-01-09 PROCEDURE — 80053 COMPREHEN METABOLIC PANEL: CPT | Mod: ER

## 2024-01-09 PROCEDURE — 85025 COMPLETE CBC W/AUTO DIFF WBC: CPT | Mod: ER

## 2024-01-09 PROCEDURE — 85025 COMPLETE CBC W/AUTO DIFF WBC: CPT | Performed by: EMERGENCY MEDICINE

## 2024-01-09 PROCEDURE — 93005 ELECTROCARDIOGRAM TRACING: CPT | Mod: ER

## 2024-01-09 PROCEDURE — 87804 INFLUENZA ASSAY W/OPTIC: CPT | Mod: 59,ER

## 2024-01-09 PROCEDURE — 93010 ELECTROCARDIOGRAM REPORT: CPT | Mod: ,,, | Performed by: INTERNAL MEDICINE

## 2024-01-09 NOTE — ED PROVIDER NOTES
"Encounter Date: 1/9/2024    SCRIBE #1 NOTE: I, Jolene Garber, am scribing for, and in the presence of,  Kelley Mclaughlin MD. I have scribed the following portions of the note - Other sections scribed: HPI, ROS, PE.       History     Chief Complaint   Patient presents with    Dizziness     Pt reports dizziness that started yesterday when the weather changed that is intermittent and feels like the room is spinning. Denies visual changes or other symptoms. Pt ambulatory without assist and has steady gait.      Aleks Brown is a 89 y.o. male with PMHx of CAD, HLD, and HTN  who presents to the ED complaining of intermittent dizziness that began yesterday. Pt states he "feels like the room is spinning" and notes dizziness is exacerbated when he stands up from a sitting position. Pt notes that he is able to ambulate. Pt denies any nausea, vomiting or nasal congestion. He also denies any visual changes or other associated symptoms.  Patient denies chest pain.  Pt reports taking his blood pressure medication this morning and pt's wife states his blood pressure was low PTA. Pt reports one previous dizzy spell accompanied by emesis 2-3 years ago but states all his exams came back normal.     The history is provided by the patient and the spouse. No  was used.     Review of patient's allergies indicates:   Allergen Reactions    Iodine and iodide containing products Anaphylaxis    Adhesive     Dye Other (See Comments)    Iodinated contrast media     Trazodone      dizziness    Keflex [cephalexin] Diarrhea and Nausea And Vomiting    Penicillins Rash    Tylenol [acetaminophen] Other (See Comments)     Triggers PTSD/nightmares     Past Medical History:   Diagnosis Date    Allergy     seasonal    Arthritis     Basal cell carcinoma 10 years    right ear     Basal cell carcinoma 08/24/2017    Left earlobe     Basal cell carcinoma 11/14/2020    Left wrist    Basal cell carcinoma 05/18/2020    left nasal tip    " CAD (coronary artery disease) 11/29/2012    Hyperlipidemia     Hypertension     Joint pain     PTSD (post-traumatic stress disorder)     SCC (squamous cell carcinoma) 12/12/2022    L dorsal hand-ED&C    Squamous cell carcinoma 06/26/2017    Squamous cell carcinoma of skin 10/2019    right cheek (cryosurgery)      Past Surgical History:   Procedure Laterality Date    CATARACT EXTRACTION      CORONARY ARTERY BYPASS GRAFT       Family History   Problem Relation Age of Onset    Skin cancer Mother     Leukemia Father     Diabetes Sister     Peripheral vascular disease Sister     Cancer Brother     No Known Problems Daughter     No Known Problems Daughter     No Known Problems Daughter     No Known Problems Daughter     No Known Problems Son     No Known Problems Son     Melanoma Neg Hx      Social History     Tobacco Use    Smoking status: Former    Smokeless tobacco: Never   Substance Use Topics    Alcohol use: Yes     Comment: Red wine daily    Drug use: No     Review of Systems   Constitutional:  Negative for activity change and fatigue.   HENT:  Negative for congestion, ear pain and facial swelling.    Eyes:  Negative for pain and visual disturbance.   Respiratory:  Negative for chest tightness and shortness of breath.    Cardiovascular:  Negative for chest pain and palpitations.   Gastrointestinal:  Negative for abdominal pain, constipation, diarrhea, nausea and vomiting.   Genitourinary:  Negative for difficulty urinating and dysuria.   Musculoskeletal:  Negative for back pain and gait problem.   Skin:  Negative for rash.   Neurological:  Positive for dizziness. Negative for weakness and headaches.   Hematological:  Negative for adenopathy.   Psychiatric/Behavioral:  Negative for behavioral problems.        Physical Exam     Initial Vitals [01/09/24 1318]   BP Pulse Resp Temp SpO2   (!) 218/83 (!) 50 16 97.9 °F (36.6 °C) 99 %      MAP       --         Physical Exam    Nursing note and vitals  reviewed.  Constitutional: He appears well-developed.   HENT:   Head: Normocephalic.   Right Ear: Tympanic membrane, external ear and ear canal normal.   Left Ear: Tympanic membrane, external ear and ear canal normal.   Eyes: Conjunctivae and lids are normal. Pupils are equal, round, and reactive to light. Left conjunctiva is not injected.   Neck: Neck supple.   Cardiovascular:  Normal rate, regular rhythm and normal heart sounds.           Pulmonary/Chest: Breath sounds normal. No respiratory distress. He has no decreased breath sounds. He has no wheezes. He has no rhonchi. He has no rales.   Abdominal: He exhibits no distension.   Musculoskeletal:         General: Normal range of motion.      Cervical back: Neck supple.     Neurological: He is alert.   Skin: Skin is warm and dry.   Psychiatric: He has a normal mood and affect.         ED Course   Procedures  Labs Reviewed   CBC W/ AUTO DIFFERENTIAL - Abnormal; Notable for the following components:       Result Value    RBC 3.96 (*)     Hemoglobin 12.9 (*)     Hematocrit 39.8 (*)      (*)     MCH 32.6 (*)     All other components within normal limits   POCT URINALYSIS W/O SCOPE - Abnormal; Notable for the following components:    Blood, UA Trace-intact (*)     All other components within normal limits   POCT CMP - Abnormal; Notable for the following components:    POC BUN 26 (*)     Calcium, POC 10.7 (*)     All other components within normal limits   TROPONIN ISTAT   SARS-COV-2 RDRP GENE   POCT URINALYSIS(INSTRUMENT)   POCT INFLUENZA A/B MOLECULAR   POCT CMP   POCT TROPONIN   POCT RAPID INFLUENZA A/B     EKG Readings: (Independently Interpreted)   Initial Reading: No STEMI. Rhythm: Sinus Bradycardia. Heart Rate: 52.       Imaging Results              CT Head Without Contrast (Final result)  Result time 01/09/24 15:14:12      Final result by George Herrera MD (01/09/24 15:14:12)                   Impression:      No acute large vascular territory infarct  or intracranial hemorrhage identified.  If persistent neurologic deficit, MRI brain can be obtained.    Sequela of senescent and chronic microvascular ischemic change similar to prior.      Electronically signed by: George Herrera MD  Date:    01/09/2024  Time:    15:14               Narrative:    EXAMINATION:  CT HEAD WITHOUT CONTRAST    CLINICAL HISTORY:  Dizziness, persistent/recurrent, cardiac or vascular cause suspected;    TECHNIQUE:  Low dose axial CT images obtained throughout the head without intravenous contrast. Sagittal and coronal reconstructions were performed.    COMPARISON:  MRI brain 05/24/2023, head and maxillofacial CT 03/21/2019    FINDINGS:  Intracranial compartment:    Generalized cerebral volume loss.  Ventricles are midline and stable in size and configuration without distortion by mass effect or acute hydrocephalus.  No extra-axial blood or fluid collections.    Grossly stable patchy hypoattenuation of the supratentorial white matter consistent with chronic microvascular ischemic change.  No definite new focal areas of abnormal parenchymal attenuation.  No parenchymal mass, hemorrhage, edema or major vascular distribution infarct.  Skull base atherosclerotic vascular calcifications noted.    Skull/extracranial contents (limited evaluation): No fracture.  Small right mastoid effusion, unchanged.  Left mastoid air cells and paranasal sinuses are essentially clear.  Postoperative change at the bilateral orbits noted.                                       X-Ray Chest PA And Lateral (Final result)  Result time 01/09/24 15:10:03      Final result by Arpan Newell MD (01/09/24 15:10:03)                   Impression:      No acute radiographic abnormality.  No significant change.      Electronically signed by: Arpan Newell  Date:    01/09/2024  Time:    15:10               Narrative:    EXAMINATION:  XR CHEST PA AND LATERAL    CLINICAL HISTORY:  Dizziness and giddiness    TECHNIQUE:  PA and  lateral views of the chest were performed.    COMPARISON:  08/03/2023    FINDINGS:  Small calcified granuloma in the right upper lobe.  Aortic atherosclerosis.  Sternotomy wires are present.    The lungs are clear, with normal appearance of pulmonary vasculature and no pleural effusion or pneumothorax.    The cardiac silhouette is normal in size. The hilar and mediastinal contours are unremarkable.    Bones are intact.                                       Medications - No data to display  Medical Decision Making  This is an emergent evaluation of a pleasant 89-year-old man who presented to the emergency department today secondary to dizziness.  Differential diagnoses included peripheral vertigo, central vertigo, ACS, intracranial pathology, amongst others.  On physical examination, patient was in no acute distress.  Heart sounds were bradycardic and lung sounds were clear to auscultation bilaterally.  Patient was fully neurologically intact without sensory, motor, or visual deficits.  He was ambulatory without ataxia.  Labs and imaging were obtained.  CT scan showed no acute large vascular territory infarct nor intracranial hemorrhage per Radiology interpretation.  Labs were reassuring and troponin was negative.  I discharged this patient home with instructions to follow closely with his primary care physician and should his symptoms fail to improve or should they worsen to please return to the emergency department immediately for re-evaluation.  Patient was amenable to this plan.  He is discharged home in stable condition, ambulating from the emergency department unassisted.    Kelley Mclaughlin MD  3:40 PM  1/9/2024       Amount and/or Complexity of Data Reviewed  Labs: ordered. Decision-making details documented in ED Course.  Radiology: ordered. Decision-making details documented in ED Course.            Scribe Attestation:   Scribe #1: I performed the above scribed service and the documentation accurately describes  the services I performed. I attest to the accuracy of the note.                             I, Kelley Watson , personally performed the services described in this documentation. All medical record entries made by the scribe were at my direction and in my presence. I have reviewed the chart and agree that the record reflects my personal performance and is accurate and complete.    Clinical Impression:  Final diagnoses:  [R42] Dizziness          ED Disposition Condition    Discharge Stable          ED Prescriptions    None       Follow-up Information       Follow up With Specialties Details Why Contact Info    Deshaun Goel MD Internal Medicine Schedule an appointment as soon as possible for a visit   [address] [place] [address]  Ally [place]MOOD [id number]  [phone]               Kelley Watson MD  01/09/24 2087

## 2024-02-08 ENCOUNTER — LAB VISIT (OUTPATIENT)
Dept: LAB | Facility: HOSPITAL | Age: 89
End: 2024-02-08
Payer: MEDICARE

## 2024-02-08 DIAGNOSIS — E78.5 DYSLIPIDEMIA: ICD-10-CM

## 2024-02-08 DIAGNOSIS — N18.31 CHRONIC KIDNEY DISEASE, STAGE 3A: ICD-10-CM

## 2024-02-08 DIAGNOSIS — R73.09 ABNORMAL GLUCOSE: ICD-10-CM

## 2024-02-08 LAB
ALBUMIN SERPL BCP-MCNC: 3.8 G/DL (ref 3.5–5.2)
ALP SERPL-CCNC: 83 U/L (ref 55–135)
ALT SERPL W/O P-5'-P-CCNC: 19 U/L (ref 10–44)
ANION GAP SERPL CALC-SCNC: 7 MMOL/L (ref 8–16)
AST SERPL-CCNC: 27 U/L (ref 10–40)
BILIRUB SERPL-MCNC: 0.7 MG/DL (ref 0.1–1)
BUN SERPL-MCNC: 21 MG/DL (ref 10–30)
CALCIUM SERPL-MCNC: 10 MG/DL (ref 8.7–10.5)
CHLORIDE SERPL-SCNC: 107 MMOL/L (ref 95–110)
CHOLEST SERPL-MCNC: 122 MG/DL (ref 120–199)
CHOLEST/HDLC SERPL: 2.8 {RATIO} (ref 2–5)
CO2 SERPL-SCNC: 26 MMOL/L (ref 23–29)
CREAT SERPL-MCNC: 1.2 MG/DL (ref 0.5–1.4)
ERYTHROCYTE [DISTWIDTH] IN BLOOD BY AUTOMATED COUNT: 12.3 % (ref 11.5–14.5)
EST. GFR  (NO RACE VARIABLE): 57.1 ML/MIN/1.73 M^2
ESTIMATED AVG GLUCOSE: 117 MG/DL (ref 68–131)
GLUCOSE SERPL-MCNC: 105 MG/DL (ref 70–110)
HBA1C MFR BLD: 5.7 % (ref 4–5.6)
HCT VFR BLD AUTO: 39.5 % (ref 40–54)
HDLC SERPL-MCNC: 43 MG/DL (ref 40–75)
HDLC SERPL: 35.2 % (ref 20–50)
HGB BLD-MCNC: 12.7 G/DL (ref 14–18)
LDLC SERPL CALC-MCNC: 55.6 MG/DL (ref 63–159)
MCH RBC QN AUTO: 33.8 PG (ref 27–31)
MCHC RBC AUTO-ENTMCNC: 32.2 G/DL (ref 32–36)
MCV RBC AUTO: 105 FL (ref 82–98)
NONHDLC SERPL-MCNC: 79 MG/DL
PLATELET # BLD AUTO: 214 K/UL (ref 150–450)
PMV BLD AUTO: 14.1 FL (ref 9.2–12.9)
POTASSIUM SERPL-SCNC: 4.9 MMOL/L (ref 3.5–5.1)
PROT SERPL-MCNC: 6.7 G/DL (ref 6–8.4)
RBC # BLD AUTO: 3.76 M/UL (ref 4.6–6.2)
SODIUM SERPL-SCNC: 140 MMOL/L (ref 136–145)
TRIGL SERPL-MCNC: 117 MG/DL (ref 30–150)
WBC # BLD AUTO: 7.79 K/UL (ref 3.9–12.7)

## 2024-02-08 PROCEDURE — 80053 COMPREHEN METABOLIC PANEL: CPT | Performed by: INTERNAL MEDICINE

## 2024-02-08 PROCEDURE — 80061 LIPID PANEL: CPT | Performed by: INTERNAL MEDICINE

## 2024-02-08 PROCEDURE — 85027 COMPLETE CBC AUTOMATED: CPT | Performed by: INTERNAL MEDICINE

## 2024-02-08 PROCEDURE — 36415 COLL VENOUS BLD VENIPUNCTURE: CPT | Mod: PO | Performed by: INTERNAL MEDICINE

## 2024-02-08 PROCEDURE — 83036 HEMOGLOBIN GLYCOSYLATED A1C: CPT | Performed by: INTERNAL MEDICINE

## 2024-02-23 NOTE — PROGRESS NOTES
This note was created by combination of typed  and M-Modal dictation.  Transcription errors may be present.  If there are any questions, please contact me.    Assessment and Plan:   Assessment and Plan   Dermatitis  -dermatitis, has some features suspicious for tinea, suspicious for eczema.  Trial of Lotrisone.  Had previously given him prescription for this before.  He has not sure if he has a still at home or not  -     clotrimazole-betamethasone 1-0.05% (LOTRISONE) cream; Apply topically 2 (two) times daily.  Dispense: 45 g; Refill: 0    Vertigo  -recent ED visit for what sounds like vertigo.  Negative head CT.  Has not recurred since.  Monitor.    Essential hypertension  Chronic kidney disease, stage 3a  -blood pressure stable.  Previous visit with me sounds like he was taking Lasix but this time it does not sound like he is taking regularly and last refill was last year.  Does not find the pedal edema bothersome.  Blood pressure today is okay.  Stay off the Lasix.  Refilled lisinopril and metoprolol  -     lisinopriL 10 MG tablet; Take 1 tablet (10 mg total) by mouth once daily.  Dispense: 90 tablet; Refill: 3  -     metoprolol succinate (TOPROL-XL) 100 MG 24 hr tablet; Take 1 tablet (100 mg total) by mouth once daily.  Dispense: 90 tablet; Refill: 3  -     CBC Without Differential; Future; Expected date: 02/23/2025  -     Comprehensive Metabolic Panel; Future; Expected date: 02/23/2025  -     Lipid Panel; Future; Expected date: 02/23/2025    Macrocytic anemia  -stable.  Hemoglobin slight improvement compared to previous    Bilateral carotid artery stenosis  Mixed hyperlipidemia  Coronary artery disease involving native coronary artery of native heart without angina pectoris  -pre visit labs stable on statin.  On beta-blocker, on ACE inhibitor.  No changes.  -     atorvastatin (LIPITOR) 40 MG tablet; Take 1 tablet (40 mg total) by mouth once daily.  Dispense: 90 tablet; Refill: 3  -     CBC Without  Differential; Future; Expected date: 02/23/2025  -     Comprehensive Metabolic Panel; Future; Expected date: 02/23/2025  -     Lipid Panel; Future; Expected date: 02/23/2025    Abnormal glucose  -mildly elevated A1c.  Check future labs  -     Hemoglobin A1C; Future; Expected date: 02/23/2025    PTSD (post-traumatic stress disorder); short temper; avoids crowds. [facility]    Flu vaccine need  -     Influenza (FLUAD) - Quadrivalent (Adjuvanted) *Preferred* (65+) (PF)             Medications Discontinued During This Encounter   Medication Reason    clotrimazole-betamethasone 1-0.05% (LOTRISONE) cream Reorder    clotrimazole-betamethasone 1-0.05% (LOTRISONE) cream     furosemide (LASIX) 20 MG tablet Therapy completed    lisinopriL 10 MG tablet Reorder    atorvastatin (LIPITOR) 40 MG tablet Reorder    metoprolol succinate (TOPROL-XL) 100 MG 24 hr tablet Reorder       meds sent this encounter:  Medications Ordered This Encounter   Medications    atorvastatin (LIPITOR) 40 MG tablet     Sig: Take 1 tablet (40 mg total) by mouth once daily.     Dispense:  90 tablet     Refill:  3     Pharmacy update refills, keep on file, not requesting Rx to be filled today.    clotrimazole-betamethasone 1-0.05% (LOTRISONE) cream     Sig: Apply topically 2 (two) times daily.     Dispense:  45 g     Refill:  0     DX Code Needed  .    lisinopriL 10 MG tablet     Sig: Take 1 tablet (10 mg total) by mouth once daily.     Dispense:  90 tablet     Refill:  3     . Pharmacy update refills, keep on file, not requesting Rx to be filled today.    metoprolol succinate (TOPROL-XL) 100 MG 24 hr tablet     Sig: Take 1 tablet (100 mg total) by mouth once daily.     Dispense:  90 tablet     Refill:  3     . Pharmacy update refills, keep on file, not requesting Rx to be filled today.         Follow Up:  Office visit 6 months.  Labs in 1 year  Future Appointments   Date Time Provider Department Center   5/2/2024 10:00  Gillian Guerin MD [facility] DER[address]   9/5/2024  1:40 PM Deshaun Goel MD [facility]          Subjective:   Subjective   Chief Complaint   Patient presents with    6 month follow up       HPI  Aleks is a 89 y.o. male.     Social History     Tobacco Use    Smoking status: Former    Smokeless tobacco: Never   Substance Use Topics    Alcohol use: Yes     Comment: Red wine daily      Social History     Occupational History    Occupation: 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  .  Six kids. [place] [employer] vet.  [employer].         Social History     Social History Narrative     x 60 yr.   [employer] [place].         Last appointment with this clinic was Visit date not found. Last visit with me 8/15/2023   To summarize last visit and events leading up to today:  Hypertension, CKD stage IIIA, hyperlipidemia, abnormal glucose, stable   Macrocytic anemia with alcohol use.  At the time he noted he had stop alcohol maybe for 5 months ago.    NATTY intolerant of CPAP  Eczema  area.  Lotrisone.    Benzodiazepine dependence.  Stable off of lorazepam  Frostbite with neuropathy.  [place] .      Last visit with me 8/15  Fecal incontinence episodes.  Trial Kegel exercises.  eGFR > 60, monitor.  Stable off of BZD    ED 1/9/24 c/o dizziness  CT head negative.    Pre visit labs  CBC anemia macrocytic, stable  CMP creatinine 1.2 CKD 3a  Lipid good  A1c 5.7      Today's visit:    Has had episodes of dizziness in the past.  Previously it made him vomit.  The episode in January - sensation of room spinning.    The first time something like this happened was in the evening time when he went to bed and then started sensation of room spinning  This time must have started in the morning.  Did not start while in bed.    Symptoms resolved during the ER visit.    Usually wakes around 5 AM.    Has not had recurrence since.    With change in position no dizziness no lightheadedness.      Has a recurrent rash.  The  center of the chest, the upper chest both sides. And on the waist. Sometimes itch. No pain.  Does not sweat a lot.     But overall feels good.     Filling and taking 3 pills. Looks like has not filled the lasix since last year but the others filled 8/2023 - metoprolol, lisinopril, atorvastatin.     Some pedal edema today.  Does not find it particularly bothersome.  Has a known history of neuropathy from history of frostbite in his legs.    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                           There is 40 - 49% left Internal Carotid stenosis.   July 2011      Dyslipidemia 11/06/2012       PAST MEDICAL PROBLEMS, PAST SURGICAL HISTORY: please see relevant portions of the electronic medical record    ALLERGIES AND MEDICATIONS: updated and reviewed.  Medication List with Changes/Refills   Current Medications    ASPIRIN (ECOTRIN) 81 MG EC TABLET    Take 81 mg by mouth once daily.     ATORVASTATIN (LIPITOR) 40 MG TABLET    Take 1 tablet (40 mg total) by mouth once daily.    CHOLECALCIFEROL, VITAMIN D3, (VITAMIN D3) 50 MCG (2,000 UNIT) TAB    Take 1 tablet by mouth once daily.    CLOTRIMAZOLE-BETAMETHASONE 1-0.05% (LOTRISONE) CREAM    APPLY TOPICALLY TO AFFECTED AREA 2 TIMES DAILY AS NEEDED  AREA    FUROSEMIDE (LASIX) 20 MG TABLET    Take 1 tablet (20 mg total) by mouth once daily.    LISINOPRIL 10 MG TABLET    Take 1 tablet (10 mg total) by mouth once daily.    METOPROLOL SUCCINATE (TOPROL-XL) 100 MG 24 HR TABLET    Take 1 tablet (100 mg total) by mouth once daily.    MULTIVITAMIN ORAL    Take 1 tablet by mouth once daily.    NITROGLYCERIN (NITROSTAT) 0.4 MG SL TABLET    Place 1 tablet under the tongue as needed.     TIZANIDINE (ZANAFLEX) 2 MG TABLET    Take 1 tablet (2 mg total) by mouth nightly as needed (back pain).         Objective:   Objective   Physical Exam   Vitals:    02/26/24 0945 02/26/24 1011   BP: (!) 142/68 104/62   BP Location:  Left arm   Patient Position:  Sitting   BP Method:  Large (Manual)   Pulse: (!) 59    Temp: 97.9 °F (36.6 °C)    TempSrc: Oral    SpO2: 95%    Weight: 92.5 kg (204 lb 0.6 oz)    Height: 6' 1.83" (1.875 m)     Body mass index is 26.32 kg/m².  Weight: 92.5 kg (204 lb 0.6 oz)   Height: 6' 1.83" (187.5 cm)     Physical " Exam  Constitutional:       General: He is not in acute distress.     Appearance: He is well-developed.   Eyes:      Extraocular Movements: Extraocular movements intact.   Cardiovascular:      Rate and Rhythm: Normal rate and regular rhythm.      Heart sounds: Normal heart sounds. No murmur heard.  Pulmonary:      Effort: Pulmonary effort is normal.      Breath sounds: Normal breath sounds.   Musculoskeletal:         General: Normal range of motion.      Right lower leg: Edema present.      Left lower leg: Edema present.      Comments: Mild pitting ankle edema bilaterally   Skin:     General: Skin is warm and dry.      Comments: On the center of the chest with streaky erythematous patches with some scale, generalized xerosis  Patches of erythematous patches on both left and right upper lateral chest, active outer border, circular in appearance   Neurological:      Mental Status: He is alert and oriented to person, place, and time.      Coordination: Coordination normal.   Psychiatric:         Behavior: Behavior normal.         Thought Content: Thought content normal.         Component      Latest Ref Rng 8/1/2023 2/8/2024   WBC      3.90 - 12.70 K/uL 8.37  7.79    RBC      4.60 - 6.20 M/uL 3.43 (L)  3.76 (L)    Hemoglobin      14.0 - 18.0 g/dL 11.4 (L)  12.7 (L)    Hematocrit      40.0 - 54.0 % 34.5 (L)  39.5 (L)    MCV      82 - 98 fL 101 (H)  105 (H)    MCH      27.0 - 31.0 pg 33.2 (H)  33.8 (H)    MCHC      32.0 - 36.0 g/dL 33.0  32.2    RDW      11.5 - 14.5 % 12.4  12.3    Platelet Count      150 - 450 K/uL 227  214    MPV      9.2 - 12.9 fL 13.7 (H)  14.1 (H)    Immature Granulocytes      0.0 - 0.5 % 0.2     Gran # (ANC)      1.8 - 7.7 K/uL 5.2     Immature Grans (Abs)      0.00 - 0.04 K/uL 0.02     Lymph #      1.0 - 4.8 K/uL 2.0     Mono #      0.3 - 1.0 K/uL 0.9     Eos #      0.0 - 0.5 K/uL 0.2     Baso #      0.00 - 0.20 K/uL 0.05     nRBC      0 /100 WBC 0     Gran %      38.0 - 73.0 % 62.7     Lymph %       18.0 - 48.0 % 23.5     Mono %      4.0 - 15.0 % 10.6     Eos %      0.0 - 8.0 % 2.4     Basophil %      0.0 - 1.9 % 0.6     Differential Method Automated     Sodium      136 - 145 mmol/L 136  140    Potassium      3.5 - 5.1 mmol/L 4.6  4.9    Chloride      95 - 110 mmol/L 105  107    CO2      23 - 29 mmol/L 23  26    Glucose      70 - 110 mg/dL 92  105    BUN      10 - 30 mg/dL 31 (H)  21    Creatinine      0.5 - 1.4 mg/dL 1.0  1.2    Calcium      8.7 - 10.5 mg/dL 10.1  10.0    PROTEIN TOTAL      6.0 - 8.4 g/dL 6.8  6.7    Albumin      3.5 - 5.2 g/dL 3.9  3.8    BILIRUBIN TOTAL      0.1 - 1.0 mg/dL 0.8  0.7    ALP      55 - 135 U/L 84  83    AST      10 - 40 U/L 27  27    ALT      10 - 44 U/L 18  19    eGFR      >60 mL/min/1.73 m^2 >60.0  57.1 !    Anion Gap      8 - 16 mmol/L 8  7 (L)    Cholesterol Total      120 - 199 mg/dL 130  122    Triglycerides      30 - 150 mg/dL 70  117    HDL      40 - 75 mg/dL 42  43    LDL Cholesterol      63.0 - 159.0 mg/dL 74.0  55.6 (L)    HDL/Cholesterol Ratio      20.0 - 50.0 % 32.3  35.2    Total Cholesterol/HDL Ratio      2.0 - 5.0  3.1  2.8    Non-HDL Cholesterol      mg/dL 88  79    Hemoglobin A1C External      4.0 - 5.6 % 5.5  5.7 (H)    Estimated Avg Glucose      68 - 131 mg/dL 111  117       Legend:  (L) Low  (H) High  ! Abnormal

## 2024-02-26 ENCOUNTER — OFFICE VISIT (OUTPATIENT)
Dept: FAMILY MEDICINE | Facility: CLINIC | Age: 89
End: 2024-02-26
Payer: MEDICARE

## 2024-02-26 VITALS
HEIGHT: 74 IN | HEART RATE: 59 BPM | TEMPERATURE: 98 F | BODY MASS INDEX: 26.19 KG/M2 | SYSTOLIC BLOOD PRESSURE: 104 MMHG | OXYGEN SATURATION: 95 % | DIASTOLIC BLOOD PRESSURE: 62 MMHG | WEIGHT: 204.06 LBS

## 2024-02-26 DIAGNOSIS — L30.9 DERMATITIS: Primary | ICD-10-CM

## 2024-02-26 DIAGNOSIS — N18.31 CHRONIC KIDNEY DISEASE, STAGE 3A: ICD-10-CM

## 2024-02-26 DIAGNOSIS — R73.09 ABNORMAL GLUCOSE: ICD-10-CM

## 2024-02-26 DIAGNOSIS — F43.10 PTSD (POST-TRAUMATIC STRESS DISORDER): ICD-10-CM

## 2024-02-26 DIAGNOSIS — I25.10 CORONARY ARTERY DISEASE INVOLVING NATIVE CORONARY ARTERY OF NATIVE HEART WITHOUT ANGINA PECTORIS: ICD-10-CM

## 2024-02-26 DIAGNOSIS — I10 ESSENTIAL HYPERTENSION: ICD-10-CM

## 2024-02-26 DIAGNOSIS — Z23 FLU VACCINE NEED: ICD-10-CM

## 2024-02-26 DIAGNOSIS — E78.2 MIXED HYPERLIPIDEMIA: ICD-10-CM

## 2024-02-26 DIAGNOSIS — I65.23 BILATERAL CAROTID ARTERY STENOSIS: ICD-10-CM

## 2024-02-26 DIAGNOSIS — R42 VERTIGO: ICD-10-CM

## 2024-02-26 DIAGNOSIS — D53.9 MACROCYTIC ANEMIA: ICD-10-CM

## 2024-02-26 PROCEDURE — 99214 OFFICE O/P EST MOD 30 MIN: CPT | Mod: S$PBB,,, | Performed by: INTERNAL MEDICINE

## 2024-02-26 PROCEDURE — 99999PBSHW FLU VACCINE - QUADRIVALENT - ADJUVANTED: Mod: PBBFAC,,,

## 2024-02-26 PROCEDURE — G0008 ADMIN INFLUENZA VIRUS VAC: HCPCS | Mod: PBBFAC,PO

## 2024-02-26 PROCEDURE — 99999 PR PBB SHADOW E&M-EST. PATIENT-LVL III: CPT | Mod: PBBFAC,,, | Performed by: INTERNAL MEDICINE

## 2024-02-26 PROCEDURE — 90694 VACC AIIV4 NO PRSRV 0.5ML IM: CPT | Mod: PBBFAC,PO

## 2024-02-26 PROCEDURE — 99213 OFFICE O/P EST LOW 20 MIN: CPT | Mod: PBBFAC,PO | Performed by: INTERNAL MEDICINE

## 2024-02-26 RX ORDER — ATORVASTATIN CALCIUM 40 MG/1
40 TABLET, FILM COATED ORAL DAILY
Qty: 90 TABLET | Refills: 3 | Status: SHIPPED | OUTPATIENT
Start: 2024-02-26

## 2024-02-26 RX ORDER — METOPROLOL SUCCINATE 100 MG/1
100 TABLET, EXTENDED RELEASE ORAL DAILY
Qty: 90 TABLET | Refills: 3 | Status: SHIPPED | OUTPATIENT
Start: 2024-02-26

## 2024-02-26 RX ORDER — CLOTRIMAZOLE AND BETAMETHASONE DIPROPIONATE 10; .64 MG/G; MG/G
CREAM TOPICAL 2 TIMES DAILY
Qty: 45 G | Refills: 0 | Status: SHIPPED | OUTPATIENT
Start: 2024-02-26

## 2024-02-26 RX ORDER — CLOTRIMAZOLE AND BETAMETHASONE DIPROPIONATE 10; .64 MG/G; MG/G
CREAM TOPICAL 2 TIMES DAILY
Qty: 45 G | Refills: 0
Start: 2024-02-26 | End: 2024-02-26

## 2024-02-26 RX ORDER — LISINOPRIL 10 MG/1
10 TABLET ORAL DAILY
Qty: 90 TABLET | Refills: 3 | Status: SHIPPED | OUTPATIENT
Start: 2024-02-26

## 2024-05-02 ENCOUNTER — OFFICE VISIT (OUTPATIENT)
Dept: DERMATOLOGY | Facility: CLINIC | Age: 89
End: 2024-05-02
Payer: MEDICARE

## 2024-05-02 DIAGNOSIS — L30.8 ASTEATOTIC ECZEMA: ICD-10-CM

## 2024-05-02 DIAGNOSIS — L57.0 AK (ACTINIC KERATOSIS): Primary | ICD-10-CM

## 2024-05-02 DIAGNOSIS — Z85.828 HISTORY OF NONMELANOMA SKIN CANCER: ICD-10-CM

## 2024-05-02 DIAGNOSIS — L82.1 SK (SEBORRHEIC KERATOSIS): ICD-10-CM

## 2024-05-02 DIAGNOSIS — R23.8 VENOUS LAKE OF LIP: ICD-10-CM

## 2024-05-02 PROCEDURE — 99213 OFFICE O/P EST LOW 20 MIN: CPT | Mod: 25,S$PBB,, | Performed by: DERMATOLOGY

## 2024-05-02 PROCEDURE — 17000 DESTRUCT PREMALG LESION: CPT | Mod: PBBFAC | Performed by: DERMATOLOGY

## 2024-05-02 PROCEDURE — 17003 DESTRUCT PREMALG LES 2-14: CPT | Mod: PBBFAC | Performed by: DERMATOLOGY

## 2024-05-02 PROCEDURE — 17003 DESTRUCT PREMALG LES 2-14: CPT | Mod: S$PBB,,, | Performed by: DERMATOLOGY

## 2024-05-02 PROCEDURE — 17000 DESTRUCT PREMALG LESION: CPT | Mod: S$PBB,,, | Performed by: DERMATOLOGY

## 2024-05-02 PROCEDURE — 99213 OFFICE O/P EST LOW 20 MIN: CPT | Mod: PBBFAC,25 | Performed by: DERMATOLOGY

## 2024-05-02 PROCEDURE — 99999 PR PBB SHADOW E&M-EST. PATIENT-LVL III: CPT | Mod: PBBFAC,,, | Performed by: DERMATOLOGY

## 2024-05-02 RX ORDER — CLOBETASOL PROPIONATE 0.46 MG/ML
SOLUTION TOPICAL
Qty: 50 ML | Refills: 3 | Status: SHIPPED | OUTPATIENT
Start: 2024-05-02

## 2024-05-02 NOTE — PROGRESS NOTES
Subjective:      Patient ID:  Aleks Brown is a 89 y.o. male who presents for   Chief Complaint   Patient presents with    Skin Check     UBSE     History of Present Illness: The patient presents for follow up of skin check.    The patient was last seen on: 9/5/2023 for cryosurgery to actinic keratoses which have resolved.   This is a high risk patient here to check for the development of new lesions.     Other skin complaints: none       Review of Systems   Skin:  Positive for activity-related sunscreen use and wears hat (always). Negative for daily sunscreen use and recent sunburn.   Hematologic/Lymphatic: Bruises/bleeds easily (on asa).       Objective:   Physical Exam   Constitutional: He appears well-developed and well-nourished. No distress.   Neurological: He is alert and oriented to person, place, and time. He is not disoriented.   Psychiatric: He has a normal mood and affect.   Skin:   Areas Examined (abnormalities noted in diagram):   Scalp / Hair Palpated and Inspected  Head / Face Inspection Performed  Neck Inspection Performed  Chest / Axilla Inspection Performed  Back Inspection Performed  RUE Inspected  LUE Inspection Performed                         Diagram Legend     Erythematous scaling macule/papule c/w actinic keratosis       Vascular papule c/w angioma      Pigmented verrucoid papule/plaque c/w seborrheic keratosis      Yellow umbilicated papule c/w sebaceous hyperplasia      Irregularly shaped tan macule c/w lentigo     1-2 mm smooth white papules consistent with Milia      Movable subcutaneous cyst with punctum c/w epidermal inclusion cyst      Subcutaneous movable cyst c/w pilar cyst      Firm pink to brown papule c/w dermatofibroma      Pedunculated fleshy papule(s) c/w skin tag(s)      Evenly pigmented macule c/w junctional nevus     Mildly variegated pigmented, slightly irregular-bordered macule c/w mildly atypical nevus      Flesh colored to evenly pigmented papule c/w  intradermal nevus       Pink pearly papule/plaque c/w basal cell carcinoma      Erythematous hyperkeratotic cursted plaque c/w SCC      Surgical scar with no sign of skin cancer recurrence      Open and closed comedones      Inflammatory papules and pustules      Verrucoid papule consistent consistent with wart     Erythematous eczematous patches and plaques     Dystrophic onycholytic nail with subungual debris c/w onychomycosis     Umbilicated papule    Erythematous-base heme-crusted tan verrucoid plaque consistent with inflamed seborrheic keratosis     Erythematous Silvery Scaling Plaque c/w Psoriasis     See annotation      Assessment / Plan:        AK (actinic keratosis)  Cryosurgery Procedure Note    Verbal consent from the patient is obtained including, but not limited to, risk of hypopigmentation/hyperpigmentation, scar, recurrence of lesion. The patient is aware of the precancerous quality and need for treatment of these lesions. Liquid nitrogen cryosurgery is applied to the 8 actinic keratoses, as detailed in the physical exam, to produce a freeze injury. The patient is aware that blisters may form and is instructed on wound care with gentle cleansing and use of vaseline ointment to keep moist until healed. The patient is supplied a handout on cryosurgery and is instructed to call if lesions do not completely resolve.     Asteatotic eczema  -     clobetasoL (TEMOVATE) 0.05 % external solution; Pt to mix in 1 jar of cerave cream and apply to affected areas bid  Dispense: 50 mL; Refill: 3  Good skin care regimen discussed including limiting to one bath or shower/day, using lukewarm water with mild soap and moisturizing cream to skin 1 - 2x/day. Brochure was provided and reviewed with patient.      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   Reassurance given to patient. No treatment necessary.       SK (seborrheic keratosis)   - minor problem and chronic.   Reassurance given to patient. No treatment  necessary.      History of nonmelanoma skin cancer  Area(s) of previous NMSC evaluated with no signs of recurrence.    Upper body skin examination performed today including at least 6 points as noted in physical examination. No lesions suspicious for malignancy noted.    Recommend daily sun protection/avoidance and use of at least SPF 30, broad spectrum sunscreen (OTC drug).             Follow up in about 6 months (around 11/2/2024) for per patient request.

## 2024-05-02 NOTE — PATIENT INSTRUCTIONS
CRYOSURGERY      Your doctor has used a method called cryosurgery to treat your skin condition. Cryosurgery refers to the use of very cold substances to treat a variety of skin conditions such as warts, pre-skin cancers, molluscum contagiosum, sun spots, and several benign growths. The substance we use in cryosurgery is liquid nitrogen and is so cold (-195 degrees Celsius) that is burns when administered.     Following treatment in the office, the skin may immediately burn and become red. You may find the area around the lesion is affected as well. It is sometimes necessary to treat not only the lesion, but a small area of the surrounding normal skin to achieve a good response.     A blister, and even a blood filled blister, may form after treatment.   This is a normal response. If the blister is painful, it is acceptable to sterilize a needle and with rubbing alcohol and gently pop the blister. It is important that you gently wash the area with soap and warm water as the blister fluid may contain wart virus if a wart was treated. Do no remove the roof of the blister.     The area treated can take anywhere from 1-3 weeks to heal. Healing time depends on the kind skin lesion treated, the location, and how aggressively the lesion was treated. It is recommended that the areas treated are covered with Vaseline or bacitracin ointment and a band-aid. If a band-aid is not practical, just ointment applied several times per day will do. Keeping these areas moist will speed the healing time.    Treatment with liquid nitrogen can leave a scar. In dark skin, it may be a light or dark scar, in light skin it may be a white or pink scar. These will generally fade with time, but may never go away completely.     If you have any concerns after your treatment, please feel free to call the office.       [address] [place] [address]/ [phone] FAX/ [email or web address] XEROSIS (DRY  SKIN)        Definition    Xerosis is the term for dry skin.  We all have a natural oil coating over our skin produced by the skin oil glands.  If this oil is removed, the skin becomes dry which can lead to cracking, which can lead to inflammation.  Xerosis is usually a long-term problem that recurs often, especially in the winter.    Cause    Long hot baths or showers can remove our natural oil and lead to xerosis.  One should never take more than one bath or shower a day and for no longer than ten minutes.  Use of harsh soaps such as Zest, Dial, and Ivory can worsen and cause xerosis.  Cold winter weather worsens xerosis because the amount of moisture contained in cold air is much less than the amount of moisture in warm air.    Treatment    Treatment is intended to restore the natural oil to your skin.  Keep the skin lubricated.    Do not take more than one bath or shower a day.  Use lukewarm water, not hot.  Hot water dries out the skin.    Use a gentle moisturizing soap such as Cetaphil soap, Oil of Olay, Dove, Basis, Ivory moisture care, Restoraderm cleanser.    When toweling dry, dont rub.  Blot the skin so there is still some water left on the skin.  You should apply a moisturizing cream to all of the skin such as Cerave cream, Cetaphil cream, Lipikar Kent AP+ Intense Repair Moisturizing Cream or Restoraderm or Eucerin Original Formula cream.   Alpha hydroxyacid lotions, i.e., AmLactin, also work very well for preventing dry skin, but may burn when used on inflamed or reddened skin.    If you like to swim during the winter months, you should not use soap when getting out of the pool.  When you have finished swimming, rinse off the chlorine with cool to warm water.  If this will be the only shower of the day, then you may use Cetaphil or another mild soap to cleanse your skin.  After the shower, apply a moisturizing cream to all of the skin as above.        [address] [place] [address]/  [phone] FAX/ [email or web address]

## 2024-09-10 ENCOUNTER — OFFICE VISIT (OUTPATIENT)
Dept: DERMATOLOGY | Facility: CLINIC | Age: 89
End: 2024-09-10
Payer: MEDICARE

## 2024-09-10 DIAGNOSIS — D48.5 NEOPLASM OF UNCERTAIN BEHAVIOR OF SKIN: Primary | ICD-10-CM

## 2024-09-10 DIAGNOSIS — L82.1 SK (SEBORRHEIC KERATOSIS): ICD-10-CM

## 2024-09-10 DIAGNOSIS — L57.0 AK (ACTINIC KERATOSIS): ICD-10-CM

## 2024-09-10 PROCEDURE — 17000 DESTRUCT PREMALG LESION: CPT | Mod: S$PBB,XS,, | Performed by: DERMATOLOGY

## 2024-09-10 PROCEDURE — 99212 OFFICE O/P EST SF 10 MIN: CPT | Mod: 25,S$PBB,, | Performed by: DERMATOLOGY

## 2024-09-10 PROCEDURE — 17003 DESTRUCT PREMALG LES 2-14: CPT | Mod: S$PBB,,, | Performed by: DERMATOLOGY

## 2024-09-10 PROCEDURE — 17000 DESTRUCT PREMALG LESION: CPT | Mod: 59,PBBFAC | Performed by: DERMATOLOGY

## 2024-09-10 PROCEDURE — 99213 OFFICE O/P EST LOW 20 MIN: CPT | Mod: PBBFAC | Performed by: DERMATOLOGY

## 2024-09-10 PROCEDURE — 99999 PR PBB SHADOW E&M-EST. PATIENT-LVL III: CPT | Mod: PBBFAC,,, | Performed by: DERMATOLOGY

## 2024-09-10 PROCEDURE — 88305 TISSUE EXAM BY PATHOLOGIST: CPT | Performed by: PATHOLOGY

## 2024-09-10 PROCEDURE — 11102 TANGNTL BX SKIN SINGLE LES: CPT | Mod: PBBFAC | Performed by: DERMATOLOGY

## 2024-09-10 PROCEDURE — 11102 TANGNTL BX SKIN SINGLE LES: CPT | Mod: S$PBB,,, | Performed by: DERMATOLOGY

## 2024-09-10 PROCEDURE — 17003 DESTRUCT PREMALG LES 2-14: CPT | Mod: PBBFAC | Performed by: DERMATOLOGY

## 2024-09-10 NOTE — PROGRESS NOTES
Subjective:      Patient ID:  Aleks Brown is a 89 y.o. male who presents for   Chief Complaint   Patient presents with    Lesion     History of Present Illness: The patient presents for follow up of skin check.    The patient was last seen on: 5/2/2024 for cryosurgery to actinic keratoses which have resolved and using cerave cream mixed with clob soln as needed for asteatotic eczema on chest and arms. Works well.   This is a high risk patient here to check for the development of new lesions.     Other skin complaints:   Patient with new area of concern:   Location: Bilateral ears. X 1 month. No s/s.  Previous treatments: none        Review of Systems   Skin:  Positive for activity-related sunscreen use and wears hat (always). Negative for daily sunscreen use and recent sunburn.   Hematologic/Lymphatic: Bruises/bleeds easily (on asa).       Objective:   Physical Exam   Constitutional: He appears well-developed and well-nourished. No distress.   Neurological: He is alert and oriented to person, place, and time. He is not disoriented.   Psychiatric: He has a normal mood and affect.   Skin:   Areas Examined (abnormalities noted in diagram):   Head / Face Inspection Performed                         Diagram Legend     Erythematous scaling macule/papule c/w actinic keratosis       Vascular papule c/w angioma      Pigmented verrucoid papule/plaque c/w seborrheic keratosis      Yellow umbilicated papule c/w sebaceous hyperplasia      Irregularly shaped tan macule c/w lentigo     1-2 mm smooth white papules consistent with Milia      Movable subcutaneous cyst with punctum c/w epidermal inclusion cyst      Subcutaneous movable cyst c/w pilar cyst      Firm pink to brown papule c/w dermatofibroma      Pedunculated fleshy papule(s) c/w skin tag(s)      Evenly pigmented macule c/w junctional nevus     Mildly variegated pigmented, slightly irregular-bordered macule c/w mildly atypical nevus      Flesh colored to evenly  pigmented papule c/w intradermal nevus       Pink pearly papule/plaque c/w basal cell carcinoma      Erythematous hyperkeratotic cursted plaque c/w SCC      Surgical scar with no sign of skin cancer recurrence      Open and closed comedones      Inflammatory papules and pustules      Verrucoid papule consistent consistent with wart     Erythematous eczematous patches and plaques     Dystrophic onycholytic nail with subungual debris c/w onychomycosis     Umbilicated papule    Erythematous-base heme-crusted tan verrucoid plaque consistent with inflamed seborrheic keratosis     Erythematous Silvery Scaling Plaque c/w Psoriasis     See annotation            Assessment / Plan:      Pathology Orders:       Normal Orders This Visit    Specimen to Pathology, Dermatology     Questions:    Procedure Type: Dermatology and skin neoplasms    Number of Specimens: 1    ------------------------: -------------------------    Spec 1 Procedure: Biopsy    Spec 1 Clinical Impression: r/o bcc    Spec 1 Source: left post ear    Release to patient: Immediate    Release to patient:           Neoplasm of uncertain behavior of skin  -     Specimen to Pathology, Dermatology    Shave biopsy procedure note:    Shave biopsy performed after verbal consent including risk of infection, scar, recurrence, need for additional treatment of site. Area prepped with alcohol, anesthetized with approximately 1.0cc of 1% lidocaine with epinephrine. Lesional tissue shaved with razor blade. Hemostasis achieved with application of aluminum chloride followed by hyfrecation. No complications. Dressing applied. Wound care explained.    If biopsy positive for malignancy, will treat with ED and C      AK (actinic keratosis)  Cryosurgery Procedure Note    Verbal consent from the patient is obtained including, but not limited to, risk of hypopigmentation/hyperpigmentation, scar, recurrence of lesion. The patient is aware of the precancerous quality and need for  treatment of these lesions. Liquid nitrogen cryosurgery is applied to the 4 actinic keratoses, as detailed in the physical exam, to produce a freeze injury. The patient is aware that blisters may form and is instructed on wound care with gentle cleansing and use of vaseline ointment to keep moist until healed. The patient is supplied a handout on cryosurgery and is instructed to call if lesions do not completely resolve.      SK (seborrheic keratosis)  These are benign inherited growths without a malignant potential. Reassurance given to patient. No treatment is necessary.                No follow-ups on file.

## 2024-09-10 NOTE — PATIENT INSTRUCTIONS
Shave Biopsy Wound Care    Your doctor has performed a shave biopsy today.  A band aid and vaseline ointment has been placed over the site.  This should remain in place for NO LONGER THAN 48 hours.  It is fine to remove the bandaid after 24 hours, if the area is no longer bleeding. It is recommended that you keep the area dry (do not wet)) for the first 24 hours.  After 24 hours, wash the area with warm soap and water and apply Vaseline jelly.  Many patients prefer to use Neosporin or Bacitracin ointment.  This is acceptable; however, know that you can develop an allergy to this medication even if you have used it safely for years.  It is important to keep the area moist.  Letting it dry out and get air slows healing time, and will worsen the scar.        If you notice increasing redness, tenderness, pain, or yellow drainage at the biopsy site, please notify your doctor.  These are signs of an infection.    If your biopsy site is bleeding, apply firm pressure for 15 minutes straight.  Repeat for another 15 minutes, if it is still bleeding.   If the surgical site continues to bleed, then please contact your doctor.      For [facility] users:   You will receive your biopsy results in [facility] as soon as they are available. Please be assured that your physician/provider will review your results and will then determine what further treatment, evaluation, or planning is required. You should be contacted by your physician's/provider's office within 5 business days of receiving your results; If not, please reach out to directly. This is one more way "[employer]mohinder is putting you first.     [facility] [place] [address]/ [phone] FAX/ [email or web address]         CRYOSURGERY      Your doctor has used a method called cryosurgery to treat your skin condition. Cryosurgery refers to the use of very cold substances to treat a variety of skin conditions such as warts, pre-skin cancers, molluscum  contagiosum, sun spots, and several benign growths. The substance we use in cryosurgery is liquid nitrogen and is so cold (-195 degrees Celsius) that is burns when administered.     Following treatment in the office, the skin may immediately burn and become red. You may find the area around the lesion is affected as well. It is sometimes necessary to treat not only the lesion, but a small area of the surrounding normal skin to achieve a good response.     A blister, and even a blood filled blister, may form after treatment.   This is a normal response. If the blister is painful, it is acceptable to sterilize a needle and with rubbing alcohol and gently pop the blister. It is important that you gently wash the area with soap and warm water as the blister fluid may contain wart virus if a wart was treated. Do no remove the roof of the blister.     The area treated can take anywhere from 1-3 weeks to heal. Healing time depends on the kind skin lesion treated, the location, and how aggressively the lesion was treated. It is recommended that the areas treated are covered with Vaseline or bacitracin ointment and a band-aid. If a band-aid is not practical, just ointment applied several times per day will do. Keeping these areas moist will speed the healing time.    Treatment with liquid nitrogen can leave a scar. In dark skin, it may be a light or dark scar, in light skin it may be a white or pink scar. These will generally fade with time, but may never go away completely.     If you have any concerns after your treatment, please feel free to call the office.       [facility] [place] [address]/ [phone] FAX/ [email or web address]

## 2024-09-13 LAB
FINAL PATHOLOGIC DIAGNOSIS: NORMAL
GROSS: NORMAL
Lab: NORMAL
MICROSCOPIC EXAM: NORMAL

## 2024-09-16 ENCOUNTER — OFFICE VISIT (OUTPATIENT)
Dept: DERMATOLOGY | Facility: CLINIC | Age: 89
End: 2024-09-16
Payer: MEDICARE

## 2024-09-16 DIAGNOSIS — C44.219 BASAL CELL CARCINOMA (BCC) OF LEFT EAR: Primary | ICD-10-CM

## 2024-09-16 PROCEDURE — 99499 UNLISTED E&M SERVICE: CPT | Mod: S$PBB,,, | Performed by: DERMATOLOGY

## 2024-09-16 PROCEDURE — 17282 DSTR MAL LS F/E/E/N/L/M1.1-2: CPT | Mod: PBBFAC | Performed by: DERMATOLOGY

## 2024-09-16 PROCEDURE — 99999 PR PBB SHADOW E&M-EST. PATIENT-LVL I: CPT | Mod: PBBFAC,,, | Performed by: DERMATOLOGY

## 2024-09-16 PROCEDURE — 99211 OFF/OP EST MAY X REQ PHY/QHP: CPT | Mod: PBBFAC,25 | Performed by: DERMATOLOGY

## 2024-09-16 PROCEDURE — 17282 DSTR MAL LS F/E/E/N/L/M1.1-2: CPT | Mod: S$PBB,79,, | Performed by: DERMATOLOGY

## 2024-09-16 NOTE — PROGRESS NOTES
Here for electrodesiccation and curettage of bcc on the left post ear. bx done on 9/10/24:       Final Pathologic Diagnosis   Date Value Ref Range Status   09/10/2024   Final    Skin, left posterior ear, shave biopsy:  -BASAL CELL CARCINOMA, NODULAR AND INFILTRATIVE, EXTENDING TO THE PERIPHERAL AND DEEP BIOPSY EDGES    This lesion is skin cancer. You will be contacted regarding treatment.       Comment:     Interp By Iain Garrido M.D., Signed on 09/13/2024 at 09:43         Electrodessication and Curettage Procedure note:    Verbal consent obtained. Lesional tissue marked and prepped with alcohol. Lesion anesthetized with 1% lidocaine with epinephrine. Curettage and Desiccation x 3 cycles to base. Aluminum chloride for hemostasis. Lesion size after primary curettage: 1.5 cm    Area bandaged and wound care explained.    F/u 3 months

## 2024-09-16 NOTE — PATIENT INSTRUCTIONS

## 2024-09-25 DIAGNOSIS — Z00.00 ENCOUNTER FOR MEDICARE ANNUAL WELLNESS EXAM: ICD-10-CM

## 2024-11-15 ENCOUNTER — OFFICE VISIT (OUTPATIENT)
Dept: HOME HEALTH SERVICES | Facility: CLINIC | Age: 89
End: 2024-11-15
Payer: MEDICARE

## 2024-11-15 VITALS
DIASTOLIC BLOOD PRESSURE: 62 MMHG | SYSTOLIC BLOOD PRESSURE: 108 MMHG | HEIGHT: 72 IN | BODY MASS INDEX: 26.68 KG/M2 | WEIGHT: 197 LBS | HEART RATE: 58 BPM

## 2024-11-15 DIAGNOSIS — N18.31 CHRONIC KIDNEY DISEASE, STAGE 3A: ICD-10-CM

## 2024-11-15 DIAGNOSIS — Z85.828 HISTORY OF SCC (SQUAMOUS CELL CARCINOMA) OF SKIN: ICD-10-CM

## 2024-11-15 DIAGNOSIS — R26.9 ABNORMALITY OF GAIT AND MOBILITY: ICD-10-CM

## 2024-11-15 DIAGNOSIS — G62.9 SENSORIMOTOR NEUROPATHY: ICD-10-CM

## 2024-11-15 DIAGNOSIS — I65.23 BILATERAL CAROTID ARTERY STENOSIS: ICD-10-CM

## 2024-11-15 DIAGNOSIS — E78.5 DYSLIPIDEMIA: ICD-10-CM

## 2024-11-15 DIAGNOSIS — I25.10 CORONARY ARTERY DISEASE INVOLVING NATIVE CORONARY ARTERY OF NATIVE HEART WITHOUT ANGINA PECTORIS: ICD-10-CM

## 2024-11-15 DIAGNOSIS — Z00.00 ENCOUNTER FOR PREVENTIVE HEALTH EXAMINATION: Primary | ICD-10-CM

## 2024-11-15 DIAGNOSIS — D69.2 SENILE PURPURA: ICD-10-CM

## 2024-11-15 DIAGNOSIS — I10 ESSENTIAL HYPERTENSION: ICD-10-CM

## 2024-11-15 DIAGNOSIS — Z99.89 DEPENDENCE ON OTHER ENABLING MACHINES AND DEVICES: ICD-10-CM

## 2024-11-15 DIAGNOSIS — I70.0 CALCIFICATION OF AORTA: ICD-10-CM

## 2024-11-15 DIAGNOSIS — F43.10 PTSD (POST-TRAUMATIC STRESS DISORDER): ICD-10-CM

## 2024-11-15 PROCEDURE — G0439 PPPS, SUBSEQ VISIT: HCPCS | Mod: S$GLB,,, | Performed by: NURSE PRACTITIONER

## 2024-11-15 NOTE — PATIENT INSTRUCTIONS
Counseling and Referral of Other Preventative  (Italic type indicates deductible and co-insurance are waived)    Patient Name: Aleks Brown  Today's Date: 11/15/2024    Health Maintenance       Date Due Completion Date    RSV Vaccine (Age 60+ and Pregnant patients) (1 - 1-dose 75+ series) Never done ---    Aspirin/Antiplatelet Therapy 11/15/2025 11/15/2024    TETANUS VACCINE 09/14/2028 9/14/2018    Lipid Panel 02/08/2029 2/8/2024        No orders of the defined types were placed in this encounter.      The following information is provided to all patients.  This information is to help you find resources for any of the problems found today that may be affecting your health:                  Living healthy guide: www.[facility].[place].gov      Understanding Diabetes: www.diabetes.org      Eating healthy: www.cdc.gov/healthyweight      [facility] home safety checklist: www.cdc.gov/steadi/patient.html      Agency on Aging: www.goea.[place].[facility]      Alcoholics anonymous (AA): www.aa.org      Physical Activity: www.dionicio.nih.gov/zs0moll      Tobacco use: www.quitwithusla.org

## 2024-11-15 NOTE — PROGRESS NOTES
Aleks Brown presented for an initial Medicare AWV today. The following components were reviewed and updated:    Medical history  Family History  Social history  Allergies and Current Medications  Health Risk Assessment  Health Maintenance  Care Team    **See Completed Assessments for Annual Wellness visit with in the encounter summary    The following assessments were completed:  Depression Screening  Cognitive function Screening    Timed Get Up Test  Whisper Test      Opioid documentation:      Patient does not have a current opioid prescription.          Vitals:    11/15/24 1040   BP: 108/62   Patient Position: Sitting   Pulse: (!) 58   Weight: 89.4 kg (197 lb)   Height: 6' (1.829 m)     Body mass index is 26.72 kg/m².       Physical Exam  Constitutional:       Appearance: Normal appearance.   HENT:      Head: Normocephalic and atraumatic.      Nose: Nose normal.      Mouth/Throat:      Mouth: Mucous membranes are moist.   Eyes:      Extraocular Movements: Extraocular movements intact.   Cardiovascular:      Rate and Rhythm: Regular rhythm. Bradycardia present.      Heart sounds: Normal heart sounds.   Pulmonary:      Effort: Pulmonary effort is normal. No respiratory distress.      Breath sounds: Normal breath sounds.   Abdominal:      General: Bowel sounds are normal. There is no distension.      Palpations: Abdomen is soft.   Musculoskeletal:         General: No swelling. Normal range of motion.      Cervical back: Normal range of motion. No rigidity.   Skin:     General: Skin is warm and dry.      Findings: Bruising (right forearm) present.   Neurological:      General: No focal deficit present.      Mental Status: He is alert and oriented to person, place, and time.      Gait: Gait abnormal (cane present).   Psychiatric:         Mood and Affect: Mood normal.         Behavior: Behavior normal.           Diagnoses and health risks identified today and associated recommendations/orders:  1. Encounter for  preventive health examination  Assessments completed. Preventive measures, health maintenance, and immunizations reviewed with patient and patients spouse.    2. Chronic kidney disease, stage 3a  Stable, followed by PCP.    3. Calcification of aorta  Stable, patient on Aspirin. Followed by PCP.    4. Coronary artery disease involving native coronary artery of native heart without angina pectoris  Stable, patient on Aspirin. Followed by PCP.    5. Bilateral carotid artery stenosis  Stable, followed by PCP.    6. Senile purpura  Stable, followed by PCP.      7. 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    8. PTSD (post-traumatic stress disorder); short temper; avoids crowds. [facility]  Stable, [facility].     9. Essential hypertension  Stable, patient on Lisinopril and Toprol-XL. Followed by PCP.    10. Dyslipidemia  Stable, patient on Lipitor. Followed by PCP.    11. 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    12. Abnormality of gait and mobility  Stable, patient has cane for mobility. Followed by PCP.    13. Dependence on other enabling machines and devices  Stable, followed by PCP.      Provided Aleks with a 5-10 year written screening schedule and personal prevention plan. Recommendations were developed using the USPSTF age appropriate recommendations. Education, counseling, and referrals were provided as needed.  After Visit Summary printed and given to patient which includes a list of additional screenings\tests needed.    Follow up in about 1 year (around 11/15/2025) for your next annual wellness visit.      Melissa Riojas NP  I offered to discuss advanced care planning, including how to pick a person who would make decisions for you if you were unable to make them for yourself, called a health care power of , and what kind of decisions you might make such as use of life sustaining  treatments such as ventilators and tube feeding when faced with a life limiting illness recorded on a living will that they will need to know. (How you want to be cared for as you near the end of your natural life)     X Patient is interested in learning more about how to make advanced directives.  I provided them paperwork and offered to discuss this with them.

## 2024-12-04 NOTE — PROGRESS NOTES
This note was created by combination of typed  and M-Modal dictation.  Transcription errors may be present.   This note was also generated with the assistance of ambient listening technology. Verbal consent was obtained by the patient and accompanying visitor(s) for the recording of patient appointment to facilitate this note. I attest to having reviewed and edited the generated note for accuracy, though some syntax or spelling errors may persist. Please contact the author of this note for any clarification.    Assessment and Plan:   Assessment and Plan   Constipation, unspecified constipation type  -notes constipation for the past 4 months with changes in stool morphology but no gross blood no abdominal pain no loss of appetite no weight changes.  We talked about further workup including imaging or colonoscopy, shared decision-making he is okay not to pursue.    Recommend over-the-counter Colace or Dulcolax.  If no improvement can step up to MiraLax.    Essential hypertension  Chronic kidney disease, stage 3a  -BP today stable.  Update prescriptions for metoprolol and lisinopril  -     metoprolol succinate (TOPROL-XL) 100 MG 24 hr tablet; Take 1 tablet (100 mg total) by mouth once daily.  Dispense: 90 tablet; Refill: 3  -     lisinopriL 10 MG tablet; Take 1 tablet (10 mg total) by mouth once daily.  Dispense: 90 tablet; Refill: 3    Abnormal glucose  -check future surveillance A1c    Dyslipidemia  Bilateral carotid artery stenosis  Coronary artery disease involving native coronary artery of native heart without angina pectoris  -check future labs on statin  -     atorvastatin (LIPITOR) 40 MG tablet; Take 1 tablet (40 mg total) by mouth once daily.  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 [facility]  [facility]    Obstructive sleep apnea 7/18/22 HST with mod NATTY AHI 20; intolerant of CPAP      Medications Discontinued During This Encounter   Medication Reason    tiZANidine (ZANAFLEX) 2 MG tablet     atorvastatin (LIPITOR) 40 MG tablet Reorder    lisinopriL 10 MG tablet Reorder    metoprolol succinate (TOPROL-XL) 100 MG 24 hr tablet Reorder       meds sent this encounter:  Medications Ordered This Encounter   Medications    atorvastatin (LIPITOR) 40 MG tablet     Sig: Take 1 tablet (40 mg total) by mouth once daily.     Dispense:  90 tablet     Refill:  3     Pharmacy update refills, keep on file, not requesting Rx to be filled today.    lisinopriL 10 MG tablet     Sig: Take 1 tablet (10 mg total) by mouth once daily.     Dispense:  90 tablet     Refill:  3     . Pharmacy update refills, keep on file, not requesting Rx to be filled today.    metoprolol succinate (TOPROL-XL) 100 MG 24 hr tablet     Sig: Take 1 tablet (100 mg total) by mouth once daily.     Dispense:  90 tablet     Refill:  3     . Pharmacy update refills, keep on file, not requesting Rx to be filled today.         Follow Up:  Follow-up 6 months with labs  Future Appointments   Date Time Provider Department Center   12/5/2024  3:40 PM Deshaun Goel MD [facility] [place]   12/16/2024  9:00 AM Gillian Williamson MD [facility] [place]          Subjective:   Subjective   Chief Complaint   Patient presents with    Follow-up       HPI  Aleks is a 89 y.o. male.     Social History     Tobacco Use    Smoking status: Former    Smokeless tobacco: Never   Substance Use Topics    Alcohol use: Yes     Comment: occasionally      Social History     Occupational History    Occupation: 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  .  Six kids. [place] [employer] vet.  [employer].         Social History     Social History Narrative     x 60 yr.  [place] [employer] [place].         Patient Care Team:  Deshaun Goel MD as PCP - General (Internal Medicine)  Magnus Rashid MD  as Consulting Physician (Dermatology)  Gillian Williamson MD as Consulting Physician (Dermatology)  C[place] Centeno PA-C (Internal Medicine)  Clinic, [facility]    Last appointment with this clinic was Visit date not found. Last visit with me 2/26/2024   To summarize last visit and events leading up to today:  Hypertension, CKD stage IIIA, hyperlipidemia, abnormal glucose, stable   Macrocytic anemia with alcohol use.  At the time he noted he had stop alcohol maybe for 5 months ago.    NATTY intolerant of CPAP  Eczema  area.  Lotrisone.    Benzodiazepine dependence.  Stable off of lorazepam  Frostbite with neuropathy.  [place] .       Last visit with me 8/15  Fecal incontinence episodes.  Trial Kegel exercises.  eGFR > 60, monitor.  Stable off of BZD     ED 1/9/24 c/o dizziness  CT head negative.     Pre visit labs  CBC anemia macrocytic, stable  CMP creatinine 1.2 CKD 3a  Lipid good  A1c 5.7    Last visit me 02/26/2024  Dermatitis trial of Lotrisone  Hypertension CKD 3A stable.  Not taking Lasix.  Stay off of it.  Lisinopril and metoprolol  Hyperlipidemia coronary artery disease on statin stable   Abnormal glucose monitor   [facility]    Subsequent visits with Dermatology, [facility] and [facility], skin lesion of the ear removed, BCC    Labs ordered due February    Today's visit:    History of Present Illness    SOCIAL HISTORY:  -  History: [employer]     HPI:  Aleks reports constipation for the past 4-6 months with daily bowel movements that are difficult to pass and require straining. He describes the stools as hard and small, which is a change from his usual pattern. Aleks denies associated abdominal pain, changes in appetite, blood in stool, or weight changes. His wife purchased an OTC gummy supplement, presumed to be a stool softener, but he has not noticed any improvement with its inconsistent use.    Aleks had recent ear surgery for skin cancer, approximately his  fifth occurrence. The surgical site was located in a crease, making it prone to bleeding, which has since resolved.    He uses a cane due to issues with his right leg, which he cannot fully weight-bear on. [facility]. During his last visit 2 weeks ago, they suggested he join an exercise group and adjusted his cane measurements.    Aleks reports nocturia every 2.5 hours, noting decreased bladder capacity.    MEDICATIONS:  - Metoprolol, for blood pressure and heart  - Atorvastatin, for cholesterol  - Amlodipine, for blood pressure  - Aspirin (low-dose), for heart      ROS:  General: no weight loss  ENT: no ear pain, no ear discharge  Gastrointestinal: no abdominal pain, reports constipation  Genitourinary: reports nocturia         ALLERGIES AND MEDICATIONS: updated and reviewed.  Medication List with Changes/Refills   Current Medications    ASPIRIN (ECOTRIN) 81 MG EC TABLET    Take 81 mg by mouth once daily.     ATORVASTATIN (LIPITOR) 40 MG TABLET    Take 1 tablet (40 mg total) by mouth once daily.    CHOLECALCIFEROL, VITAMIN D3, (VITAMIN D3) 50 MCG (2,000 UNIT) TAB    Take 1 tablet by mouth once daily.    CLOBETASOL (TEMOVATE) 0.05 % EXTERNAL SOLUTION    Pt to mix in 1 jar of cerave cream and apply to affected areas bid    CLOTRIMAZOLE-BETAMETHASONE 1-0.05% (LOTRISONE) CREAM    Apply topically 2 (two) times daily.    LISINOPRIL 10 MG TABLET    Take 1 tablet (10 mg total) by mouth once daily.    METOPROLOL SUCCINATE (TOPROL-XL) 100 MG 24 HR TABLET    Take 1 tablet (100 mg total) by mouth once daily.    MULTIVITAMIN ORAL    Take 1 tablet by mouth once daily.    NITROGLYCERIN (NITROSTAT) 0.4 MG SL TABLET    Place 1 tablet under the tongue as needed.    TIZANIDINE (ZANAFLEX) 2 MG TABLET    Take 1 tablet (2 mg total) by mouth nightly as needed (back pain).         Objective:   Objective   Physical Exam   Vitals:    12/05/24 1525   BP: 130/62   Pulse: 60   Temp: 97.7 °F (36.5  °C)   TempSrc: Oral   SpO2: 96%   Weight: 91.7 kg (202 lb 2.6 oz)   Height: 6' (1.829 m)    Body mass index is 27.42 kg/m².  Weight: 91.7 kg (202 lb 2.6 oz)   Height: 6' (182.9 cm)     Physical Exam  Constitutional:       General: He is not in acute distress.     Appearance: Normal appearance. He is well-developed.   HENT:      Right Ear: Tympanic membrane and external ear normal.      Left Ear: Tympanic membrane and external ear normal.      Nose: Nose normal.   Eyes:      General: No scleral icterus.     Extraocular Movements: Extraocular movements intact.      Conjunctiva/sclera: Conjunctivae normal.   Neck:      Thyroid: No thyroid mass or thyromegaly.      Trachea: Trachea normal.   Cardiovascular:      Rate and Rhythm: Normal rate and regular rhythm.      Heart sounds: Normal heart sounds, S1 normal and S2 normal. No murmur heard.  Pulmonary:      Effort: Pulmonary effort is normal.      Breath sounds: Normal breath sounds.   Abdominal:      General: There is no distension.      Palpations: Abdomen is soft. There is no hepatomegaly, splenomegaly or mass.      Tenderness: There is no abdominal tenderness.   Musculoskeletal:         General: No deformity. Normal range of motion.      Right lower leg: Edema present.      Left lower leg: Edema present.      Comments: Mild edema to the ankles bilaterally.  Exquisitely tender   Lymphadenopathy:      Cervical: No cervical adenopathy.   Skin:     General: Skin is warm and dry.      Findings: No rash (on exposed skin).      Comments: On exposed skin   Neurological:      Mental Status: He is alert and oriented to person, place, and time.      Cranial Nerves: No cranial nerve deficit.      Sensory: No sensory deficit.      Coordination: Coordination normal.      Deep Tendon Reflexes: Reflexes are normal and symmetric.   Psychiatric:         Speech: Speech normal.         Behavior: Behavior normal.         Thought Content: Thought content normal.         Judgment:  Judgment normal.

## 2024-12-05 ENCOUNTER — OFFICE VISIT (OUTPATIENT)
Dept: FAMILY MEDICINE | Facility: CLINIC | Age: 89
End: 2024-12-05
Payer: MEDICARE

## 2024-12-05 VITALS
HEART RATE: 60 BPM | TEMPERATURE: 98 F | DIASTOLIC BLOOD PRESSURE: 62 MMHG | HEIGHT: 72 IN | BODY MASS INDEX: 27.39 KG/M2 | WEIGHT: 202.19 LBS | OXYGEN SATURATION: 96 % | SYSTOLIC BLOOD PRESSURE: 130 MMHG

## 2024-12-05 DIAGNOSIS — K59.00 CONSTIPATION, UNSPECIFIED CONSTIPATION TYPE: Primary | ICD-10-CM

## 2024-12-05 DIAGNOSIS — F43.10 PTSD (POST-TRAUMATIC STRESS DISORDER): ICD-10-CM

## 2024-12-05 DIAGNOSIS — I65.23 BILATERAL CAROTID ARTERY STENOSIS: ICD-10-CM

## 2024-12-05 DIAGNOSIS — I10 ESSENTIAL HYPERTENSION: ICD-10-CM

## 2024-12-05 DIAGNOSIS — N18.31 CHRONIC KIDNEY DISEASE, STAGE 3A: ICD-10-CM

## 2024-12-05 DIAGNOSIS — E78.5 DYSLIPIDEMIA: ICD-10-CM

## 2024-12-05 DIAGNOSIS — M21.371 RIGHT FOOT DROP: ICD-10-CM

## 2024-12-05 DIAGNOSIS — R73.09 ABNORMAL GLUCOSE: ICD-10-CM

## 2024-12-05 DIAGNOSIS — G62.9 SENSORIMOTOR NEUROPATHY: ICD-10-CM

## 2024-12-05 DIAGNOSIS — I25.10 CORONARY ARTERY DISEASE INVOLVING NATIVE CORONARY ARTERY OF NATIVE HEART WITHOUT ANGINA PECTORIS: ICD-10-CM

## 2024-12-05 PROCEDURE — 99214 OFFICE O/P EST MOD 30 MIN: CPT | Mod: S$PBB,,, | Performed by: INTERNAL MEDICINE

## 2024-12-05 PROCEDURE — 99214 OFFICE O/P EST MOD 30 MIN: CPT | Mod: PBBFAC,PO | Performed by: INTERNAL MEDICINE

## 2024-12-05 PROCEDURE — 99999 PR PBB SHADOW E&M-EST. PATIENT-LVL IV: CPT | Mod: PBBFAC,,, | Performed by: INTERNAL MEDICINE

## 2024-12-05 RX ORDER — LISINOPRIL 10 MG/1
10 TABLET ORAL DAILY
Qty: 90 TABLET | Refills: 3 | Status: SHIPPED | OUTPATIENT
Start: 2024-12-05

## 2024-12-05 RX ORDER — METOPROLOL SUCCINATE 100 MG/1
100 TABLET, EXTENDED RELEASE ORAL DAILY
Qty: 90 TABLET | Refills: 3 | Status: SHIPPED | OUTPATIENT
Start: 2024-12-05

## 2024-12-05 RX ORDER — ATORVASTATIN CALCIUM 40 MG/1
40 TABLET, FILM COATED ORAL DAILY
Qty: 90 TABLET | Refills: 3 | Status: SHIPPED | OUTPATIENT
Start: 2024-12-05

## 2024-12-05 NOTE — PATIENT INSTRUCTIONS
Get some colace (docusate) or dulcolax - these are stool softeners.  Take daily.  Drink an extra glass of water with this.    The next step would be miralax - to take daily - it's stronger than colace or dulcolax    If you see blood, start having pain - contact me.

## 2024-12-11 ENCOUNTER — HOSPITAL ENCOUNTER (OUTPATIENT)
Facility: HOSPITAL | Age: 89
Discharge: HOME | End: 2024-12-12
Attending: EMERGENCY MEDICINE | Admitting: INTERNAL MEDICINE
Payer: MEDICARE

## 2024-12-11 DIAGNOSIS — I21.4 NSTEMI (NON-ST ELEVATION MYOCARDIAL INFARCTION): ICD-10-CM

## 2024-12-11 DIAGNOSIS — I21.4 NON-ST ELEVATION MYOCARDIAL INFARCTION (NSTEMI): ICD-10-CM

## 2024-12-11 DIAGNOSIS — R07.9 CHEST PAIN: ICD-10-CM

## 2024-12-11 LAB
ALBUMIN SERPL BCP-MCNC: 3.3 G/DL (ref 3.5–5.2)
ALP SERPL-CCNC: 81 U/L (ref 40–150)
ALT SERPL W/O P-5'-P-CCNC: 16 U/L (ref 10–44)
ANION GAP SERPL CALC-SCNC: 8 MMOL/L (ref 8–16)
AST SERPL-CCNC: 26 U/L (ref 10–40)
BASOPHILS # BLD AUTO: 0.05 K/UL (ref 0–0.2)
BASOPHILS NFR BLD: 0.6 % (ref 0–1.9)
BILIRUB SERPL-MCNC: 0.3 MG/DL (ref 0.1–1)
BNP SERPL-MCNC: 91 PG/ML (ref 0–99)
BUN SERPL-MCNC: 27 MG/DL (ref 10–30)
CALCIUM SERPL-MCNC: 9.3 MG/DL (ref 8.7–10.5)
CHLORIDE SERPL-SCNC: 111 MMOL/L (ref 95–110)
CO2 SERPL-SCNC: 20 MMOL/L (ref 23–29)
CREAT SERPL-MCNC: 1 MG/DL (ref 0.5–1.4)
D DIMER PPP IA.FEU-MCNC: 4.38 MG/L FEU
DIFFERENTIAL METHOD BLD: ABNORMAL
EOSINOPHIL # BLD AUTO: 0.1 K/UL (ref 0–0.5)
EOSINOPHIL NFR BLD: 1.8 % (ref 0–8)
ERYTHROCYTE [DISTWIDTH] IN BLOOD BY AUTOMATED COUNT: 12 % (ref 11.5–14.5)
EST. GFR  (NO RACE VARIABLE): >60 ML/MIN/1.73 M^2
GLUCOSE SERPL-MCNC: 119 MG/DL (ref 70–110)
HCT VFR BLD AUTO: 32.9 % (ref 40–54)
HGB BLD-MCNC: 10.9 G/DL (ref 14–18)
IMM GRANULOCYTES # BLD AUTO: 0.04 K/UL (ref 0–0.04)
IMM GRANULOCYTES NFR BLD AUTO: 0.5 % (ref 0–0.5)
LYMPHOCYTES # BLD AUTO: 1.1 K/UL (ref 1–4.8)
LYMPHOCYTES NFR BLD: 13.6 % (ref 18–48)
MCH RBC QN AUTO: 34.1 PG (ref 27–31)
MCHC RBC AUTO-ENTMCNC: 33.1 G/DL (ref 32–36)
MCV RBC AUTO: 103 FL (ref 82–98)
MONOCYTES # BLD AUTO: 0.9 K/UL (ref 0.3–1)
MONOCYTES NFR BLD: 11 % (ref 4–15)
NEUTROPHILS # BLD AUTO: 5.7 K/UL (ref 1.8–7.7)
NEUTROPHILS NFR BLD: 72.5 % (ref 38–73)
NRBC BLD-RTO: 0 /100 WBC
PLATELET # BLD AUTO: 226 K/UL (ref 150–450)
PMV BLD AUTO: 10.8 FL (ref 9.2–12.9)
POTASSIUM SERPL-SCNC: 4.1 MMOL/L (ref 3.5–5.1)
PROT SERPL-MCNC: 6.2 G/DL (ref 6–8.4)
RBC # BLD AUTO: 3.2 M/UL (ref 4.6–6.2)
SODIUM SERPL-SCNC: 139 MMOL/L (ref 136–145)
TROPONIN I SERPL DL<=0.01 NG/ML-MCNC: 21 NG/L (ref 0–35)
WBC # BLD AUTO: 7.88 K/UL (ref 3.9–12.7)

## 2024-12-11 PROCEDURE — 25000003 PHARM REV CODE 250: Performed by: EMERGENCY MEDICINE

## 2024-12-11 PROCEDURE — 83880 ASSAY OF NATRIURETIC PEPTIDE: CPT

## 2024-12-11 PROCEDURE — 84484 ASSAY OF TROPONIN QUANT: CPT

## 2024-12-11 PROCEDURE — 96374 THER/PROPH/DIAG INJ IV PUSH: CPT

## 2024-12-11 PROCEDURE — 87389 HIV-1 AG W/HIV-1&-2 AB AG IA: CPT | Performed by: PHYSICIAN ASSISTANT

## 2024-12-11 PROCEDURE — 85379 FIBRIN DEGRADATION QUANT: CPT

## 2024-12-11 PROCEDURE — 93005 ELECTROCARDIOGRAM TRACING: CPT

## 2024-12-11 PROCEDURE — 99285 EMERGENCY DEPT VISIT HI MDM: CPT | Mod: 25

## 2024-12-11 PROCEDURE — 93010 ELECTROCARDIOGRAM REPORT: CPT | Mod: ,,, | Performed by: INTERNAL MEDICINE

## 2024-12-11 PROCEDURE — 86803 HEPATITIS C AB TEST: CPT | Performed by: PHYSICIAN ASSISTANT

## 2024-12-11 PROCEDURE — 80053 COMPREHEN METABOLIC PANEL: CPT

## 2024-12-11 PROCEDURE — 85025 COMPLETE CBC W/AUTO DIFF WBC: CPT

## 2024-12-11 RX ORDER — FAMOTIDINE 10 MG/ML
20 INJECTION INTRAVENOUS
Status: COMPLETED | OUTPATIENT
Start: 2024-12-11 | End: 2024-12-11

## 2024-12-11 RX ADMIN — FAMOTIDINE 20 MG: 10 INJECTION INTRAVENOUS at 11:12

## 2024-12-12 VITALS
OXYGEN SATURATION: 99 % | RESPIRATION RATE: 19 BRPM | TEMPERATURE: 99 F | SYSTOLIC BLOOD PRESSURE: 148 MMHG | DIASTOLIC BLOOD PRESSURE: 68 MMHG | HEART RATE: 61 BPM | BODY MASS INDEX: 27.17 KG/M2 | HEIGHT: 72 IN | WEIGHT: 200.63 LBS

## 2024-12-12 PROBLEM — I10 BENIGN ESSENTIAL HTN: Status: ACTIVE | Noted: 2024-12-12

## 2024-12-12 PROBLEM — I25.700 CORONARY ARTERY DISEASE INVOLVING CORONARY BYPASS GRAFT WITH UNSTABLE ANGINA PECTORIS: Status: ACTIVE | Noted: 2024-12-12

## 2024-12-12 PROBLEM — R79.89 ELEVATED D-DIMER: Status: ACTIVE | Noted: 2024-12-12

## 2024-12-12 PROBLEM — I21.4 NSTEMI (NON-ST ELEVATED MYOCARDIAL INFARCTION): Status: ACTIVE | Noted: 2024-12-12

## 2024-12-12 PROBLEM — I25.10 CAD (CORONARY ARTERY DISEASE): Status: ACTIVE | Noted: 2024-12-12

## 2024-12-12 PROBLEM — K21.9 GERD (GASTROESOPHAGEAL REFLUX DISEASE): Status: ACTIVE | Noted: 2024-12-12

## 2024-12-12 LAB
ABO + RH BLD: NORMAL
ALBUMIN SERPL BCP-MCNC: 3.2 G/DL (ref 3.5–5.2)
ALP SERPL-CCNC: 81 U/L (ref 40–150)
ALT SERPL W/O P-5'-P-CCNC: 15 U/L (ref 10–44)
ANION GAP SERPL CALC-SCNC: 9 MMOL/L (ref 8–16)
APTT PPP: 26.3 SEC (ref 21–32)
APTT PPP: 26.3 SEC (ref 21–32)
APTT PPP: 37.4 SEC (ref 21–32)
ASCENDING AORTA: 3.15 CM
AST SERPL-CCNC: 26 U/L (ref 10–40)
AV AREA BY CONTINUOUS VTI: 2.8 CM2
AV INDEX (PROSTH): 0.66
AV LVOT MEAN GRADIENT: 2 MMHG
AV LVOT PEAK GRADIENT: 3 MMHG
AV MEAN GRADIENT: 5.2 MMHG
AV PEAK GRADIENT: 9 MMHG
AV VALVE AREA BY VELOCITY RATIO: 2.2 CM²
AV VALVE AREA: 2.8 CM2
AV VELOCITY RATIO: 0.53
BASOPHILS # BLD AUTO: 0.05 K/UL (ref 0–0.2)
BASOPHILS NFR BLD: 0.7 % (ref 0–1.9)
BILIRUB SERPL-MCNC: 0.4 MG/DL (ref 0.1–1)
BLD GP AB SCN CELLS X3 SERPL QL: NORMAL
BSA FOR ECHO PROCEDURE: 2.15 M2
BUN SERPL-MCNC: 28 MG/DL (ref 10–30)
CALCIUM SERPL-MCNC: 9.2 MG/DL (ref 8.7–10.5)
CHLORIDE SERPL-SCNC: 111 MMOL/L (ref 95–110)
CHOLEST SERPL-MCNC: 125 MG/DL (ref 120–199)
CHOLEST/HDLC SERPL: 3.1 {RATIO} (ref 2–5)
CO2 SERPL-SCNC: 19 MMOL/L (ref 23–29)
CREAT SERPL-MCNC: 0.9 MG/DL (ref 0.5–1.4)
CV ECHO LV RWT: 0.36 CM
DIFFERENTIAL METHOD BLD: ABNORMAL
DOP CALC AO PEAK VEL: 1.5 M/S
DOP CALC AO VTI: 34.4 CM
DOP CALC LVOT AREA: 4.2 CM2
DOP CALC LVOT DIAMETER: 2.3 CM
DOP CALC LVOT PEAK VEL: 0.8 M/S
DOP CALC LVOT STROKE VOLUME: 94.7 CM3
DOP CALCLVOT PEAK VEL VTI: 22.8 CM
E WAVE DECELERATION TIME: 383.27 MS
E/A RATIO: 0.67
E/E' RATIO: 9.38 M/S
ECHO EF ESTIMATED: 53 %
ECHO LV POSTERIOR WALL: 1 CM (ref 0.6–1.1)
EOSINOPHIL # BLD AUTO: 0.2 K/UL (ref 0–0.5)
EOSINOPHIL NFR BLD: 2.3 % (ref 0–8)
ERYTHROCYTE [DISTWIDTH] IN BLOOD BY AUTOMATED COUNT: 12.1 % (ref 11.5–14.5)
EST. GFR  (NO RACE VARIABLE): >60 ML/MIN/1.73 M^2
ESTIMATED AVG GLUCOSE: 114 MG/DL (ref 68–131)
FRACTIONAL SHORTENING: 27.3 % (ref 28–44)
GLUCOSE SERPL-MCNC: 103 MG/DL (ref 70–110)
HBA1C MFR BLD: 5.6 % (ref 4–5.6)
HCT VFR BLD AUTO: 33.1 % (ref 40–54)
HCV AB SERPL QL IA: NORMAL
HDLC SERPL-MCNC: 40 MG/DL (ref 40–75)
HDLC SERPL: 32 % (ref 20–50)
HGB BLD-MCNC: 10.6 G/DL (ref 14–18)
HIV 1+2 AB+HIV1 P24 AG SERPL QL IA: NORMAL
IMM GRANULOCYTES # BLD AUTO: 0.03 K/UL (ref 0–0.04)
IMM GRANULOCYTES NFR BLD AUTO: 0.4 % (ref 0–0.5)
INR PPP: 1 (ref 0.8–1.2)
INR PPP: 1 (ref 0.8–1.2)
INTERVENTRICULAR SEPTUM: 1.1 CM (ref 0.6–1.1)
LA MAJOR: 5.32 CM
LA MINOR: 5.66 CM
LA WIDTH: 4.84 CM
LDLC SERPL CALC-MCNC: 72.8 MG/DL (ref 63–159)
LEFT ATRIUM SIZE: 4.27 CM
LEFT ATRIUM VOLUME INDEX MOD: 45.5 ML/M2
LEFT ATRIUM VOLUME INDEX: 45.2 ML/M2
LEFT ATRIUM VOLUME MOD: 96.91 ML
LEFT ATRIUM VOLUME: 96.35 CM3
LEFT INTERNAL DIMENSION IN SYSTOLE: 4 CM (ref 2.1–4)
LEFT VENTRICLE DIASTOLIC VOLUME INDEX: 67.69 ML/M2
LEFT VENTRICLE DIASTOLIC VOLUME: 144.18 ML
LEFT VENTRICLE MASS INDEX: 106.8 G/M2
LEFT VENTRICLE SYSTOLIC VOLUME INDEX: 32.1 ML/M2
LEFT VENTRICLE SYSTOLIC VOLUME: 68.36 ML
LEFT VENTRICULAR INTERNAL DIMENSION IN DIASTOLE: 5.5 CM (ref 3.5–6)
LEFT VENTRICULAR MASS: 227.4 G
LV LATERAL E/E' RATIO: 8.71
LV SEPTAL E/E' RATIO: 10.17
LYMPHOCYTES # BLD AUTO: 1.7 K/UL (ref 1–4.8)
LYMPHOCYTES NFR BLD: 22.3 % (ref 18–48)
MAGNESIUM SERPL-MCNC: 1.8 MG/DL (ref 1.6–2.6)
MCH RBC QN AUTO: 33.5 PG (ref 27–31)
MCHC RBC AUTO-ENTMCNC: 32 G/DL (ref 32–36)
MCV RBC AUTO: 105 FL (ref 82–98)
MONOCYTES # BLD AUTO: 1.1 K/UL (ref 0.3–1)
MONOCYTES NFR BLD: 14.8 % (ref 4–15)
MV A" WAVE DURATION": 95.15 MS
MV PEAK A VEL: 0.91 M/S
MV PEAK E VEL: 0.61 M/S
NEUTROPHILS # BLD AUTO: 4.4 K/UL (ref 1.8–7.7)
NEUTROPHILS NFR BLD: 59.5 % (ref 38–73)
NONHDLC SERPL-MCNC: 85 MG/DL
NRBC BLD-RTO: 0 /100 WBC
OHS CV RV/LV RATIO: 0.62 CM
OHS QRS DURATION: 100 MS
OHS QTC CALCULATION: 479 MS
PHOSPHATE SERPL-MCNC: 2.9 MG/DL (ref 2.7–4.5)
PISA TR MAX VEL: 1.77 M/S
PLATELET # BLD AUTO: 230 K/UL (ref 150–450)
PMV BLD AUTO: 11 FL (ref 9.2–12.9)
POTASSIUM SERPL-SCNC: 4.4 MMOL/L (ref 3.5–5.1)
PROT SERPL-MCNC: 6.1 G/DL (ref 6–8.4)
PROTHROMBIN TIME: 10.9 SEC (ref 9–12.5)
PROTHROMBIN TIME: 10.9 SEC (ref 9–12.5)
PULM VEIN A" WAVE DURATION": 95.15 MS
PULM VEIN S/D RATIO: 1.93
PULMONIC VEIN PEAK A VELOCITY: 0.2 M/S
PV PEAK D VEL: 0.27 M/S
PV PEAK S VEL: 0.52 M/S
RA MAJOR: 5.24 CM
RA PRESSURE ESTIMATED: 3 MMHG
RA WIDTH: 3.48 CM
RBC # BLD AUTO: 3.16 M/UL (ref 4.6–6.2)
RIGHT VENTRICLE DIASTOLIC BASEL DIMENSION: 3.4 CM
RV TB RVSP: 5 MMHG
SINUS: 4 CM
SODIUM SERPL-SCNC: 139 MMOL/L (ref 136–145)
SPECIMEN OUTDATE: NORMAL
STJ: 2.52 CM
TDI LATERAL: 0.07 M/S
TDI SEPTAL: 0.06 M/S
TDI: 0.07 M/S
TR MAX PG: 13 MMHG
TRICUSPID ANNULAR PLANE SYSTOLIC EXCURSION: 1.89 CM
TRIGL SERPL-MCNC: 61 MG/DL (ref 30–150)
TROPONIN I SERPL DL<=0.01 NG/ML-MCNC: 42 NG/L (ref 0–35)
TROPONIN I SERPL DL<=0.01 NG/ML-MCNC: 43 NG/L (ref 0–35)
TROPONIN I SERPL DL<=0.01 NG/ML-MCNC: 49 NG/L (ref 0–35)
TV PEAK GRADIENT: 13 MMHG
TV REST PULMONARY ARTERY PRESSURE: 16 MMHG
WBC # BLD AUTO: 7.45 K/UL (ref 3.9–12.7)
Z-SCORE OF LEFT VENTRICULAR DIMENSION IN END DIASTOLE: -2.11
Z-SCORE OF LEFT VENTRICULAR DIMENSION IN END SYSTOLE: -0.27

## 2024-12-12 PROCEDURE — 84484 ASSAY OF TROPONIN QUANT: CPT | Mod: 91 | Performed by: INTERNAL MEDICINE

## 2024-12-12 PROCEDURE — 86850 RBC ANTIBODY SCREEN: CPT | Performed by: HOSPITALIST

## 2024-12-12 PROCEDURE — G0378 HOSPITAL OBSERVATION PER HR: HCPCS

## 2024-12-12 PROCEDURE — 85730 THROMBOPLASTIN TIME PARTIAL: CPT | Mod: 91 | Performed by: INTERNAL MEDICINE

## 2024-12-12 PROCEDURE — 85610 PROTHROMBIN TIME: CPT | Performed by: HOSPITALIST

## 2024-12-12 PROCEDURE — 84484 ASSAY OF TROPONIN QUANT: CPT

## 2024-12-12 PROCEDURE — A9567 TECHNETIUM TC-99M AEROSOL: HCPCS | Performed by: INTERNAL MEDICINE

## 2024-12-12 PROCEDURE — A9540 TC99M MAA: HCPCS | Performed by: INTERNAL MEDICINE

## 2024-12-12 PROCEDURE — 25000003 PHARM REV CODE 250: Performed by: HOSPITALIST

## 2024-12-12 PROCEDURE — 83036 HEMOGLOBIN GLYCOSYLATED A1C: CPT | Performed by: HOSPITALIST

## 2024-12-12 PROCEDURE — 99214 OFFICE O/P EST MOD 30 MIN: CPT | Mod: GC,,, | Performed by: INTERNAL MEDICINE

## 2024-12-12 PROCEDURE — 96372 THER/PROPH/DIAG INJ SC/IM: CPT | Performed by: HOSPITALIST

## 2024-12-12 PROCEDURE — 84484 ASSAY OF TROPONIN QUANT: CPT | Mod: 91 | Performed by: HOSPITALIST

## 2024-12-12 PROCEDURE — 85730 THROMBOPLASTIN TIME PARTIAL: CPT | Performed by: HOSPITALIST

## 2024-12-12 PROCEDURE — 99214 OFFICE O/P EST MOD 30 MIN: CPT | Mod: ,,, | Performed by: INTERNAL MEDICINE

## 2024-12-12 PROCEDURE — 83735 ASSAY OF MAGNESIUM: CPT | Performed by: HOSPITALIST

## 2024-12-12 PROCEDURE — 80053 COMPREHEN METABOLIC PANEL: CPT | Performed by: HOSPITALIST

## 2024-12-12 PROCEDURE — 63600175 PHARM REV CODE 636 W HCPCS: Performed by: HOSPITALIST

## 2024-12-12 PROCEDURE — 80061 LIPID PANEL: CPT | Performed by: HOSPITALIST

## 2024-12-12 PROCEDURE — 85025 COMPLETE CBC W/AUTO DIFF WBC: CPT | Performed by: HOSPITALIST

## 2024-12-12 PROCEDURE — 84100 ASSAY OF PHOSPHORUS: CPT | Performed by: HOSPITALIST

## 2024-12-12 RX ORDER — NITROGLYCERIN 0.4 MG/1
0.4 TABLET SUBLINGUAL
Status: DISCONTINUED | OUTPATIENT
Start: 2024-12-12 | End: 2024-12-12

## 2024-12-12 RX ORDER — NAPROXEN SODIUM 220 MG/1
81 TABLET, FILM COATED ORAL DAILY
Status: DISCONTINUED | OUTPATIENT
Start: 2024-12-12 | End: 2024-12-12 | Stop reason: HOSPADM

## 2024-12-12 RX ORDER — TALC
6 POWDER (GRAM) TOPICAL NIGHTLY PRN
Status: DISCONTINUED | OUTPATIENT
Start: 2024-12-12 | End: 2024-12-12 | Stop reason: HOSPADM

## 2024-12-12 RX ORDER — HEPARIN SODIUM,PORCINE/D5W 25000/250
0-40 INTRAVENOUS SOLUTION INTRAVENOUS CONTINUOUS
Status: DISCONTINUED | OUTPATIENT
Start: 2024-12-12 | End: 2024-12-12 | Stop reason: HOSPADM

## 2024-12-12 RX ORDER — NITROGLYCERIN 0.4 MG/1
0.4 TABLET SUBLINGUAL EVERY 5 MIN PRN
Status: DISCONTINUED | OUTPATIENT
Start: 2024-12-12 | End: 2024-12-12 | Stop reason: HOSPADM

## 2024-12-12 RX ORDER — ASPIRIN 81 MG/1
81 TABLET ORAL DAILY
Status: DISCONTINUED | OUTPATIENT
Start: 2024-12-12 | End: 2024-12-12

## 2024-12-12 RX ORDER — ISOSORBIDE MONONITRATE 30 MG/1
30 TABLET, EXTENDED RELEASE ORAL DAILY
Qty: 30 TABLET | Refills: 11 | Status: SHIPPED | OUTPATIENT
Start: 2024-12-12 | End: 2025-12-12

## 2024-12-12 RX ORDER — IBUPROFEN 200 MG
16 TABLET ORAL
Status: DISCONTINUED | OUTPATIENT
Start: 2024-12-12 | End: 2024-12-12 | Stop reason: HOSPADM

## 2024-12-12 RX ORDER — ALUMINUM HYDROXIDE, MAGNESIUM HYDROXIDE, AND SIMETHICONE 1200; 120; 1200 MG/30ML; MG/30ML; MG/30ML
30 SUSPENSION ORAL 4 TIMES DAILY PRN
Status: DISCONTINUED | OUTPATIENT
Start: 2024-12-12 | End: 2024-12-12 | Stop reason: HOSPADM

## 2024-12-12 RX ORDER — AMOXICILLIN 250 MG
2 CAPSULE ORAL 2 TIMES DAILY PRN
Status: DISCONTINUED | OUTPATIENT
Start: 2024-12-12 | End: 2024-12-12 | Stop reason: HOSPADM

## 2024-12-12 RX ORDER — ISOSORBIDE MONONITRATE 30 MG/1
30 TABLET, EXTENDED RELEASE ORAL DAILY
Status: DISCONTINUED | OUTPATIENT
Start: 2024-12-13 | End: 2024-12-12 | Stop reason: HOSPADM

## 2024-12-12 RX ORDER — NITROGLYCERIN 0.4 MG/1
0.4 TABLET SUBLINGUAL EVERY 5 MIN PRN
Qty: 25 TABLET | Refills: 0 | Status: SHIPPED | OUTPATIENT
Start: 2024-12-12

## 2024-12-12 RX ORDER — NALOXONE HCL 0.4 MG/ML
0.02 VIAL (ML) INJECTION
Status: DISCONTINUED | OUTPATIENT
Start: 2024-12-12 | End: 2024-12-12 | Stop reason: HOSPADM

## 2024-12-12 RX ORDER — SODIUM CHLORIDE 0.9 % (FLUSH) 0.9 %
10 SYRINGE (ML) INJECTION EVERY 12 HOURS PRN
Status: DISCONTINUED | OUTPATIENT
Start: 2024-12-12 | End: 2024-12-12 | Stop reason: HOSPADM

## 2024-12-12 RX ORDER — METOPROLOL SUCCINATE 50 MG/1
100 TABLET, EXTENDED RELEASE ORAL DAILY
Status: DISCONTINUED | OUTPATIENT
Start: 2024-12-12 | End: 2024-12-12 | Stop reason: HOSPADM

## 2024-12-12 RX ORDER — LISINOPRIL 10 MG/1
10 TABLET ORAL DAILY
Status: DISCONTINUED | OUTPATIENT
Start: 2024-12-12 | End: 2024-12-12 | Stop reason: HOSPADM

## 2024-12-12 RX ORDER — ONDANSETRON HYDROCHLORIDE 2 MG/ML
4 INJECTION, SOLUTION INTRAVENOUS EVERY 8 HOURS PRN
Status: DISCONTINUED | OUTPATIENT
Start: 2024-12-12 | End: 2024-12-12 | Stop reason: HOSPADM

## 2024-12-12 RX ORDER — ATORVASTATIN CALCIUM 40 MG/1
40 TABLET, FILM COATED ORAL DAILY
Status: DISCONTINUED | OUTPATIENT
Start: 2024-12-12 | End: 2024-12-12

## 2024-12-12 RX ORDER — IBUPROFEN 200 MG
24 TABLET ORAL
Status: DISCONTINUED | OUTPATIENT
Start: 2024-12-12 | End: 2024-12-12 | Stop reason: HOSPADM

## 2024-12-12 RX ORDER — ATORVASTATIN CALCIUM 40 MG/1
80 TABLET, FILM COATED ORAL DAILY
Status: DISCONTINUED | OUTPATIENT
Start: 2024-12-12 | End: 2024-12-12 | Stop reason: HOSPADM

## 2024-12-12 RX ORDER — HEPARIN SODIUM 5000 [USP'U]/ML
5000 INJECTION, SOLUTION INTRAVENOUS; SUBCUTANEOUS EVERY 8 HOURS
Status: DISCONTINUED | OUTPATIENT
Start: 2024-12-12 | End: 2024-12-12

## 2024-12-12 RX ORDER — GLUCAGON 1 MG
1 KIT INJECTION
Status: DISCONTINUED | OUTPATIENT
Start: 2024-12-12 | End: 2024-12-12 | Stop reason: HOSPADM

## 2024-12-12 RX ORDER — FAMOTIDINE 20 MG/1
20 TABLET, FILM COATED ORAL 2 TIMES DAILY
Status: DISCONTINUED | OUTPATIENT
Start: 2024-12-12 | End: 2024-12-12 | Stop reason: HOSPADM

## 2024-12-12 RX ORDER — CHOLECALCIFEROL (VITAMIN D3) 25 MCG
2000 TABLET ORAL DAILY
Status: DISCONTINUED | OUTPATIENT
Start: 2024-12-12 | End: 2024-12-12 | Stop reason: HOSPADM

## 2024-12-12 RX ORDER — POLYETHYLENE GLYCOL 3350 17 G/17G
17 POWDER, FOR SOLUTION ORAL DAILY
Status: DISCONTINUED | OUTPATIENT
Start: 2024-12-12 | End: 2024-12-12 | Stop reason: HOSPADM

## 2024-12-12 RX ADMIN — POLYETHYLENE GLYCOL 3350 17 G: 17 POWDER, FOR SOLUTION ORAL at 08:12

## 2024-12-12 RX ADMIN — CHOLECALCIFEROL TAB 25 MCG (1000 UNIT) 2000 UNITS: 25 TAB at 08:12

## 2024-12-12 RX ADMIN — Medication 6 MG: at 03:12

## 2024-12-12 RX ADMIN — ATORVASTATIN CALCIUM 80 MG: 40 TABLET, FILM COATED ORAL at 08:12

## 2024-12-12 RX ADMIN — LISINOPRIL 10 MG: 10 TABLET ORAL at 08:12

## 2024-12-12 RX ADMIN — HEPARIN SODIUM AND DEXTROSE 12 UNITS/KG/HR: 10000; 5 INJECTION INTRAVENOUS at 10:12

## 2024-12-12 RX ADMIN — HEPARIN SODIUM 5000 UNITS: 5000 INJECTION INTRAVENOUS; SUBCUTANEOUS at 06:12

## 2024-12-12 RX ADMIN — ASPIRIN 81 MG CHEWABLE TABLET 81 MG: 81 TABLET CHEWABLE at 08:12

## 2024-12-12 RX ADMIN — KIT FOR THE PREPARATION OF TECHNETIUM TC 99M PENTETATE 45.5 MILLICURIE: 20 INJECTION, POWDER, LYOPHILIZED, FOR SOLUTION INTRAVENOUS; RESPIRATORY (INHALATION) at 11:12

## 2024-12-12 RX ADMIN — FAMOTIDINE 20 MG: 20 TABLET ORAL at 08:12

## 2024-12-12 RX ADMIN — KIT FOR THE PREPARATION OF TECHNETIUM TC 99M ALBUMIN AGGREGATED 5.5 MILLICURIE: 2.5 INJECTION, POWDER, FOR SOLUTION INTRAVENOUS at 11:12

## 2024-12-12 RX ADMIN — TICAGRELOR 180 MG: 90 TABLET ORAL at 08:12

## 2024-12-12 RX ADMIN — METOPROLOL SUCCINATE 100 MG: 50 TABLET, EXTENDED RELEASE ORAL at 08:12

## 2024-12-12 NOTE — ED PROVIDER NOTES
Encounter Date: 12/11/2024       History     Chief Complaint   Patient presents with    Chest Pain     Crushing Mid sternal radiating down left arm at 2010 and lasted 5-10 mins; hx of bypass;htn; has now subsided; received 2 nitro sprays and 325 asa     Pt is a 89 year old male with PMHx significant for CAD, HTN, HLD who presents for evaluation of chest pain, which began today. He reports a 10/10 mid sternal chest pain radiating to the left shoulder that lasted 5 to 10 minutes. Pain is described as a pressure sensation. He denies any exacerbating or alleviating factors. No history of similar symptoms. Pain resolved spontaneously. EMS was called who administered 325 of aspirin and two nitro sprays. He denies any change in pain level as symptoms had resolved prior to nitro sprays. Patient reports an associated shortness of breath that has also since resolved. No fever, cough, congestion, nausea, vomiting, abdominal pain, numbness, weakness, leg, swelling.    The history is provided by the patient.     Review of patient's allergies indicates:   Allergen Reactions    Iodine and iodide containing products Anaphylaxis    Adhesive     Dye Other (See Comments)    Iodinated contrast media     Trazodone      dizziness    Keflex [cephalexin] Diarrhea and Nausea And Vomiting    Penicillins Rash    Tylenol [acetaminophen] Other (See Comments)     Triggers PTSD/nightmares     Past Medical History:   Diagnosis Date    Allergy     seasonal    Arthritis     Basal cell carcinoma 10 years    right ear     Basal cell carcinoma 08/24/2017    Left earlobe     Basal cell carcinoma 11/14/2020    Left wrist    Basal cell carcinoma 05/18/2020    left nasal tip    CAD (coronary artery disease) 11/29/2012    Hyperlipidemia     Hypertension     Joint pain     PTSD (post-traumatic stress disorder)     SCC (squamous cell carcinoma) 12/12/2022    L dorsal hand-ED&C    Squamous cell carcinoma 06/26/2017    Squamous cell carcinoma of skin 10/2019     right cheek (cryosurgery)      Past Surgical History:   Procedure Laterality Date    CATARACT EXTRACTION      CORONARY ARTERY BYPASS GRAFT       Family History   Problem Relation Name Age of Onset    Skin cancer Mother      Leukemia Father      Diabetes Sister      Peripheral vascular disease Sister      Cancer Brother      No Known Problems Daughter      No Known Problems Daughter      No Known Problems Daughter      No Known Problems Daughter      No Known Problems Son      No Known Problems Son      Melanoma Neg Hx       Social History     Tobacco Use    Smoking status: Former    Smokeless tobacco: Never   Substance Use Topics    Alcohol use: Yes     Comment: occasionally    Drug use: No     Review of Systems    Physical Exam     Initial Vitals [12/11/24 2119]   BP Pulse Resp Temp SpO2   105/61 (!) 112 18 97 °F (36.1 °C) 97 %      MAP       --         Physical Exam    Nursing note and vitals reviewed.  Constitutional: He appears well-developed and well-nourished. No distress.   HENT:   Head: Normocephalic and atraumatic.   Eyes: EOM are normal. Pupils are equal, round, and reactive to light.   Neck: Neck supple.   Normal range of motion.  Cardiovascular:  Normal rate, regular rhythm and intact distal pulses.           No murmur heard.  Pulmonary/Chest: Breath sounds normal. No respiratory distress. He has no wheezes. He has no rales.   Abdominal: Abdomen is soft. He exhibits no distension. There is no abdominal tenderness.   Musculoskeletal:         General: No edema. Normal range of motion.      Cervical back: Normal range of motion and neck supple.     Neurological: He is alert and oriented to person, place, and time. He has normal strength. No cranial nerve deficit or sensory deficit.   Skin: Skin is warm and dry. Capillary refill takes less than 2 seconds.         ED Course   Procedures  Labs Reviewed   CBC W/ AUTO DIFFERENTIAL - Abnormal       Result Value    WBC 7.88      RBC 3.20 (*)     Hemoglobin 10.9  (*)     Hematocrit 32.9 (*)      (*)     MCH 34.1 (*)     MCHC 33.1      RDW 12.0      Platelets 226      MPV 10.8      Immature Granulocytes 0.5      Gran # (ANC) 5.7      Immature Grans (Abs) 0.04      Lymph # 1.1      Mono # 0.9      Eos # 0.1      Baso # 0.05      nRBC 0      Gran % 72.5      Lymph % 13.6 (*)     Mono % 11.0      Eosinophil % 1.8      Basophil % 0.6      Differential Method Automated      Narrative:     Release to patient->Immediate   COMPREHENSIVE METABOLIC PANEL - Abnormal    Sodium 139      Potassium 4.1      Chloride 111 (*)     CO2 20 (*)     Glucose 119 (*)     BUN 27      Creatinine 1.0      Calcium 9.3      Total Protein 6.2      Albumin 3.3 (*)     Total Bilirubin 0.3      Alkaline Phosphatase 81      AST 26      ALT 16      eGFR >60.0      Anion Gap 8      Narrative:     Release to patient->Immediate   TROPONIN I HIGH SENSITIVITY - Abnormal    Troponin I High Sensitivity 49 (*)    D DIMER, QUANTITATIVE - Abnormal    D-Dimer 4.38 (*)    CBC W/ AUTO DIFFERENTIAL - Abnormal    WBC 7.45      RBC 3.16 (*)     Hemoglobin 10.6 (*)     Hematocrit 33.1 (*)      (*)     MCH 33.5 (*)     MCHC 32.0      RDW 12.1      Platelets 230      MPV 11.0      Immature Granulocytes 0.4      Gran # (ANC) 4.4      Immature Grans (Abs) 0.03      Lymph # 1.7      Mono # 1.1 (*)     Eos # 0.2      Baso # 0.05      nRBC 0      Gran % 59.5      Lymph % 22.3      Mono % 14.8      Eosinophil % 2.3      Basophil % 0.7      Differential Method Automated     COMPREHENSIVE METABOLIC PANEL - Abnormal    Sodium 139      Potassium 4.4      Chloride 111 (*)     CO2 19 (*)     Glucose 103      BUN 28      Creatinine 0.9      Calcium 9.2      Total Protein 6.1      Albumin 3.2 (*)     Total Bilirubin 0.4      Alkaline Phosphatase 81      AST 26      ALT 15      eGFR >60.0      Anion Gap 9     TROPONIN I HIGH SENSITIVITY - Abnormal    Troponin I High Sensitivity 49 (*)    TROPONIN I HIGH SENSITIVITY - Abnormal     Troponin I High Sensitivity 49 (*)     Narrative:     Draw baseline aPTT prior to starting the heparin bolus or  infusion  (if patient is on warfarin prior to heparin therapy)   TROPONIN I HIGH SENSITIVITY - Abnormal    Troponin I High Sensitivity 49 (*)     Narrative:     Draw baseline aPTT prior to starting the heparin bolus or  infusion  (if patient is on warfarin prior to heparin therapy)   TROPONIN I HIGH SENSITIVITY - Abnormal    Troponin I High Sensitivity 42 (*)    TROPONIN I HIGH SENSITIVITY - Abnormal    Troponin I High Sensitivity 43 (*)    APTT - Abnormal    aPTT 37.4 (*)    HEPATITIS C ANTIBODY    Hepatitis C Ab Non-reactive      Narrative:     Release to patient->Immediate   HIV 1 / 2 ANTIBODY    HIV 1/2 Ag/Ab Non-reactive      Narrative:     Release to patient->Immediate   TROPONIN I HIGH SENSITIVITY    Troponin I High Sensitivity 21      Narrative:     Release to patient->Immediate   B-TYPE NATRIURETIC PEPTIDE    BNP 91      Narrative:     Release to patient->Immediate   MAGNESIUM    Magnesium 1.8     PHOSPHORUS    Phosphorus 2.9     PROTIME-INR    Prothrombin Time 10.9      INR 1.0      Narrative:     Draw baseline aPTT prior to starting the heparin bolus or  infusion  (if patient is on warfarin prior to heparin therapy)   APTT    aPTT 26.3      Narrative:     Draw baseline aPTT prior to starting the heparin bolus or  infusion  (if patient is on warfarin prior to heparin therapy)   LIPID PANEL    Cholesterol 125      Triglycerides 61      HDL 40      LDL Cholesterol 72.8      HDL/Cholesterol Ratio 32.0      Total Cholesterol/HDL Ratio 3.1      Non-HDL Cholesterol 85      Narrative:     Draw baseline aPTT prior to starting the heparin bolus or  infusion  (if patient is on warfarin prior to heparin therapy)   HEMOGLOBIN A1C    Hemoglobin A1C 5.6      Estimated Avg Glucose 114     APTT    aPTT 26.3      Narrative:     Draw baseline aPTT prior to starting the heparin bolus or  infusion  (if patient is  on warfarin prior to heparin therapy)   PROTIME-INR    Prothrombin Time 10.9      INR 1.0      Narrative:     Draw baseline aPTT prior to starting the heparin bolus or  infusion  (if patient is on warfarin prior to heparin therapy)   TYPE & SCREEN    Group & Rh O NEG      Indirect Raoul NEG      Specimen Outdate 12/15/2024 23:59          ECG Results              EKG 12-lead (Final result)        Collection Time Result Time QRS Duration OHS QTC Calculation    12/11/24 21:26:17 12/12/24 09:09:55 100 479                     Final result by Interface, Lab In [facility] (12/12/24 09:10:03)                   Narrative:    Test Reason : R07.9,    Vent. Rate :  90 BPM     Atrial Rate :  90 BPM     P-R Int : 180 ms          QRS Dur : 100 ms      QT Int : 392 ms       P-R-T Axes :  50  14  61 degrees    QTcB Int : 479 ms    Sinus rhythm with occasional Premature ventricular complexes  Otherwise normal ECG  When compared with ECG of 09-Jan-2024 13:01,  Premature ventricular complexes are now Present  Vent. rate has increased by  38 bpm  QT has lengthened  Confirmed by Carlos Manuel Sandoval ([id number]) on 12/12/2024 9:09:53 AM    Referred By: AAAREFERRAL SELF           Confirmed By: Carlos Manuel Sandoval                                  Imaging Results              NM Lung Scan Ventilation Perfusion (Final result)  Result time 12/12/24 12:12:05      Final result by Hunter Lacy IV, MD (12/12/24 12:12:05)                   Impression:      Overall findings represent low probability of pulmonary embolism.      Electronically signed by: Hunter Lacy  Date:    12/12/2024  Time:    12:12               Narrative:    EXAMINATION:  NM LUNG VENTILATION AND PERFUSION IMAGING    CLINICAL HISTORY:  Pulmonary embolism (PE) suspected, positive D-dimer;    TECHNIQUE:  45.0 mCi of Tc-99m-DTPA were placed in the nebulizer. Following the inhalation Tc-99m-DTPA in aerosol and the subsequent IV administration of 5.0 mCi of Tc-99m-MAA, multiple images of  the thorax were obtained in various projections.    COMPARISON:  Chest radiograph 12/11/2024.    FINDINGS:  Perfusion: Adequate distribution of tracer throughout both lungs.  No focal defect.    Ventilation: Adequate distribution of tracer throughout both lungs.  No focal defect.                                       US Lower Extremity Veins Bilateral (Final result)  Result time 12/12/24 06:09:34      Final result by Cabrera Griffin MD (12/12/24 06:09:34)                   Impression:      No imaging evidence of deep venous thrombosis in either lower extremity.    Incidental 3.6 x 1.2 x 2.7 cm left popliteal fossa Baker's cyst.    Electronically signed by resident: Lily Poe  Date:    12/12/2024  Time:    03:24    Electronically signed by: Cabrera Griffin  Date:    12/12/2024  Time:    06:09               Narrative:    EXAMINATION:  US LOWER EXTREMITY VEINS BILATERAL    CLINICAL HISTORY:  Chest pain, unspecified    TECHNIQUE:  Duplex and color flow Doppler and dynamic compression was performed of the bilateral lower extremity veins was performed.    COMPARISON:  None    FINDINGS:  Right thigh veins: The common femoral, femoral, popliteal, upper greater saphenous, and deep femoral veins are patent and free of thrombus. The veins are normally compressible and have normal phasic flow and augmentation response.    Right calf veins: The visualized calf veins are patent.    Left thigh veins: The common femoral, femoral, popliteal, upper greater saphenous, and deep femoral veins are patent and free of thrombus. The veins are normally compressible and have normal phasic flow and augmentation response.    Left calf veins: The visualized calf veins are patent.    Miscellaneous: 3.6 x 1.2 x 2.7 cm anechoic cystic lesion in the left popliteal fossa representing a Baker's cyst.                                       X-Ray Chest AP Portable (Final result)  Result time 12/12/24 00:56:31      Final result by Catalino Allison,   (12/12/24 00:56:31)                   Impression:      No acute abnormality.      Electronically signed by: Catalino Allison  Date:    12/12/2024  Time:    00:56               Narrative:    EXAMINATION:  XR CHEST AP PORTABLE    CLINICAL HISTORY:  Chest Pain;    TECHNIQUE:  Single frontal view of the chest was performed.    COMPARISON:  01/09/2024.    FINDINGS:  The lungs are well expanded and clear. No focal opacities are seen. The pleural spaces are clear. The cardiac silhouette is unremarkable. The visualized osseous structures demonstrate degenerative changes.  There are calcifications of the aortic arch.  There are median sternotomy wires.  There are surgical clips.                                       Medications   famotidine (PF) injection 20 mg (20 mg Intravenous Given 12/11/24 2303)   ticagrelor tablet 180 mg (180 mg Oral Given 12/12/24 0829)   kit for prep of Tc-99m-albumin 2.5 mg SolR 5.5 millicurie (5.5 millicuries Intravenous Given 12/12/24 1110)   kit prep Tc 99m-pentetic acid (aerosol) 20 mg SolR 45 millicurie (45.5 millicuries Nebulization Given 12/12/24 1110)     Medical Decision Making  Patient presents for chest pain. Differential diagnosis includes, but is not limited to: ACS, arrhythmia, electrolyte, abnormality, pneumonia, pneumothorax, PE. Patient well appearing and in no acute distress. Afebrile and hemodynamically stable although with tachycardia noted. EKG without ST elevation. Labs without leukocytosis, clinically significant anemia, electrolyte abnormality. Chest x-ray without acute process. Patient signed out to incoming ED team pending lab, including troponin with plan for admission for further cardiac work up    Amount and/or Complexity of Data Reviewed  Labs: ordered.  Radiology: ordered.    Risk  Prescription drug management.              Attending Attestation:   Physician Attestation Statement for Resident:  As the supervising MD   Physician Attestation Statement: I have personally  seen and examined this patient.   I agree with the above history.  -: Labs resulted after time of signed out to incoming team.  Anticipate observation for further cardiac risk stratification.   As the supervising MD I agree with the above PE.     As the supervising MD I agree with the above treatment, course, plan, and disposition.                                           Clinical Impression:  Final diagnoses:  [R07.9] Chest pain          ED Disposition Condition    Observation                 Niles Colin Jr., MD  Resident  12/12/24 0107       Jus Munoz MD  12/12/24 3213

## 2024-12-12 NOTE — CONSULTS
"[facility]  Cardiology  Consult Note    Patient Name: Aleks Brown  MRN: [id number]  Admission Date: 12/11/2024  Hospital Length of Stay: 0 days  Code Status: Full Code   Attending Provider: Justen Garcia MD   Consulting Provider: Nathan Romero MD  Primary Care Physician: Deshaun Goel MD  Principal Problem:NSTEMI (non-ST elevated myocardial infarction)    Patient information was obtained from patient and ER records.     Inpatient consult to Cardiology  Consult performed by: Nathan Romero MD  Consult ordered by: Teofilo Weiss DO  Reason for consult: chest pain        Subjective:     Chief Complaint:  Chest pain      HPI:   89 yo M pmh of bilateral carotid artery stenosis, CAD s/p CABG (1988), HTN. Presented to the ED for eval of chest pain.    reports the night of 12/11 he experienced some indigestion symptoms and sensation of feeling "off" that was relieved after taking over the counter anti-acid medications. He then he went to bed after eating a heavy meal. He woke up at around 8:30 p.m. with chest pain from the left and right shoulder across the chest with paresthesia in both hands that resolved on its own after 20 minutes. His blood pressure was within normal limits when EMS had arrived at his house and he received nitro on route. He has not had chest pain in the hospital since then. no light-headedness no nausea. Denies feeling increased dyspnea in the weeks prior to this episode but has significant neuropathy of the feet from frostbite during the [place] war which severely limits his mobility.     At time of consult  afebrile, 171/68, 75 bpm, 95% RA. Hb 10.6 (BL 11-12), cr 0.9, 1st trop 49, 2nd trop 49. Was started on low intesity heparin drip in ED, load with brillinta.     Past Medical History:   Diagnosis Date    Allergy     seasonal    Arthritis     Basal cell carcinoma 10 years    right ear     Basal cell carcinoma 08/24/2017    Left earlobe     Basal cell carcinoma 11/14/2020 "    Left wrist    Basal cell carcinoma 05/18/2020    left nasal tip    CAD (coronary artery disease) 11/29/2012    Hyperlipidemia     Hypertension     Joint pain     PTSD (post-traumatic stress disorder)     SCC (squamous cell carcinoma) 12/12/2022    L dorsal hand-ED&C    Squamous cell carcinoma 06/26/2017    Squamous cell carcinoma of skin 10/2019    right cheek (cryosurgery)        Past Surgical History:   Procedure Laterality Date    CATARACT EXTRACTION      CORONARY ARTERY BYPASS GRAFT         Review of patient's allergies indicates:   Allergen Reactions    Iodine and iodide containing products Anaphylaxis    Adhesive     Dye Other (See Comments)    Iodinated contrast media     Trazodone      dizziness    Keflex [cephalexin] Diarrhea and Nausea And Vomiting    Penicillins Rash    Tylenol [acetaminophen] Other (See Comments)     Triggers PTSD/nightmares       No current facility-administered medications on file prior to encounter.     Current Outpatient Medications on File Prior to Encounter   Medication Sig    aspirin (ECOTRIN) 81 MG EC tablet Take 81 mg by mouth once daily.     atorvastatin (LIPITOR) 40 MG tablet Take 1 tablet (40 mg total) by mouth once daily.    cholecalciferol, vitamin D3, (VITAMIN D3) 50 mcg (2,000 unit) Tab Take 1 tablet by mouth once daily.    clobetasoL (TEMOVATE) 0.05 % external solution Pt to mix in 1 jar of cerave cream and apply to affected areas bid    clotrimazole-betamethasone 1-0.05% (LOTRISONE) cream Apply topically 2 (two) times daily.    lisinopriL 10 MG tablet Take 1 tablet (10 mg total) by mouth once daily.    metoprolol succinate (TOPROL-XL) 100 MG 24 hr tablet Take 1 tablet (100 mg total) by mouth once daily.    MULTIVITAMIN ORAL Take 1 tablet by mouth once daily.    nitroGLYCERIN (NITROSTAT) 0.4 MG SL tablet Place 1 tablet under the tongue as needed.     Family History       Problem Relation (Age of Onset)    Cancer Brother    Diabetes Sister    Leukemia Father    No  Known Problems Daughter, Daughter, Daughter, Daughter, Son, Son    Peripheral vascular disease Sister    Skin cancer Mother          Tobacco Use    Smoking status: Former    Smokeless tobacco: Never   Substance and Sexual Activity    Alcohol use: Yes     Comment: occasionally    Drug use: No    Sexual activity: Not Currently     Partners: Female     ROS  Objective:     Vital Signs (Most Recent):  Temp: 97.5 °F (36.4 °C) (12/12/24 0756)  Pulse: 77 (12/12/24 0803)  Resp: 20 (12/12/24 0803)  BP: (!) 171/68 (12/12/24 0803)  SpO2: 96 % (12/12/24 0803) Vital Signs (24h Range):  Temp:  [97 °F (36.1 °C)-97.5 °F (36.4 °C)] 97.5 °F (36.4 °C)  Pulse:  [] 77  Resp:  [18-25] 20  SpO2:  [94 %-97 %] 96 %  BP: (105-186)/() 171/68     Weight: 91.7 kg (202 lb 2.6 oz)  Body mass index is 27.42 kg/m².    SpO2: 96 %       No intake or output data in the 24 hours ending 12/12/24 0942    Lines/Drains/Airways       Peripheral Intravenous Line  Duration                  Peripheral IV - Single Lumen 12/11/24 2303 18 G Right Antecubital <1 day                     Physical Exam  Constitutional:       General: He is not in acute distress.     Appearance: He is not ill-appearing.   Eyes:      General:         Right eye: No discharge.         Left eye: No discharge.   Cardiovascular:      Rate and Rhythm: Normal rate and regular rhythm.      Pulses: Normal pulses.      Heart sounds: Normal heart sounds.   Pulmonary:      Effort: Pulmonary effort is normal.      Breath sounds: Normal breath sounds.   Abdominal:      General: Abdomen is flat.      Palpations: Abdomen is soft.   Musculoskeletal:      Right lower leg: No edema.      Left lower leg: No edema.   Neurological:      Mental Status: He is alert. Mental status is at baseline.          Significant Labs:   Recent Lab Results  (Last 5 results in the past 24 hours)        12/12/24  0809   12/12/24  0629   12/12/24  0349   12/12/24  0201   12/11/24  2219        Albumin     3.2            ALP     81           ALT     15           Anion Gap     9           PTT 26.3  Comment: Refer to local heparin nomogram for intensity/dose specific   therapeutic   range.                  26.3  Comment: Refer to local heparin nomogram for intensity/dose specific   therapeutic   range.                 AST     26           Baso #     0.05           Basophil %     0.7           BILIRUBIN TOTAL     0.4  Comment: For infants and newborns, interpretation of results should be based  on gestational age, weight and in agreement with clinical  observations.    Premature Infant recommended reference ranges:  Up to 24 hours.............<8.0 mg/dL  Up to 48 hours............<12.0 mg/dL  3-5 days..................<15.0 mg/dL  6-29 days.................<15.0 mg/dL             BUN     28           Calcium     9.2           Chloride     111           Cholesterol Total 125  Comment: The National Cholesterol Education Program (NCEP) has set the  following guidelines (reference ranges) for Cholesterol:  Optimal.....................<200 mg/dL  Borderline High.............200-239 mg/dL  High........................> or = 240 mg/dL                 CO2     19           Creatinine     0.9           D-Dimer         4.38  Comment: The quantitative D-dimer assay should be used as an aid in   the diagnosis of deep vein thrombosis and pulmonary embolism  in patients with the appropriate presentation and clinical  history. The upper limit of the reference interval and the clinical   cut off   point are identical. Causes of a positive (>0.50 mg/L FEU) D-Dimer   test  include, but are not limited to: DVT, PE, DIC, thrombolytic   therapy, anticoagulant therapy, recent surgery, trauma, or   pregnancy, disseminated malignancy, aortic aneurysm, cirrhosis,  and severe infection. False negative results may occur in   patients with distal DVT.         Differential Method     Automated           eGFR     >60.0           Eos #     0.2           Eos %      2.3           Estimated Avg Glucose 114               Glucose     103           Gran # (ANC)     4.4           Gran %     59.5           HDL 40  Comment: The National Cholesterol Education Program (NCEP) has set the  following guidelines (reference values) for HDL Cholesterol:  Low...............<40 mg/dL  Optimal...........>60 mg/dL                 HDL/Cholesterol Ratio 32.0               Hematocrit     33.1           Hemoglobin     10.6           Hemoglobin A1C External 5.6  Comment: ADA Screening Guidelines:  5.7-6.4%  Consistent with prediabetes  >or=6.5%  Consistent with diabetes    High levels of fetal hemoglobin interfere with the HbA1C  assay. Heterozygous hemoglobin variants (HbS, HgC, etc)do  not significantly interfere with this assay.   However, presence of multiple variants may affect accuracy.                 Immature Grans (Abs)     0.03  Comment: Mild elevation in immature granulocytes is non specific and   can be seen in a variety of conditions including stress response,   acute inflammation, trauma and pregnancy. Correlation with other   laboratory and clinical findings is essential.             Immature Granulocytes     0.4           INR 1.0  Comment: Coumadin Therapy:  2.0 - 3.0 for INR for all indicators except mechanical heart valves  and antiphospholipid syndromes which should use 2.5 - 3.5.                  1.0  Comment: Coumadin Therapy:  2.0 - 3.0 for INR for all indicators except mechanical heart valves  and antiphospholipid syndromes which should use 2.5 - 3.5.                 LDL Cholesterol 72.8  Comment: The National Cholesterol Education Program (NCEP) has set the  following guidelines (reference values) for LDL Cholesterol:  Optimal.......................<130 mg/dL  Borderline High...............130-159 mg/dL  High..........................160-189 mg/dL  Very High.....................>190 mg/dL                 Lymph #     1.7           Lymph %     22.3           Magnesium      1.8            MCH     33.5           MCHC     32.0           MCV     105           Mono #     1.1           Mono %     14.8           MPV     11.0           Non-HDL Cholesterol 85  Comment: Risk category and Non-HDL cholesterol goals:  Coronary heart disease (CHD)or equivalent (10-year risk of CHD >20%):  Non-HDL cholesterol goal     <130 mg/dL  Two or more CHD risk factors and 10-year risk of CHD <= 20%:  Non-HDL cholesterol goal     <160 mg/dL  0 to 1 CHD risk factor:  Non-HDL cholesterol goal     <190 mg/dL                 nRBC     0           Phosphorus Level     2.9           Platelet Count     230           Potassium     4.4           PROTEIN TOTAL     6.1           PT 10.9                10.9               RBC     3.16           RDW     12.1           Sodium     139           Total Cholesterol/HDL Ratio 3.1               Triglycerides 61  Comment: The National Cholesterol Education Program (NCEP) has set the  following guidelines (reference values) for triglycerides:  Normal......................<150 mg/dL  Borderline High.............150-199 mg/dL  High........................200-499 mg/dL                 Troponin I High Sensitivity 49   49     49          49               WBC     7.45                                  Assessment and Plan:     Coronary artery disease involving coronary bypass graft with unstable angina pectoris  Patient with known CAD s/p CABG 2011. 3 troponins elevated but stable at 49. Risk and benefits discussed with him and his family regarding stress test, angiogram, stent given his cardiac history and age. LAZARUS 6 points,MICHAEL score 132. Concern for epicardial stenosis given history.     - Interventional cardiology consult to assess for angiogram and stent placement        VTE Risk Mitigation (From admission, onward)           Ordered     heparin 25,000 units in dextrose 5% (100 units/ml) IV bolus from bag LOW INTENSITY nomogram - [facility]  As needed (PRN)        Question:  Heparin Infusion  Adjustment (DO NOT MODIFY ANSWER)  Answer:  [email or web address]\epic\Images\Pharmacy\HeparinInfusions\heparin LOW INTENSITY nomogram for [facility] [id number]    12/12/24 0629     heparin 25,000 units in dextrose 5% (100 units/ml) IV bolus from bag LOW INTENSITY nomogram - [facility]  As needed (PRN)        Question:  Heparin Infusion Adjustment (DO NOT MODIFY ANSWER)  Answer:  [email or web address]\epic\Images\Pharmacy\HeparinInfusions\heparin LOW INTENSITY nomogram for [facility] [id number]    12/12/24 0629     heparin 25,000 units in dextrose 5% 250 mL (100 units/mL) infusion LOW INTENSITY nomogram - [facility]  Continuous        Question:  Begin at (units/kg/hr)  Answer:  12    12/12/24 0629     IP VTE HIGH RISK PATIENT  Once         12/12/24 0159     Place sequential compression device  Until discontinued         12/12/24 0159                    Thank you for your consult. I will follow-up with patient. Please contact us if you have any additional questions.    Nathan Romero MD  Cardiology   [facility] - [facility]

## 2024-12-12 NOTE — DISCHARGE SUMMARY
[facility]  [facility] Medicine  Discharge Summary      Patient Name: Aleks Brown  MRN: [id number]  JANAY: [id number]  Patient Class: OP- Observation  Admission Date: 12/11/2024  Hospital Length of Stay: 0 days  Discharge Date and Time:  12/12/2024 4:12 PM  Attending Physician: Justen Garcia MD   Discharging Provider: Justen Garcia MD  Primary Care Provider: Deshaun Goel MD  [facility] Medicine Team: [facility]  Justen Garcia MD  Primary Care Team: [facility]    HPI:   Aleks Brown is a 89 y.o. male with PMH HTN, hyperlipidemia, CAD s/p CABG 1989, peripheral neuropathy of lower extremities and unsteady gait who presented to ED with complaints of chest pain. Patient reports around 8 pm last evening he woke up from sleep with sharp pain across upper chest. He report pain as severe 10 out of 10, localized across upper chest and non radiating. He felt tingling in both of his hands during chest pain. Chest pain lasted about 15 minutes and resolved spontaneously before EMS arrival.  He got ASA and Nitro with EMS. He denies palpitation, dizziness, syncope, N/V/diaphoresis, abdominal pain, dysuria, hematuria, focal weakness/numbness, dark stool or bleeding from other sites. Denies tobacco or alcohol use.      ED course: afebrile and vitals stable. Labs mostly unremarkable except mild elevated D-dimer 4.4. BNP and initial Troponin negative. EKG NSR @ 90 bpm with occasional PVCs. Patient received pepcid 20 mg IV x 1 in ED for heart burn. During my interview patient is awake, conversant and not in distress. He remained chest pain free and hemodynamically stable. Repeat troponin elevated to 49.      * No surgery found *      Hospital Course:   Patient admitted for bout of chest pain. Troponins minimally elevated and stable. EKG without ischemic changes. Cardiology and interventional cardiology consulted due to significant CAD history. Cardiology recommending outpatient follow up.  Interventional cardiology recommending no intervention and outpatient stress test. Also started on Imdur during this hospitalization for better blood pressure control. Patient without chest pain during admission. Patient seen and evaluated on day of discharge. Pt deemed appropriate for discharge. Plan discussed with pt, who was agreeable and amenable; medications were discussed and reviewed, outpatient follow-up scheduled, ER precautions were given, all questions were answered to the pt's satisfaction, and patient was subsequently discharged.     Goals of Care Treatment Preferences:  Code Status: Full Code         Consults:   Consults (From admission, onward)          Status Ordering Provider     Inpatient consult to Interventional Cardiology  Once        Provider:  (Not yet assigned)    Completed SARA VARNER     Inpatient consult to Registered Dietitian/Nutritionist  Once        Provider:  (Not yet assigned)    Acknowledged OTILIA VERDIN     Inpatient consult to Social Work/Case Management  Once        Provider:  (Not yet assigned)    Acknowledged OTILIA VERDIN     Inpatient consult to Cardiology  Once        Provider:  (Not yet assigned)    Completed OTILIA VERDIN     Inpatient consult to Cardiac Rehab  Once        Provider:  (Not yet assigned)    Completed OTILIA VERDIN            * NSTEMI (non-ST elevated myocardial infarction)  -patient presented with an episode of unstable angina which resolved prior to EMS arrival   -received aspirin and NTG SL by EMS   -EKG NSR with occasional PVCs   -initial high sensitivity troponin negative and mild elevated D-dimer   -remained chest pain free and hemodynamically stable in ED  -repeat troponin elevated to 49  -given h/o CAD s/p CABG, risk factors and presentation of unstable angina will start on NSTEMI pathway (DAPT + heparin infusion)   -telemetry monitoring, trend troponin, check echo in am   -follow up with cardiology recommendations         Elevated  d-dimer  -mild elevated d-dimer 4.4 in ED and V/Q scan ordered as patient unable to get CTA due to iodinated contrast allergy   -follow up V/Q scan result (test pending to be done in am)  -lower extremity doppler negative for DVT       GERD (gastroesophageal reflux disease)  -received pepcid in ED and will start on BID      Benign essential HTN  Patient's blood pressure range in the last 24 hours was: BP  Min: 105/61  Max: 186/107.The patient's inpatient anti-hypertensive regimen is listed below:  Current Antihypertensives  lisinopriL tablet 10 mg, Daily, Oral  metoprolol succinate (TOPROL-XL) 24 hr tablet 100 mg, Daily, Oral  nitroGLYCERIN SL tablet 0.4 mg, Every 5 min PRN, Sublingual    Plan  - BP is controlled, no changes needed to their regimen  - goal BP < 130/80    Coronary artery disease involving coronary bypass graft with unstable angina pectoris  Patient with known CAD s/p CABG, which is uncontrolled Will continue  heparin infusion, brilinta, ASA, and Statin, beat blocker and monitor for S/Sx of angina/ACS. Continue to monitor on telemetry.     Dyslipidemia  -on high intensity statin for NSTEMI   -check lipid panel         Final Active Diagnoses:    Diagnosis Date Noted POA    PRINCIPAL PROBLEM:  NSTEMI (non-ST elevated myocardial infarction) [I21.4] 12/12/2024 Yes    Coronary artery disease involving coronary bypass graft with unstable angina pectoris [I25.700] 12/12/2024 Yes    Benign essential HTN [I10] 12/12/2024 Unknown    GERD (gastroesophageal reflux disease) [K21.9] 12/12/2024 Yes    Elevated d-dimer [R79.89] 12/12/2024 Yes    CAD (coronary artery disease) [I25.10] 12/12/2024 Unknown    Essential hypertension [I10] 10/29/2015 Yes    Dyslipidemia [E78.5] 11/06/2012 Yes      Problems Resolved During this Admission:       Discharged Condition: good    Disposition: Home or Self Care    Follow Up:    Patient Instructions:      Ambulatory referral/consult to Cardiology   Standing Status: Future    Referral Priority: Routine Referral Type: Consultation   Referral Reason: Specialty Services Required   Requested Specialty: Cardiology   Number of Visits Requested: 1       Significant Diagnostic Studies: N/A    Pending Diagnostic Studies:       Procedure Component Value Units Date/Time    APTT [[id number]] Collected: 12/12/24 1538    Order Status: Sent Lab Status: No result     Specimen: Blood     Troponin I High Sensitivity [[id number]] Collected: 12/12/24 1534    Order Status: Sent Lab Status: In process Updated: 12/12/24 1538    Specimen: Blood     Troponin I High Sensitivity [[id number]] Collected: 12/12/24 1534    Order Status: Sent Lab Status: In process Updated: 12/12/24 1538    Specimen: Blood            Medications:  Reconciled Home Medications:      Medication List        START taking these medications      isosorbide mononitrate 30 MG 24 hr tablet  Commonly known as: IMDUR  Take 1 tablet (30 mg total) by mouth once daily.            CHANGE how you take these medications      nitroGLYCERIN 0.4 MG SL tablet  Commonly known as: NITROSTAT  Place 1 tablet (0.4 mg total) under the tongue every 5 (five) minutes as needed for Chest pain.  What changed:   when to take this  reasons to take this            CONTINUE taking these medications      aspirin 81 MG EC tablet  Commonly known as: ECOTRIN  Take 81 mg by mouth once daily.     atorvastatin 40 MG tablet  Commonly known as: LIPITOR  Take 1 tablet (40 mg total) by mouth once daily.     cholecalciferol (vitamin D3) 50 mcg (2,000 unit) Tab  Commonly known as: VITAMIN D3  Take 1 tablet by mouth once daily.     clobetasoL 0.05 % external solution  Commonly known as: TEMOVATE  Pt to mix in 1 jar of cerave cream and apply to affected areas bid     clotrimazole-betamethasone 1-0.05% cream  Commonly known as: LOTRISONE  Apply topically 2 (two) times daily.     lisinopriL 10 MG tablet  Take 1 tablet (10 mg total) by mouth once daily.     metoprolol succinate 100 MG 24  hr tablet  Commonly known as: TOPROL-XL  Take 1 tablet (100 mg total) by mouth once daily.     MULTIVITAMIN ORAL  Take 1 tablet by mouth once daily.              Indwelling Lines/Drains at time of discharge:   Lines/Drains/Airways       None                   Time spent on the discharge of patient: 35 minutes         Justen Garcia MD  Department of Hospital Medicine  [facility]

## 2024-12-12 NOTE — ASSESSMENT & PLAN NOTE
89-year-old male with history of remote 5 vessel bypass here with chest pain.  He has had no further chest pain since initial episode.  His troponin level is unimpressive and flat.  Echocardiogram appears to have a preserved EF and no wall motion abnormalities.  EKG is without acute ischemic changes.    Recommendations  Given the above findings and mildly uncontrolled hypertension, presentation does not appear to be consistent with type 1 NSTEMI  Would optimize medical therapy with Imdur  Continue high-intensity statin, aspirin, and adequate blood pressure control.  He appears to be maxed out on his beta-blockade  Follow up with his outpatient cardiologist on discharge

## 2024-12-12 NOTE — ASSESSMENT & PLAN NOTE
Patient with known CAD s/p CABG 2011. 3 troponins elevated but stable at 49. Risk and benefits discussed with him and his family regarding stress test, angiogram, stent given his cardiac history and age. LAZARUS 6 points,MICHAEL score 132. Concern for epicardial stenosis given history.     - Interventional cardiology consult to assess for angiogram and stent placement

## 2024-12-12 NOTE — DISCHARGE INSTRUCTIONS
Cardiology and Interventional cardiology saw you during your admission. They recommended to start taking Imdur 30mg daily for your blood pressure as well as follow up with you outpatient for a stress test. Referrals to cardiology have been ordered. I also refilled your nitrostat to use if you have any recurrence of chest pain.

## 2024-12-12 NOTE — ASSESSMENT & PLAN NOTE
Patient with known CAD s/p CABG, which is uncontrolled Will continue  heparin infusion, brilinta, ASA, and Statin, beat blocker and monitor for S/Sx of angina/ACS. Continue to monitor on telemetry.

## 2024-12-12 NOTE — PROVIDER PROGRESS NOTES - EMERGENCY DEPT.
Encounter Date: 12/11/2024    ED Physician Progress Notes          Physician Note:   Signout Note  I received signout from the previous providers, Dr Colin and Dr. Munoz.      Chief complaint: Chest Pain      Per sign out and chart review:  Aleks Brown is a 89 y.o. male, PMH CABG, HTN, presenting with complaints of L sided chest pain. Pt reports episode lasted around 10 mins and was resolved prior to EMS arrival. He got ASA and Nitro with EMS.      During ED stay patient received:  Medications   famotidine (PF) injection 20 mg (20 mg Intravenous Given 12/11/24 9928)     Pt signed out to me pending: Labs and admission for high risk heart score. He was found to have elevated Dimer but has hx of anaphylaxis to iodine, plan for admit and V/Q scan     HEART Score for Major Cardiac Events - MDCalc  Calculated on Dec 11 2024 11:24 PM  7 points -> High Score (7-10 points) Risk of MACE of 50-65%.       Update/ Disposition: Pt admitted to  for V/Q scan and DVT US of the bilateral LE.     Patient, caregiver and/or family understands the plan and verbalized agreement. All questions answered.      Diagnostic Impression:    Chest Pain     Agustina Kramer MD  [facility], PGY2

## 2024-12-12 NOTE — H&P
[facility] - Emergency Dept  [facility] Medicine  History & Physical    Patient Name: Aleks Brown  MRN: [id number]  Patient Class: OP- Observation  Admission Date: 12/11/2024  Attending Physician: Justen Garcia MD   Primary Care Provider: Deshaun Goel MD         Patient information was obtained from patient, ER records, and daughter at bedside .     Subjective:     Principal Problem:NSTEMI (non-ST elevated myocardial infarction)    Chief Complaint:   Chief Complaint   Patient presents with    Chest Pain     Crushing Mid sternal radiating down left arm at 2010 and lasted 5-10 mins; hx of bypass;htn; has now subsided; received 2 nitro sprays and 325 asa        HPI: Aleks Brown is a 89 y.o. male with PMH HTN, hyperlipidemia, CAD s/p CABG 1989, peripheral neuropathy of lower extremities and unsteady gait who presented to ED with complaints of chest pain. Patient reports around 8 pm last evening he woke up from sleep with sharp pain across upper chest. He report pain as severe 10 out of 10, localized across upper chest and non radiating. He felt tingling in both of his hands during chest pain. Chest pain lasted about 15 minutes and resolved spontaneously before EMS arrival.  He got ASA and Nitro with EMS. He denies palpitation, dizziness, syncope, N/V/diaphoresis, abdominal pain, dysuria, hematuria, focal weakness/numbness, dark stool or bleeding from other sites. Denies tobacco or alcohol use.      ED course: afebrile and vitals stable. Labs mostly unremarkable except mild elevated D-dimer 4.4. BNP and initial Troponin negative. EKG NSR @ 90 bpm with occasional PVCs. Patient received pepcid 20 mg IV x 1 in ED for heart burn. During my interview patient is awake, conversant and not in distress. He remained chest pain free and hemodynamically stable. Repeat troponin elevated to 49.      Past Medical History:   Diagnosis Date    Allergy     seasonal    Arthritis     Basal cell carcinoma 10 years    right  ear     Basal cell carcinoma 08/24/2017    Left earlobe     Basal cell carcinoma 11/14/2020    Left wrist    Basal cell carcinoma 05/18/2020    left nasal tip    CAD (coronary artery disease) 11/29/2012    Hyperlipidemia     Hypertension     Joint pain     PTSD (post-traumatic stress disorder)     SCC (squamous cell carcinoma) 12/12/2022    L dorsal hand-ED&C    Squamous cell carcinoma 06/26/2017    Squamous cell carcinoma of skin 10/2019    right cheek (cryosurgery)        Past Surgical History:   Procedure Laterality Date    CATARACT EXTRACTION      CORONARY ARTERY BYPASS GRAFT         Review of patient's allergies indicates:   Allergen Reactions    Iodine and iodide containing products Anaphylaxis    Adhesive     Dye Other (See Comments)    Iodinated contrast media     Trazodone      dizziness    Keflex [cephalexin] Diarrhea and Nausea And Vomiting    Penicillins Rash    Tylenol [acetaminophen] Other (See Comments)     Triggers PTSD/nightmares       No current facility-administered medications on file prior to encounter.     Current Outpatient Medications on File Prior to Encounter   Medication Sig    aspirin (ECOTRIN) 81 MG EC tablet Take 81 mg by mouth once daily.     atorvastatin (LIPITOR) 40 MG tablet Take 1 tablet (40 mg total) by mouth once daily.    cholecalciferol, vitamin D3, (VITAMIN D3) 50 mcg (2,000 unit) Tab Take 1 tablet by mouth once daily.    clobetasoL (TEMOVATE) 0.05 % external solution Pt to mix in 1 jar of cerave cream and apply to affected areas bid    clotrimazole-betamethasone 1-0.05% (LOTRISONE) cream Apply topically 2 (two) times daily.    lisinopriL 10 MG tablet Take 1 tablet (10 mg total) by mouth once daily.    metoprolol succinate (TOPROL-XL) 100 MG 24 hr tablet Take 1 tablet (100 mg total) by mouth once daily.    MULTIVITAMIN ORAL Take 1 tablet by mouth once daily.    nitroGLYCERIN (NITROSTAT) 0.4 MG SL tablet Place 1 tablet under the tongue as needed.     Family History        Problem Relation (Age of Onset)    Cancer Brother    Diabetes Sister    Leukemia Father    No Known Problems Daughter, Daughter, Daughter, Daughter, Son, Son    Peripheral vascular disease Sister    Skin cancer Mother          Tobacco Use    Smoking status: Former    Smokeless tobacco: Never   Substance and Sexual Activity    Alcohol use: Yes     Comment: occasionally    Drug use: No    Sexual activity: Not Currently     Partners: Female     Review of Systems   Constitutional:  Positive for fatigue. Negative for activity change, appetite change, chills, diaphoresis, fever and unexpected weight change.   HENT:  Negative for congestion, dental problem, drooling, ear discharge, ear pain, facial swelling, hearing loss, mouth sores, nosebleeds, postnasal drip, rhinorrhea, sinus pressure, sneezing, sore throat, tinnitus, trouble swallowing and voice change.    Eyes:  Negative for photophobia, pain, discharge, redness, itching and visual disturbance.   Respiratory:  Negative for apnea, cough, choking, chest tightness, shortness of breath, wheezing and stridor.    Cardiovascular:  Positive for chest pain. Negative for palpitations and leg swelling.   Gastrointestinal:  Negative for abdominal distention, abdominal pain, anal bleeding, blood in stool, constipation, diarrhea, nausea, rectal pain and vomiting.   Endocrine: Negative for cold intolerance, heat intolerance, polydipsia, polyphagia and polyuria.   Genitourinary:  Negative for decreased urine volume, difficulty urinating, dysuria, enuresis, flank pain, frequency, genital sores, hematuria, penile discharge, penile pain, penile swelling, scrotal swelling, testicular pain and urgency.   Musculoskeletal:  Negative for arthralgias, back pain, gait problem, joint swelling, myalgias, neck pain and neck stiffness.   Skin:  Negative for color change, pallor, rash and wound.   Allergic/Immunologic: Negative for environmental allergies, food allergies and immunocompromised  state.   Neurological:  Negative for dizziness, tremors, seizures, syncope, facial asymmetry, speech difficulty, weakness, light-headedness, numbness and headaches.        Tingling of bilateral hands.    Hematological:  Negative for adenopathy. Does not bruise/bleed easily.   Psychiatric/Behavioral:  Negative for agitation, behavioral problems, confusion, decreased concentration, dysphoric mood, hallucinations, self-injury, sleep disturbance and suicidal ideas. The patient is not nervous/anxious and is not hyperactive.      Objective:     Vital Signs (Most Recent):  Temp: 97 °F (36.1 °C) (12/11/24 2119)  Pulse: 73 (12/11/24 2333)  Resp: (!) 22 (12/11/24 2333)  BP: (!) 176/71 (12/11/24 2333)  SpO2: 95 % (12/11/24 2333) Vital Signs (24h Range):  Temp:  [97 °F (36.1 °C)] 97 °F (36.1 °C)  Pulse:  [] 73  Resp:  [18-22] 22  SpO2:  [95 %-97 %] 95 %  BP: (105-181)/(60-79) 176/71     Weight: 91.7 kg (202 lb 2.6 oz)  Body mass index is 27.42 kg/m².     Physical Exam  Constitutional:       General: He is not in acute distress.     Appearance: He is well-developed. He is not diaphoretic.   HENT:      Head: Normocephalic and atraumatic.      Nose: Nose normal.      Mouth/Throat:      Pharynx: No oropharyngeal exudate.   Eyes:      General: No scleral icterus.     Conjunctiva/sclera: Conjunctivae normal.      Pupils: Pupils are equal, round, and reactive to light.   Neck:      Thyroid: No thyromegaly.      Vascular: No JVD.      Trachea: No tracheal deviation.   Cardiovascular:      Rate and Rhythm: Normal rate and regular rhythm.      Heart sounds: Normal heart sounds. No murmur heard.  Pulmonary:      Effort: Pulmonary effort is normal. No respiratory distress.      Breath sounds: Normal breath sounds. No wheezing or rales.   Chest:      Chest wall: No tenderness.   Abdominal:      General: Bowel sounds are normal. There is no distension.      Palpations: Abdomen is soft. There is no mass.      Tenderness: There is no  abdominal tenderness. There is no guarding or rebound.   Musculoskeletal:         General: No tenderness. Normal range of motion.      Cervical back: Normal range of motion and neck supple.   Lymphadenopathy:      Cervical: No cervical adenopathy.   Skin:     General: Skin is warm and dry.      Findings: No erythema or rash.   Neurological:      Mental Status: He is alert and oriented to person, place, and time.      Cranial Nerves: No cranial nerve deficit.      Motor: No abnormal muscle tone.      Coordination: Coordination normal.      Deep Tendon Reflexes: Reflexes are normal and symmetric. Reflexes normal.   Psychiatric:         Thought Content: Thought content normal.         Judgment: Judgment normal.              CRANIAL NERVES     CN III, IV, VI   Pupils are equal, round, and reactive to light.       Significant Labs: All pertinent labs within the past 24 hours have been reviewed.  Recent Lab Results         12/11/24 2219 12/11/24 2212        Albumin   3.3       ALP   81       ALT   16       Anion Gap   8       AST   26       Baso #   0.05       Basophil %   0.6       BILIRUBIN TOTAL   0.3  Comment: For infants and newborns, interpretation of results should be based  on gestational age, weight and in agreement with clinical  observations.    Premature Infant recommended reference ranges:  Up to 24 hours.............<8.0 mg/dL  Up to 48 hours............<12.0 mg/dL  3-5 days..................<15.0 mg/dL  6-29 days.................<15.0 mg/dL         BNP   91  Comment: Values of less than 100 pg/ml are consistent with non-CHF populations.       BUN   27       Calcium   9.3       Chloride   111       CO2   20       Creatinine   1.0       D-Dimer 4.38  Comment: The quantitative D-dimer assay should be used as an aid in   the diagnosis of deep vein thrombosis and pulmonary embolism  in patients with the appropriate presentation and clinical  history. The upper limit of the reference interval and the clinical    cut off   point are identical. Causes of a positive (>0.50 mg/L FEU) D-Dimer   test  include, but are not limited to: DVT, PE, DIC, thrombolytic   therapy, anticoagulant therapy, recent surgery, trauma, or   pregnancy, disseminated malignancy, aortic aneurysm, cirrhosis,  and severe infection. False negative results may occur in   patients with distal DVT.           Differential Method   Automated       eGFR   >60.0       Eos #   0.1       Eos %   1.8       Glucose   119       Gran # (ANC)   5.7       Gran %   72.5       Hematocrit   32.9       Hemoglobin   10.9       Immature Grans (Abs)   0.04  Comment: Mild elevation in immature granulocytes is non specific and   can be seen in a variety of conditions including stress response,   acute inflammation, trauma and pregnancy. Correlation with other   laboratory and clinical findings is essential.         Immature Granulocytes   0.5       Lymph #   1.1       Lymph %   13.6       MCH   34.1       MCHC   33.1       MCV   103       Mono #   0.9       Mono %   11.0       MPV   10.8       nRBC   0       Platelet Count   226       Potassium   4.1       PROTEIN TOTAL   6.2       RBC   3.20       RDW   12.0       Sodium   139       Troponin I High Sensitivity   21       WBC   7.88               Significant Imaging: I have reviewed all pertinent imaging results/findings within the past 24 hours.  Assessment/Plan:     * NSTEMI (non-ST elevated myocardial infarction)  -patient presented with an episode of unstable angina which resolved prior to EMS arrival   -received aspirin and NTG SL by EMS   -EKG NSR with occasional PVCs   -initial high sensitivity troponin negative and mild elevated D-dimer   -remained chest pain free and hemodynamically stable in ED  -repeat troponin elevated to 49  -given h/o CAD s/p CABG, risk factors and presentation of unstable angina will start on NSTEMI pathway (DAPT + heparin infusion)   -telemetry monitoring, trend troponin, check echo in am    -follow up with cardiology recommendations         Coronary artery disease involving coronary bypass graft with unstable angina pectoris  Patient with known CAD s/p CABG, which is uncontrolled Will continue  heparin infusion, brilinta, ASA, and Statin, beat blocker and monitor for S/Sx of angina/ACS. Continue to monitor on telemetry.     Elevated d-dimer  -mild elevated d-dimer 4.4 in ED and V/Q scan ordered as patient unable to get CTA due to iodinated contrast allergy   -follow up V/Q scan result (test pending to be done in am)  -lower extremity doppler negative for DVT       Benign essential HTN  Patient's blood pressure range in the last 24 hours was: BP  Min: 105/61  Max: 186/107.The patient's inpatient anti-hypertensive regimen is listed below:  Current Antihypertensives  lisinopriL tablet 10 mg, Daily, Oral  metoprolol succinate (TOPROL-XL) 24 hr tablet 100 mg, Daily, Oral  nitroGLYCERIN SL tablet 0.4 mg, Every 5 min PRN, Sublingual    Plan  - BP is controlled, no changes needed to their regimen  - goal BP < 130/80    Dyslipidemia  -on high intensity statin for NSTEMI   -check lipid panel       GERD (gastroesophageal reflux disease)  -received pepcid in ED and will start on BID        VTE Risk Mitigation (From admission, onward)           Ordered     heparin 25,000 units in dextrose 5% (100 units/ml) IV bolus from bag LOW INTENSITY nomogram - [facility]  As needed (PRN)        Question:  Heparin Infusion Adjustment (DO NOT MODIFY ANSWER)  Answer:  [email or web address]\epic\Images\Pharmacy\HeparinInfusions\heparin LOW INTENSITY nomogram for [facility] [id number]    12/12/24 0629     heparin 25,000 units in dextrose 5% (100 units/ml) IV bolus from bag LOW INTENSITY nomogram - [facility]  As needed (PRN)        Question:  Heparin Infusion Adjustment (DO NOT MODIFY ANSWER)  Answer:  [email or web address]\epic\Images\Pharmacy\HeparinInfusions\heparin LOW INTENSITY nomogram for [facility] [id number]    12/12/24 0629     heparin 25,000 units in dextrose 5%  (100 units/ml) IV bolus from bag LOW INTENSITY nomogram - [facility]  Once        Question:  Heparin Infusion Adjustment (DO NOT MODIFY ANSWER)  Answer:  [email or web address]\Images\Pharmacy\HeparinInfusions\heparin LOW INTENSITY nomogram for [facility] [id number]    12/12/24 0629     heparin 25,000 units in dextrose 5% 250 mL (100 units/mL) infusion LOW INTENSITY nomogram - [facility]  Continuous        Question:  Begin at (units/kg/hr)  Answer:  12    12/12/24 0629     IP VTE HIGH RISK PATIENT  Once         12/12/24 0159     Place sequential compression device  Until discontinued         12/12/24 0159                       On 12/12/2024, patient should be placed in hospital observation services under my care.             Teofilo Weiss DO  Department of Hospital Medicine  [facility] - Emergency Dept

## 2024-12-12 NOTE — SUBJECTIVE & OBJECTIVE
Past Medical History:   Diagnosis Date    Allergy     seasonal    Arthritis     Basal cell carcinoma 10 years    right ear     Basal cell carcinoma 08/24/2017    Left earlobe     Basal cell carcinoma 11/14/2020    Left wrist    Basal cell carcinoma 05/18/2020    left nasal tip    CAD (coronary artery disease) 11/29/2012    Hyperlipidemia     Hypertension     Joint pain     PTSD (post-traumatic stress disorder)     SCC (squamous cell carcinoma) 12/12/2022    L dorsal hand-ED&C    Squamous cell carcinoma 06/26/2017    Squamous cell carcinoma of skin 10/2019    right cheek (cryosurgery)        Past Surgical History:   Procedure Laterality Date    CATARACT EXTRACTION      CORONARY ARTERY BYPASS GRAFT         Review of patient's allergies indicates:   Allergen Reactions    Iodine and iodide containing products Anaphylaxis    Adhesive     Dye Other (See Comments)    Iodinated contrast media     Trazodone      dizziness    Keflex [cephalexin] Diarrhea and Nausea And Vomiting    Penicillins Rash    Tylenol [acetaminophen] Other (See Comments)     Triggers PTSD/nightmares       (Not in a hospital admission)    Family History       Problem Relation (Age of Onset)    Cancer Brother    Diabetes Sister    Leukemia Father    No Known Problems Daughter, Daughter, Daughter, Daughter, Son, Son    Peripheral vascular disease Sister    Skin cancer Mother          Tobacco Use    Smoking status: Former    Smokeless tobacco: Never   Substance and Sexual Activity    Alcohol use: Yes     Comment: occasionally    Drug use: No    Sexual activity: Not Currently     Partners: Female     Review of Systems   Cardiovascular:  Negative for chest pain and dyspnea on exertion.     Objective:     Vital Signs (Most Recent):  Temp: 98.2 °F (36.8 °C) (12/12/24 1349)  Pulse: (!) 57 (12/12/24 1349)  Resp: 20 (12/12/24 1349)  BP: 138/69 (12/12/24 1349)  SpO2: 95 % (12/12/24 1349) Vital Signs (24h Range):  Temp:  [97 °F (36.1 °C)-98.2 °F (36.8 °C)] 98.2 °F  (36.8 °C)  Pulse:  [] 57  Resp:  [18-25] 20  SpO2:  [94 %-99 %] 95 %  BP: (105-186)/() 138/69     Weight: 91 kg (200 lb 9.9 oz)  Body mass index is 27.21 kg/m².    SpO2: 95 %       No intake or output data in the 24 hours ending 12/12/24 1541    Lines/Drains/Airways       Peripheral Intravenous Line  Duration                  Peripheral IV - Single Lumen 12/11/24 2303 18 G Right Antecubital <1 day                     Physical Exam  Constitutional:       General: He is not in acute distress.     Appearance: Normal appearance. He is not ill-appearing.   HENT:      Head: Normocephalic and atraumatic.      Nose: No congestion.      Mouth/Throat:      Mouth: Mucous membranes are moist.   Eyes:      Conjunctiva/sclera: Conjunctivae normal.   Cardiovascular:      Rate and Rhythm: Normal rate and regular rhythm.      Pulses: Normal pulses.   Pulmonary:      Effort: Pulmonary effort is normal. No respiratory distress.   Abdominal:      General: Abdomen is flat. There is no distension.      Palpations: Abdomen is soft.   Musculoskeletal:      Cervical back: Normal range of motion.      Right lower leg: No edema.      Left lower leg: No edema.   Skin:     Capillary Refill: Capillary refill takes less than 2 seconds.      Findings: No rash.   Neurological:      Mental Status: He is alert and oriented to person, place, and time.   Psychiatric:         Mood and Affect: Mood normal.            Significant Labs: All pertinent lab results from the last 24 hours have been reviewed.

## 2024-12-12 NOTE — CONSULTS
[facility] - Emergency Dept  Interventional Cardiology  Consult Note    Patient Name: Aleks Brown  MRN: [id number]  Admission Date: 12/11/2024  Hospital Length of Stay: 0 days  Code Status: Full Code   Attending Provider: Justen Garcia MD  Consulting Provider: Howie Rivas MD  Primary Care Physician: Deshaun Goel MD  Principal Problem:NSTEMI (non-ST elevated myocardial infarction)    Patient information was obtained from patient, past medical records, and ER records.     Inpatient consult to Interventional Cardiology  Consult performed by: Howie Rivas MD  Consult ordered by: Abdiel Haas MD        Subjective:     Chief Complaint:  chest pain      HPI:  89-year-old male with past medical history of CABG x 5 (1988 SVG to OM1 (sequential graft), SVG to OM2 (sequential graft), LIMA to LAD (single graft), SVG to PDA (single graft), HUY to D1 (single graft)) who presented for chest pain last night.  Patient reports he developed chest pain with radiation to back and down his arms and proximally 30 last night.  The resolve spontaneously after 20 minutes.  He had been hold EMS who transported him to [facility] ED.  On arrival he was hemodynamically stable although with mildly elevated systolic blood pressures.  His lab work was grossly unremarkable.  Troponin 50 x2.  EKG without acute ischemic changes.  Given his history and concern for NSTEMI he was started on ACS protocol.  Interventional Cardiology consulted for angiogram evaluation    Past Medical History:   Diagnosis Date    Allergy     seasonal    Arthritis     Basal cell carcinoma 10 years    right ear     Basal cell carcinoma 08/24/2017    Left earlobe     Basal cell carcinoma 11/14/2020    Left wrist    Basal cell carcinoma 05/18/2020    left nasal tip    CAD (coronary artery disease) 11/29/2012    Hyperlipidemia     Hypertension     Joint pain     PTSD (post-traumatic stress disorder)     SCC (squamous cell carcinoma) 12/12/2022    L dorsal hand-ED&C     Squamous cell carcinoma 06/26/2017    Squamous cell carcinoma of skin 10/2019    right cheek (cryosurgery)        Past Surgical History:   Procedure Laterality Date    CATARACT EXTRACTION      CORONARY ARTERY BYPASS GRAFT         Review of patient's allergies indicates:   Allergen Reactions    Iodine and iodide containing products Anaphylaxis    Adhesive     Dye Other (See Comments)    Iodinated contrast media     Trazodone      dizziness    Keflex [cephalexin] Diarrhea and Nausea And Vomiting    Penicillins Rash    Tylenol [acetaminophen] Other (See Comments)     Triggers PTSD/nightmares       (Not in a hospital admission)    Family History       Problem Relation (Age of Onset)    Cancer Brother    Diabetes Sister    Leukemia Father    No Known Problems Daughter, Daughter, Daughter, Daughter, Son, Son    Peripheral vascular disease Sister    Skin cancer Mother          Tobacco Use    Smoking status: Former    Smokeless tobacco: Never   Substance and Sexual Activity    Alcohol use: Yes     Comment: occasionally    Drug use: No    Sexual activity: Not Currently     Partners: Female     Review of Systems   Cardiovascular:  Negative for chest pain and dyspnea on exertion.     Objective:     Vital Signs (Most Recent):  Temp: 98.2 °F (36.8 °C) (12/12/24 1349)  Pulse: (!) 57 (12/12/24 1349)  Resp: 20 (12/12/24 1349)  BP: 138/69 (12/12/24 1349)  SpO2: 95 % (12/12/24 1349) Vital Signs (24h Range):  Temp:  [97 °F (36.1 °C)-98.2 °F (36.8 °C)] 98.2 °F (36.8 °C)  Pulse:  [] 57  Resp:  [18-25] 20  SpO2:  [94 %-99 %] 95 %  BP: (105-186)/() 138/69     Weight: 91 kg (200 lb 9.9 oz)  Body mass index is 27.21 kg/m².    SpO2: 95 %       No intake or output data in the 24 hours ending 12/12/24 1541    Lines/Drains/Airways       Peripheral Intravenous Line  Duration                  Peripheral IV - Single Lumen 12/11/24 2303 18 G Right Antecubital <1 day                     Physical Exam  Constitutional:       General: He  is not in acute distress.     Appearance: Normal appearance. He is not ill-appearing.   HENT:      Head: Normocephalic and atraumatic.      Nose: No congestion.      Mouth/Throat:      Mouth: Mucous membranes are moist.   Eyes:      Conjunctiva/sclera: Conjunctivae normal.   Cardiovascular:      Rate and Rhythm: Normal rate and regular rhythm.      Pulses: Normal pulses.   Pulmonary:      Effort: Pulmonary effort is normal. No respiratory distress.   Abdominal:      General: Abdomen is flat. There is no distension.      Palpations: Abdomen is soft.   Musculoskeletal:      Cervical back: Normal range of motion.      Right lower leg: No edema.      Left lower leg: No edema.   Skin:     Capillary Refill: Capillary refill takes less than 2 seconds.      Findings: No rash.   Neurological:      Mental Status: He is alert and oriented to person, place, and time.   Psychiatric:         Mood and Affect: Mood normal.            Significant Labs: All pertinent lab results from the last 24 hours have been reviewed.      Assessment and Plan:     Cardiac/Vascular  CAD (coronary artery disease)  89-year-old male with history of remote 5 vessel bypass here with chest pain.  He has had no further chest pain since initial episode.  His troponin level is unimpressive and flat.  Echocardiogram appears to have a preserved EF and no wall motion abnormalities.  EKG is without acute ischemic changes.    Recommendations  Given the above findings and mildly uncontrolled hypertension, presentation does not appear to be consistent with type 1 NSTEMI  Would optimize medical therapy with Imdur  Continue high-intensity statin, aspirin, and adequate blood pressure control.  He appears to be maxed out on his beta-blockade  Follow up with his outpatient cardiologist on discharge        VTE Risk Mitigation (From admission, onward)           Ordered     heparin 25,000 units in dextrose 5% (100 units/ml) IV bolus from bag LOW INTENSITY nomogram - [facility]   As needed (PRN)        Question:  Heparin Infusion Adjustment (DO NOT MODIFY ANSWER)  Answer:  [email or web address]\epic\Images\Pharmacy\HeparinInfusions\heparin LOW INTENSITY nomogram for [facility] [id number]    12/12/24 0629     heparin 25,000 units in dextrose 5% (100 units/ml) IV bolus from bag LOW INTENSITY nomogram - [facility]  As needed (PRN)        Question:  Heparin Infusion Adjustment (DO NOT MODIFY ANSWER)  Answer:  [email or web address]\epic\Images\Pharmacy\HeparinInfusions\heparin LOW INTENSITY nomogram for [facility] [id number].pdf    12/12/24 0629     heparin 25,000 units in dextrose 5% 250 mL (100 units/mL) infusion LOW INTENSITY nomogram - [facility]  Continuous        Question:  Begin at (units/kg/hr)  Answer:  12 12/12/24 0629     IP VTE HIGH RISK PATIENT  Once         12/12/24 0159     Place sequential compression device  Until discontinued         12/12/24 0159                        Howie Dasilvaor, MD  Interventional Cardiology   [facility] - Emergency Dept

## 2024-12-12 NOTE — ED TRIAGE NOTES
Aleks Brown, a 89 y.o. male presents to the ED w/ complaint of chest pain pta which has resolved on arrival to ER.     Triage note:  Chief Complaint   Patient presents with    Chest Pain     Crushing Mid sternal radiating down left arm at 2010 and lasted 5-10 mins; hx of bypass;htn; has now subsided; received 2 nitro sprays and 325 asa     Review of patient's allergies indicates:   Allergen Reactions    Iodine and iodide containing products Anaphylaxis    Adhesive     Dye Other (See Comments)    Iodinated contrast media     Trazodone      dizziness    Keflex [cephalexin] Diarrhea and Nausea And Vomiting    Penicillins Rash    Tylenol [acetaminophen] Other (See Comments)     Triggers PTSD/nightmares     Past Medical History:   Diagnosis Date    Allergy     seasonal    Arthritis     Basal cell carcinoma 10 years    right ear     Basal cell carcinoma 08/24/2017    Left earlobe     Basal cell carcinoma 11/14/2020    Left wrist    Basal cell carcinoma 05/18/2020    left nasal tip    CAD (coronary artery disease) 11/29/2012    Hyperlipidemia     Hypertension     Joint pain     PTSD (post-traumatic stress disorder)     SCC (squamous cell carcinoma) 12/12/2022    L dorsal hand-ED&C    Squamous cell carcinoma 06/26/2017    Squamous cell carcinoma of skin 10/2019    right cheek (cryosurgery)
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
## 2024-12-12 NOTE — HPI
89-year-old male with past medical history of CABG x 5 (1988 SVG to OM1 (sequential graft), SVG to OM2 (sequential graft), LIMA to LAD (single graft), SVG to PDA (single graft), HUY to D1 (single graft)) who presented for chest pain last night.  Patient reports he developed chest pain with radiation to back and down his arms and proximally 30 last night.  The resolve spontaneously after 20 minutes.  He had been hold EMS who transported him to [facility] ED.  On arrival he was hemodynamically stable although with mildly elevated systolic blood pressures.  His lab work was grossly unremarkable.  Troponin 50 x2.  EKG without acute ischemic changes.  Given his history and concern for NSTEMI he was started on ACS protocol.  Interventional Cardiology consulted for angiogram evaluation

## 2024-12-12 NOTE — ED NOTES
Patient identifiers for Aleks Brown 89 y.o. male checked and correct.  Chief Complaint   Patient presents with    Chest Pain     Crushing Mid sternal radiating down left arm at 2010 and lasted 5-10 mins; hx of bypass;htn; has now subsided; received 2 nitro sprays and 325 asa     Past Medical History:   Diagnosis Date    Allergy     seasonal    Arthritis     Basal cell carcinoma 10 years    right ear     Basal cell carcinoma 08/24/2017    Left earlobe     Basal cell carcinoma 11/14/2020    Left wrist    Basal cell carcinoma 05/18/2020    left nasal tip    CAD (coronary artery disease) 11/29/2012    Hyperlipidemia     Hypertension     Joint pain     PTSD (post-traumatic stress disorder)     SCC (squamous cell carcinoma) 12/12/2022    L dorsal hand-ED&C    Squamous cell carcinoma 06/26/2017    Squamous cell carcinoma of skin 10/2019    right cheek (cryosurgery)      Allergies reported:   Review of patient's allergies indicates:   Allergen Reactions    Iodine and iodide containing products Anaphylaxis    Adhesive     Dye Other (See Comments)    Iodinated contrast media     Trazodone      dizziness    Keflex [cephalexin] Diarrhea and Nausea And Vomiting    Penicillins Rash    Tylenol [acetaminophen] Other (See Comments)     Triggers PTSD/nightmares       Appearance: Pt awake, alert & oriented to person, place & time. Pt in no acute distress at present time. Pt is clean and well groomed with clothes appropriately fastened.   Skin: Skin warm, dry & intact. Color consistent with ethnicity. Mucous membranes moist. No breakdown or brusing noted.   Musculoskeletal: Patient moving all extremities well, no obvious swelling or deformities noted.   Respiratory: Respirations spontaneous, even, and non-labored. Visible chest rise noted. Airway is open and patent. No accessory muscle use noted.   Neurologic: Sensation is intact. Speech is clear and appropriate. Eyes open spontaneously, behavior appropriate to situation,  follows commands, facial expression symmetrical, bilateral hand grasp equal and even, purposeful motor response noted.  Cardiac: All peripheral pulses present. No Bilateral lower extremity edema. Cap refill is <3 seconds.  Abdomen: Abdomen soft, non distended, non tender to palpation.   : Pt voids independently, denies dysuria, hematuria, frequency.

## 2024-12-12 NOTE — ASSESSMENT & PLAN NOTE
-mild elevated d-dimer 4.4 in ED and V/Q scan ordered as patient unable to get CTA due to iodinated contrast allergy   -follow up V/Q scan result (test pending to be done in am)  -lower extremity doppler negative for DVT

## 2024-12-12 NOTE — HOSPITAL COURSE
Patient admitted for bout of chest pain. Troponins minimally elevated and stable. EKG without ischemic changes. Cardiology and interventional cardiology consulted due to significant CAD history. Cardiology recommending outpatient follow up. Interventional cardiology recommending no intervention and outpatient stress test. Also started on Imdur during this hospitalization for better blood pressure control. Patient without chest pain during admission. Patient seen and evaluated on day of discharge. Pt deemed appropriate for discharge. Plan discussed with pt, who was agreeable and amenable; medications were discussed and reviewed, outpatient follow-up scheduled, ER precautions were given, all questions were answered to the pt's satisfaction, and patient was subsequently discharged.

## 2024-12-12 NOTE — ASSESSMENT & PLAN NOTE
Patient's blood pressure range in the last 24 hours was: BP  Min: 105/61  Max: 186/107.The patient's inpatient anti-hypertensive regimen is listed below:  Current Antihypertensives  lisinopriL tablet 10 mg, Daily, Oral  metoprolol succinate (TOPROL-XL) 24 hr tablet 100 mg, Daily, Oral  nitroGLYCERIN SL tablet 0.4 mg, Every 5 min PRN, Sublingual    Plan  - BP is controlled, no changes needed to their regimen  - goal BP < 130/80

## 2024-12-12 NOTE — HPI
Aleks Brown is a 89 y.o. male with PMH HTN, hyperlipidemia, CAD s/p CABG 1989, peripheral neuropathy of lower extremities and unsteady gait who presented to ED with complaints of chest pain. Patient reports around 8 pm last evening he woke up from sleep with sharp pain across upper chest. He report pain as severe 10 out of 10, localized across upper chest and non radiating. He felt tingling in both of his hands during chest pain. Chest pain lasted about 15 minutes and resolved spontaneously before EMS arrival.  He got ASA and Nitro with EMS. He denies palpitation, dizziness, syncope, N/V/diaphoresis, abdominal pain, dysuria, hematuria, focal weakness/numbness, dark stool or bleeding from other sites. Denies tobacco or alcohol use.      ED course: afebrile and vitals stable. Labs mostly unremarkable except mild elevated D-dimer 4.4. BNP and initial Troponin negative. EKG NSR @ 90 bpm with occasional PVCs. Patient received pepcid 20 mg IV x 1 in ED for heart burn. During my interview patient is awake, conversant and not in distress. He remained chest pain free and hemodynamically stable. Repeat troponin elevated to 49.

## 2024-12-12 NOTE — CONSULTS
Letter regarding Phase II cardiac rehab was sent to patient, along with telephone # for [facility] cardiac re[facility].  Linda Montiel RN  Cardiac Rehab Nurse

## 2024-12-12 NOTE — SUBJECTIVE & OBJECTIVE
Past Medical History:   Diagnosis Date    Allergy     seasonal    Arthritis     Basal cell carcinoma 10 years    right ear     Basal cell carcinoma 08/24/2017    Left earlobe     Basal cell carcinoma 11/14/2020    Left wrist    Basal cell carcinoma 05/18/2020    left nasal tip    CAD (coronary artery disease) 11/29/2012    Hyperlipidemia     Hypertension     Joint pain     PTSD (post-traumatic stress disorder)     SCC (squamous cell carcinoma) 12/12/2022    L dorsal hand-ED&C    Squamous cell carcinoma 06/26/2017    Squamous cell carcinoma of skin 10/2019    right cheek (cryosurgery)        Past Surgical History:   Procedure Laterality Date    CATARACT EXTRACTION      CORONARY ARTERY BYPASS GRAFT         Review of patient's allergies indicates:   Allergen Reactions    Iodine and iodide containing products Anaphylaxis    Adhesive     Dye Other (See Comments)    Iodinated contrast media     Trazodone      dizziness    Keflex [cephalexin] Diarrhea and Nausea And Vomiting    Penicillins Rash    Tylenol [acetaminophen] Other (See Comments)     Triggers PTSD/nightmares       No current facility-administered medications on file prior to encounter.     Current Outpatient Medications on File Prior to Encounter   Medication Sig    aspirin (ECOTRIN) 81 MG EC tablet Take 81 mg by mouth once daily.     atorvastatin (LIPITOR) 40 MG tablet Take 1 tablet (40 mg total) by mouth once daily.    cholecalciferol, vitamin D3, (VITAMIN D3) 50 mcg (2,000 unit) Tab Take 1 tablet by mouth once daily.    clobetasoL (TEMOVATE) 0.05 % external solution Pt to mix in 1 jar of cerave cream and apply to affected areas bid    clotrimazole-betamethasone 1-0.05% (LOTRISONE) cream Apply topically 2 (two) times daily.    lisinopriL 10 MG tablet Take 1 tablet (10 mg total) by mouth once daily.    metoprolol succinate (TOPROL-XL) 100 MG 24 hr tablet Take 1 tablet (100 mg total) by mouth once daily.    MULTIVITAMIN ORAL Take 1 tablet by mouth once daily.     nitroGLYCERIN (NITROSTAT) 0.4 MG SL tablet Place 1 tablet under the tongue as needed.     Family History       Problem Relation (Age of Onset)    Cancer Brother    Diabetes Sister    Leukemia Father    No Known Problems Daughter, Daughter, Daughter, Daughter, Son, Son    Peripheral vascular disease Sister    Skin cancer Mother          Tobacco Use    Smoking status: Former    Smokeless tobacco: Never   Substance and Sexual Activity    Alcohol use: Yes     Comment: occasionally    Drug use: No    Sexual activity: Not Currently     Partners: Female     ROS  Objective:     Vital Signs (Most Recent):  Temp: 97.5 °F (36.4 °C) (12/12/24 0756)  Pulse: 77 (12/12/24 0803)  Resp: 20 (12/12/24 0803)  BP: (!) 171/68 (12/12/24 0803)  SpO2: 96 % (12/12/24 0803) Vital Signs (24h Range):  Temp:  [97 °F (36.1 °C)-97.5 °F (36.4 °C)] 97.5 °F (36.4 °C)  Pulse:  [] 77  Resp:  [18-25] 20  SpO2:  [94 %-97 %] 96 %  BP: (105-186)/() 171/68     Weight: 91.7 kg (202 lb 2.6 oz)  Body mass index is 27.42 kg/m².    SpO2: 96 %       No intake or output data in the 24 hours ending 12/12/24 0942    Lines/Drains/Airways       Peripheral Intravenous Line  Duration                  Peripheral IV - Single Lumen 12/11/24 2303 18 G Right Antecubital <1 day                     Physical Exam  Constitutional:       General: He is not in acute distress.     Appearance: He is not ill-appearing.   Eyes:      General:         Right eye: No discharge.         Left eye: No discharge.   Cardiovascular:      Rate and Rhythm: Normal rate and regular rhythm.      Pulses: Normal pulses.      Heart sounds: Normal heart sounds.   Pulmonary:      Effort: Pulmonary effort is normal.      Breath sounds: Normal breath sounds.   Abdominal:      General: Abdomen is flat.      Palpations: Abdomen is soft.   Musculoskeletal:      Right lower leg: No edema.      Left lower leg: No edema.   Neurological:      Mental Status: He is alert. Mental status is at  baseline.          Significant Labs:   Recent Lab Results  (Last 5 results in the past 24 hours)        12/12/24  0809   12/12/24  0629   12/12/24  0349   12/12/24  0201   12/11/24  2219        Albumin     3.2           ALP     81           ALT     15           Anion Gap     9           PTT 26.3  Comment: Refer to local heparin nomogram for intensity/dose specific   therapeutic   range.                  26.3  Comment: Refer to local heparin nomogram for intensity/dose specific   therapeutic   range.                 AST     26           Baso #     0.05           Basophil %     0.7           BILIRUBIN TOTAL     0.4  Comment: For infants and newborns, interpretation of results should be based  on gestational age, weight and in agreement with clinical  observations.    Premature Infant recommended reference ranges:  Up to 24 hours.............<8.0 mg/dL  Up to 48 hours............<12.0 mg/dL  3-5 days..................<15.0 mg/dL  6-29 days.................<15.0 mg/dL             BUN     28           Calcium     9.2           Chloride     111           Cholesterol Total 125  Comment: The National Cholesterol Education Program (NCEP) has set the  following guidelines (reference ranges) for Cholesterol:  Optimal.....................<200 mg/dL  Borderline High.............200-239 mg/dL  High........................> or = 240 mg/dL                 CO2     19           Creatinine     0.9           D-Dimer         4.38  Comment: The quantitative D-dimer assay should be used as an aid in   the diagnosis of deep vein thrombosis and pulmonary embolism  in patients with the appropriate presentation and clinical  history. The upper limit of the reference interval and the clinical   cut off   point are identical. Causes of a positive (>0.50 mg/L FEU) D-Dimer   test  include, but are not limited to: DVT, PE, DIC, thrombolytic   therapy, anticoagulant therapy, recent surgery, trauma, or   pregnancy, disseminated malignancy, aortic  aneurysm, cirrhosis,  and severe infection. False negative results may occur in   patients with distal DVT.         Differential Method     Automated           eGFR     >60.0           Eos #     0.2           Eos %     2.3           Estimated Avg Glucose 114               Glucose     103           Gran # (ANC)     4.4           Gran %     59.5           HDL 40  Comment: The National Cholesterol Education Program (NCEP) has set the  following guidelines (reference values) for HDL Cholesterol:  Low...............<40 mg/dL  Optimal...........>60 mg/dL                 HDL/Cholesterol Ratio 32.0               Hematocrit     33.1           Hemoglobin     10.6           Hemoglobin A1C External 5.6  Comment: ADA Screening Guidelines:  5.7-6.4%  Consistent with prediabetes  >or=6.5%  Consistent with diabetes    High levels of fetal hemoglobin interfere with the HbA1C  assay. Heterozygous hemoglobin variants (HbS, HgC, etc)do  not significantly interfere with this assay.   However, presence of multiple variants may affect accuracy.                 Immature Grans (Abs)     0.03  Comment: Mild elevation in immature granulocytes is non specific and   can be seen in a variety of conditions including stress response,   acute inflammation, trauma and pregnancy. Correlation with other   laboratory and clinical findings is essential.             Immature Granulocytes     0.4           INR 1.0  Comment: Coumadin Therapy:  2.0 - 3.0 for INR for all indicators except mechanical heart valves  and antiphospholipid syndromes which should use 2.5 - 3.5.                  1.0  Comment: Coumadin Therapy:  2.0 - 3.0 for INR for all indicators except mechanical heart valves  and antiphospholipid syndromes which should use 2.5 - 3.5.                 LDL Cholesterol 72.8  Comment: The National Cholesterol Education Program (NCEP) has set the  following guidelines (reference values) for LDL Cholesterol:  Optimal.......................<130  mg/dL  Borderline High...............130-159 mg/dL  High..........................160-189 mg/dL  Very High.....................>190 mg/dL                 Lymph #     1.7           Lymph %     22.3           Magnesium      1.8           MCH     33.5           MCHC     32.0           MCV     105           Mono #     1.1           Mono %     14.8           MPV     11.0           Non-HDL Cholesterol 85  Comment: Risk category and Non-HDL cholesterol goals:  Coronary heart disease (CHD)or equivalent (10-year risk of CHD >20%):  Non-HDL cholesterol goal     <130 mg/dL  Two or more CHD risk factors and 10-year risk of CHD <= 20%:  Non-HDL cholesterol goal     <160 mg/dL  0 to 1 CHD risk factor:  Non-HDL cholesterol goal     <190 mg/dL                 nRBC     0           Phosphorus Level     2.9           Platelet Count     230           Potassium     4.4           PROTEIN TOTAL     6.1           PT 10.9                10.9               RBC     3.16           RDW     12.1           Sodium     139           Total Cholesterol/HDL Ratio 3.1               Triglycerides 61  Comment: The National Cholesterol Education Program (NCEP) has set the  following guidelines (reference values) for triglycerides:  Normal......................<150 mg/dL  Borderline High.............150-199 mg/dL  High........................200-499 mg/dL                 Troponin I High Sensitivity 49   49     49          49               WBC     7.45

## 2024-12-12 NOTE — ASSESSMENT & PLAN NOTE
-patient presented with an episode of unstable angina which resolved prior to EMS arrival   -received aspirin and NTG SL by EMS   -EKG NSR with occasional PVCs   -initial high sensitivity troponin negative and mild elevated D-dimer   -remained chest pain free and hemodynamically stable in ED  -repeat troponin elevated to 49  -given h/o CAD s/p CABG, risk factors and presentation of unstable angina will start on NSTEMI pathway (DAPT + heparin infusion)   -telemetry monitoring, trend troponin, check echo in am   -follow up with cardiology recommendations

## 2024-12-12 NOTE — SUBJECTIVE & OBJECTIVE
Past Medical History:   Diagnosis Date    Allergy     seasonal    Arthritis     Basal cell carcinoma 10 years    right ear     Basal cell carcinoma 08/24/2017    Left earlobe     Basal cell carcinoma 11/14/2020    Left wrist    Basal cell carcinoma 05/18/2020    left nasal tip    CAD (coronary artery disease) 11/29/2012    Hyperlipidemia     Hypertension     Joint pain     PTSD (post-traumatic stress disorder)     SCC (squamous cell carcinoma) 12/12/2022    L dorsal hand-ED&C    Squamous cell carcinoma 06/26/2017    Squamous cell carcinoma of skin 10/2019    right cheek (cryosurgery)        Past Surgical History:   Procedure Laterality Date    CATARACT EXTRACTION      CORONARY ARTERY BYPASS GRAFT         Review of patient's allergies indicates:   Allergen Reactions    Iodine and iodide containing products Anaphylaxis    Adhesive     Dye Other (See Comments)    Iodinated contrast media     Trazodone      dizziness    Keflex [cephalexin] Diarrhea and Nausea And Vomiting    Penicillins Rash    Tylenol [acetaminophen] Other (See Comments)     Triggers PTSD/nightmares       No current facility-administered medications on file prior to encounter.     Current Outpatient Medications on File Prior to Encounter   Medication Sig    aspirin (ECOTRIN) 81 MG EC tablet Take 81 mg by mouth once daily.     atorvastatin (LIPITOR) 40 MG tablet Take 1 tablet (40 mg total) by mouth once daily.    cholecalciferol, vitamin D3, (VITAMIN D3) 50 mcg (2,000 unit) Tab Take 1 tablet by mouth once daily.    clobetasoL (TEMOVATE) 0.05 % external solution Pt to mix in 1 jar of cerave cream and apply to affected areas bid    clotrimazole-betamethasone 1-0.05% (LOTRISONE) cream Apply topically 2 (two) times daily.    lisinopriL 10 MG tablet Take 1 tablet (10 mg total) by mouth once daily.    metoprolol succinate (TOPROL-XL) 100 MG 24 hr tablet Take 1 tablet (100 mg total) by mouth once daily.    MULTIVITAMIN ORAL Take 1 tablet by mouth once daily.     nitroGLYCERIN (NITROSTAT) 0.4 MG SL tablet Place 1 tablet under the tongue as needed.     Family History       Problem Relation (Age of Onset)    Cancer Brother    Diabetes Sister    Leukemia Father    No Known Problems Daughter, Daughter, Daughter, Daughter, Son, Son    Peripheral vascular disease Sister    Skin cancer Mother          Tobacco Use    Smoking status: Former    Smokeless tobacco: Never   Substance and Sexual Activity    Alcohol use: Yes     Comment: occasionally    Drug use: No    Sexual activity: Not Currently     Partners: Female     Review of Systems   Constitutional:  Positive for fatigue. Negative for activity change, appetite change, chills, diaphoresis, fever and unexpected weight change.   HENT:  Negative for congestion, dental problem, drooling, ear discharge, ear pain, facial swelling, hearing loss, mouth sores, nosebleeds, postnasal drip, rhinorrhea, sinus pressure, sneezing, sore throat, tinnitus, trouble swallowing and voice change.    Eyes:  Negative for photophobia, pain, discharge, redness, itching and visual disturbance.   Respiratory:  Negative for apnea, cough, choking, chest tightness, shortness of breath, wheezing and stridor.    Cardiovascular:  Positive for chest pain. Negative for palpitations and leg swelling.   Gastrointestinal:  Negative for abdominal distention, abdominal pain, anal bleeding, blood in stool, constipation, diarrhea, nausea, rectal pain and vomiting.   Endocrine: Negative for cold intolerance, heat intolerance, polydipsia, polyphagia and polyuria.   Genitourinary:  Negative for decreased urine volume, difficulty urinating, dysuria, enuresis, flank pain, frequency, genital sores, hematuria, penile discharge, penile pain, penile swelling, scrotal swelling, testicular pain and urgency.   Musculoskeletal:  Negative for arthralgias, back pain, gait problem, joint swelling, myalgias, neck pain and neck stiffness.   Skin:  Negative for color change, pallor, rash  and wound.   Allergic/Immunologic: Negative for environmental allergies, food allergies and immunocompromised state.   Neurological:  Negative for dizziness, tremors, seizures, syncope, facial asymmetry, speech difficulty, weakness, light-headedness, numbness and headaches.        Tingling of bilateral hands.    Hematological:  Negative for adenopathy. Does not bruise/bleed easily.   Psychiatric/Behavioral:  Negative for agitation, behavioral problems, confusion, decreased concentration, dysphoric mood, hallucinations, self-injury, sleep disturbance and suicidal ideas. The patient is not nervous/anxious and is not hyperactive.      Objective:     Vital Signs (Most Recent):  Temp: 97 °F (36.1 °C) (12/11/24 2119)  Pulse: 73 (12/11/24 2333)  Resp: (!) 22 (12/11/24 2333)  BP: (!) 176/71 (12/11/24 2333)  SpO2: 95 % (12/11/24 2333) Vital Signs (24h Range):  Temp:  [97 °F (36.1 °C)] 97 °F (36.1 °C)  Pulse:  [] 73  Resp:  [18-22] 22  SpO2:  [95 %-97 %] 95 %  BP: (105-181)/(60-79) 176/71     Weight: 91.7 kg (202 lb 2.6 oz)  Body mass index is 27.42 kg/m².     Physical Exam  Constitutional:       General: He is not in acute distress.     Appearance: He is well-developed. He is not diaphoretic.   HENT:      Head: Normocephalic and atraumatic.      Nose: Nose normal.      Mouth/Throat:      Pharynx: No oropharyngeal exudate.   Eyes:      General: No scleral icterus.     Conjunctiva/sclera: Conjunctivae normal.      Pupils: Pupils are equal, round, and reactive to light.   Neck:      Thyroid: No thyromegaly.      Vascular: No JVD.      Trachea: No tracheal deviation.   Cardiovascular:      Rate and Rhythm: Normal rate and regular rhythm.      Heart sounds: Normal heart sounds. No murmur heard.  Pulmonary:      Effort: Pulmonary effort is normal. No respiratory distress.      Breath sounds: Normal breath sounds. No wheezing or rales.   Chest:      Chest wall: No tenderness.   Abdominal:      General: Bowel sounds are  normal. There is no distension.      Palpations: Abdomen is soft. There is no mass.      Tenderness: There is no abdominal tenderness. There is no guarding or rebound.   Musculoskeletal:         General: No tenderness. Normal range of motion.      Cervical back: Normal range of motion and neck supple.   Lymphadenopathy:      Cervical: No cervical adenopathy.   Skin:     General: Skin is warm and dry.      Findings: No erythema or rash.   Neurological:      Mental Status: He is alert and oriented to person, place, and time.      Cranial Nerves: No cranial nerve deficit.      Motor: No abnormal muscle tone.      Coordination: Coordination normal.      Deep Tendon Reflexes: Reflexes are normal and symmetric. Reflexes normal.   Psychiatric:         Thought Content: Thought content normal.         Judgment: Judgment normal.              CRANIAL NERVES     CN III, IV, VI   Pupils are equal, round, and reactive to light.       Significant Labs: All pertinent labs within the past 24 hours have been reviewed.  Recent Lab Results         12/11/24 2219 12/11/24  2212        Albumin   3.3       ALP   81       ALT   16       Anion Gap   8       AST   26       Baso #   0.05       Basophil %   0.6       BILIRUBIN TOTAL   0.3  Comment: For infants and newborns, interpretation of results should be based  on gestational age, weight and in agreement with clinical  observations.    Premature Infant recommended reference ranges:  Up to 24 hours.............<8.0 mg/dL  Up to 48 hours............<12.0 mg/dL  3-5 days..................<15.0 mg/dL  6-29 days.................<15.0 mg/dL         BNP   91  Comment: Values of less than 100 pg/ml are consistent with non-CHF populations.       BUN   27       Calcium   9.3       Chloride   111       CO2   20       Creatinine   1.0       D-Dimer 4.38  Comment: The quantitative D-dimer assay should be used as an aid in   the diagnosis of deep vein thrombosis and pulmonary embolism  in patients  with the appropriate presentation and clinical  history. The upper limit of the reference interval and the clinical   cut off   point are identical. Causes of a positive (>0.50 mg/L FEU) D-Dimer   test  include, but are not limited to: DVT, PE, DIC, thrombolytic   therapy, anticoagulant therapy, recent surgery, trauma, or   pregnancy, disseminated malignancy, aortic aneurysm, cirrhosis,  and severe infection. False negative results may occur in   patients with distal DVT.           Differential Method   Automated       eGFR   >60.0       Eos #   0.1       Eos %   1.8       Glucose   119       Gran # (ANC)   5.7       Gran %   72.5       Hematocrit   32.9       Hemoglobin   10.9       Immature Grans (Abs)   0.04  Comment: Mild elevation in immature granulocytes is non specific and   can be seen in a variety of conditions including stress response,   acute inflammation, trauma and pregnancy. Correlation with other   laboratory and clinical findings is essential.         Immature Granulocytes   0.5       Lymph #   1.1       Lymph %   13.6       MCH   34.1       MCHC   33.1       MCV   103       Mono #   0.9       Mono %   11.0       MPV   10.8       nRBC   0       Platelet Count   226       Potassium   4.1       PROTEIN TOTAL   6.2       RBC   3.20       RDW   12.0       Sodium   139       Troponin I High Sensitivity   21       WBC   7.88               Significant Imaging: I have reviewed all pertinent imaging results/findings within the past 24 hours.

## 2024-12-16 ENCOUNTER — OFFICE VISIT (OUTPATIENT)
Dept: DERMATOLOGY | Facility: CLINIC | Age: 89
End: 2024-12-16
Payer: MEDICARE

## 2024-12-16 DIAGNOSIS — L82.1 SK (SEBORRHEIC KERATOSIS): ICD-10-CM

## 2024-12-16 DIAGNOSIS — Z85.828 HISTORY OF NONMELANOMA SKIN CANCER: ICD-10-CM

## 2024-12-16 DIAGNOSIS — L57.0 AK (ACTINIC KERATOSIS): Primary | ICD-10-CM

## 2024-12-16 PROCEDURE — 99499 UNLISTED E&M SERVICE: CPT | Mod: S$PBB,,, | Performed by: DERMATOLOGY

## 2024-12-16 PROCEDURE — 99213 OFFICE O/P EST LOW 20 MIN: CPT | Mod: PBBFAC | Performed by: DERMATOLOGY

## 2024-12-16 PROCEDURE — 99999 PR PBB SHADOW E&M-EST. PATIENT-LVL III: CPT | Mod: PBBFAC,,, | Performed by: DERMATOLOGY

## 2024-12-16 PROCEDURE — 17000 DESTRUCT PREMALG LESION: CPT | Mod: S$PBB,,, | Performed by: DERMATOLOGY

## 2024-12-16 PROCEDURE — 17000 DESTRUCT PREMALG LESION: CPT | Mod: PBBFAC | Performed by: DERMATOLOGY

## 2024-12-16 NOTE — PROGRESS NOTES
Subjective:      Patient ID:  Aleks Brown is a 89 y.o. male who presents for   Chief Complaint   Patient presents with    Skin Check     UBSE      History of Present Illness: The patient presents for follow up of skin check.    The patient was last seen on: 09/16/2024 for ED&C of BCC of the L post ear.     This is a high risk patient here to check for the development of new lesions.     Other complaints: none         Review of Systems   Skin:  Positive for activity-related sunscreen use and wears hat (always). Negative for daily sunscreen use and recent sunburn.   Hematologic/Lymphatic: Bruises/bleeds easily (on asa).       Objective:   Physical Exam   Constitutional: He appears well-developed and well-nourished. No distress.   Neurological: He is alert and oriented to person, place, and time. He is not disoriented.   Psychiatric: He has a normal mood and affect.   Skin:   Areas Examined (abnormalities noted in diagram):   Scalp / Hair Palpated and Inspected  Head / Face Inspection Performed                         Diagram Legend     Erythematous scaling macule/papule c/w actinic keratosis       Vascular papule c/w angioma      Pigmented verrucoid papule/plaque c/w seborrheic keratosis      Yellow umbilicated papule c/w sebaceous hyperplasia      Irregularly shaped tan macule c/w lentigo     1-2 mm smooth white papules consistent with Milia      Movable subcutaneous cyst with punctum c/w epidermal inclusion cyst      Subcutaneous movable cyst c/w pilar cyst      Firm pink to brown papule c/w dermatofibroma      Pedunculated fleshy papule(s) c/w skin tag(s)      Evenly pigmented macule c/w junctional nevus     Mildly variegated pigmented, slightly irregular-bordered macule c/w mildly atypical nevus      Flesh colored to evenly pigmented papule c/w intradermal nevus       Pink pearly papule/plaque c/w basal cell carcinoma      Erythematous hyperkeratotic cursted plaque c/w SCC      Surgical scar with no sign  of skin cancer recurrence      Open and closed comedones      Inflammatory papules and pustules      Verrucoid papule consistent consistent with wart     Erythematous eczematous patches and plaques     Dystrophic onycholytic nail with subungual debris c/w onychomycosis     Umbilicated papule    Erythematous-base heme-crusted tan verrucoid plaque consistent with inflamed seborrheic keratosis     Erythematous Silvery Scaling Plaque c/w Psoriasis     See annotation      Assessment / Plan:        AK (actinic keratosis)  Cryosurgery Procedure Note    Verbal consent from the patient is obtained including, but not limited to, risk of hypopigmentation/hyperpigmentation, scar, recurrence of lesion. The patient is aware of the precancerous quality and need for treatment of these lesions. Liquid nitrogen cryosurgery is applied to the 1 actinic keratoses, as detailed in the physical exam, to produce a freeze injury. The patient is aware that blisters may form and is instructed on wound care with gentle cleansing and use of vaseline ointment to keep moist until healed. The patient is supplied a handout on cryosurgery and is instructed to call if lesions do not completely resolve.     SK (seborrheic keratosis)   - minor problem and chronic.   Reassurance given to patient. No treatment necessary.      History of nonmelanoma skin cancer  Area(s) of previous NMSC evaluated with no signs of recurrence.    No lesions suspicious for malignancy noted.    Recommend daily sun protection/avoidance and use of at least SPF 30, broad spectrum sunscreen (OTC drug).             Follow up in about 6 months (around 6/16/2025) for UBSE.

## 2024-12-16 NOTE — PATIENT INSTRUCTIONS

## 2024-12-17 ENCOUNTER — PATIENT OUTREACH (OUTPATIENT)
Dept: ADMINISTRATIVE | Facility: CLINIC | Age: 89
End: 2024-12-17
Payer: MEDICARE

## 2024-12-17 NOTE — PROGRESS NOTES
No outreaches to be attempted for this encounter only as the patient is non-applicable for a TCC post-discharge follow up call due to being admitted to ED observation.

## 2024-12-27 ENCOUNTER — HOSPITAL ENCOUNTER (EMERGENCY)
Facility: HOSPITAL | Age: 89
Discharge: HOME | End: 2024-12-27
Attending: EMERGENCY MEDICINE
Payer: MEDICARE

## 2024-12-27 VITALS
RESPIRATION RATE: 18 BRPM | OXYGEN SATURATION: 97 % | TEMPERATURE: 98 F | HEART RATE: 69 BPM | SYSTOLIC BLOOD PRESSURE: 153 MMHG | DIASTOLIC BLOOD PRESSURE: 69 MMHG

## 2024-12-27 DIAGNOSIS — R07.9 CHEST PAIN, UNSPECIFIED TYPE: Primary | ICD-10-CM

## 2024-12-27 DIAGNOSIS — R07.9 CHEST PAIN: ICD-10-CM

## 2024-12-27 LAB
ALBUMIN SERPL BCP-MCNC: 3.5 G/DL (ref 3.5–5.2)
ALP SERPL-CCNC: 76 U/L (ref 40–150)
ALT SERPL W/O P-5'-P-CCNC: 17 U/L (ref 10–44)
ANION GAP SERPL CALC-SCNC: 11 MMOL/L (ref 8–16)
AST SERPL-CCNC: 26 U/L (ref 10–40)
BASOPHILS # BLD AUTO: 0.05 K/UL (ref 0–0.2)
BASOPHILS NFR BLD: 0.6 % (ref 0–1.9)
BILIRUB SERPL-MCNC: 0.4 MG/DL (ref 0.1–1)
BNP SERPL-MCNC: 69 PG/ML (ref 0–99)
BUN SERPL-MCNC: 30 MG/DL (ref 10–30)
CALCIUM SERPL-MCNC: 9.3 MG/DL (ref 8.7–10.5)
CHLORIDE SERPL-SCNC: 107 MMOL/L (ref 95–110)
CO2 SERPL-SCNC: 20 MMOL/L (ref 23–29)
CREAT SERPL-MCNC: 1.1 MG/DL (ref 0.5–1.4)
DIFFERENTIAL METHOD BLD: ABNORMAL
EOSINOPHIL # BLD AUTO: 0.2 K/UL (ref 0–0.5)
EOSINOPHIL NFR BLD: 2.4 % (ref 0–8)
ERYTHROCYTE [DISTWIDTH] IN BLOOD BY AUTOMATED COUNT: 12.8 % (ref 11.5–14.5)
EST. GFR  (NO RACE VARIABLE): >60 ML/MIN/1.73 M^2
GLUCOSE SERPL-MCNC: 134 MG/DL (ref 70–110)
HCT VFR BLD AUTO: 31.2 % (ref 40–54)
HGB BLD-MCNC: 10.5 G/DL (ref 14–18)
IMM GRANULOCYTES # BLD AUTO: 0.02 K/UL (ref 0–0.04)
IMM GRANULOCYTES NFR BLD AUTO: 0.3 % (ref 0–0.5)
LYMPHOCYTES # BLD AUTO: 1.7 K/UL (ref 1–4.8)
LYMPHOCYTES NFR BLD: 21.7 % (ref 18–48)
MCH RBC QN AUTO: 34.3 PG (ref 27–31)
MCHC RBC AUTO-ENTMCNC: 33.7 G/DL (ref 32–36)
MCV RBC AUTO: 102 FL (ref 82–98)
MONOCYTES # BLD AUTO: 1 K/UL (ref 0.3–1)
MONOCYTES NFR BLD: 12.5 % (ref 4–15)
NEUTROPHILS # BLD AUTO: 4.9 K/UL (ref 1.8–7.7)
NEUTROPHILS NFR BLD: 62.5 % (ref 38–73)
NRBC BLD-RTO: 0 /100 WBC
PLATELET # BLD AUTO: 214 K/UL (ref 150–450)
PMV BLD AUTO: 10.4 FL (ref 9.2–12.9)
POTASSIUM SERPL-SCNC: 4.3 MMOL/L (ref 3.5–5.1)
PROT SERPL-MCNC: 6.3 G/DL (ref 6–8.4)
RBC # BLD AUTO: 3.06 M/UL (ref 4.6–6.2)
SODIUM SERPL-SCNC: 138 MMOL/L (ref 136–145)
TROPONIN I SERPL DL<=0.01 NG/ML-MCNC: 14 NG/L (ref 0–35)
TROPONIN I SERPL DL<=0.01 NG/ML-MCNC: 17 NG/L (ref 0–35)
WBC # BLD AUTO: 7.83 K/UL (ref 3.9–12.7)

## 2024-12-27 PROCEDURE — 80053 COMPREHEN METABOLIC PANEL: CPT | Performed by: STUDENT IN AN ORGANIZED HEALTH CARE EDUCATION/TRAINING PROGRAM

## 2024-12-27 PROCEDURE — 93010 ELECTROCARDIOGRAM REPORT: CPT | Mod: ,,, | Performed by: INTERNAL MEDICINE

## 2024-12-27 PROCEDURE — 85025 COMPLETE CBC W/AUTO DIFF WBC: CPT | Performed by: STUDENT IN AN ORGANIZED HEALTH CARE EDUCATION/TRAINING PROGRAM

## 2024-12-27 PROCEDURE — 84484 ASSAY OF TROPONIN QUANT: CPT | Mod: 91 | Performed by: STUDENT IN AN ORGANIZED HEALTH CARE EDUCATION/TRAINING PROGRAM

## 2024-12-27 PROCEDURE — 99285 EMERGENCY DEPT VISIT HI MDM: CPT | Mod: 25

## 2024-12-27 PROCEDURE — 93005 ELECTROCARDIOGRAM TRACING: CPT

## 2024-12-27 PROCEDURE — 83880 ASSAY OF NATRIURETIC PEPTIDE: CPT | Performed by: STUDENT IN AN ORGANIZED HEALTH CARE EDUCATION/TRAINING PROGRAM

## 2024-12-28 LAB
OHS QRS DURATION: 104 MS
OHS QTC CALCULATION: 472 MS

## 2024-12-28 NOTE — ED PROVIDER NOTES
Encounter Date: 12/27/2024       History     Chief Complaint   Patient presents with    Chest Pain     Pt brought to ED via EMS from home. Pt states that he bent over to feed his cats and became SOB and C/P and weak. Pt states he feels better upon arrival.      HPI patient is a 89-year-old male with a past medical history of CAD, status post CABG in the 1980s, hypertension, hyperlipidemia with a recent admission for chest pain who was brought in by ambulance from home with concern for chest pain and presyncope.  The patient explains that he walks from his shed to his porch to feed his cats, bent forward and on standing up felt lightheaded and had some chest pain.  Upon sitting down in using nitroglycerin, his symptoms completely resolved but family had called 911.  The patient is currently asymptomatic.  He explains that he had a recent admission and was told he likely has angina and has a planned follow up with Cardiology.  The patient states that he was told he likely needs a heart catheterization but he is uninterested in have any catheterization.  Patient denies any recent cough, fevers chills, no increased lower extremity edema, no abdominal pain, no nausea vomiting diarrhea.  Review of patient's allergies indicates:   Allergen Reactions    Iodine and iodide containing products Anaphylaxis    Adhesive     Dye Other (See Comments)    Iodinated contrast media     Trazodone      dizziness    Keflex [cephalexin] Diarrhea and Nausea And Vomiting    Penicillins Rash    Tylenol [acetaminophen] Other (See Comments)     Triggers PTSD/nightmares     Past Medical History:   Diagnosis Date    Allergy     seasonal    Arthritis     Basal cell carcinoma 10 years    right ear     Basal cell carcinoma 08/24/2017    Left earlobe     Basal cell carcinoma 11/14/2020    Left wrist    Basal cell carcinoma 05/18/2020    left nasal tip    CAD (coronary artery disease) 11/29/2012    Hyperlipidemia     Hypertension     Joint pain      PTSD (post-traumatic stress disorder)     SCC (squamous cell carcinoma) 12/12/2022    L dorsal hand-ED&C    Squamous cell carcinoma 06/26/2017    Squamous cell carcinoma of skin 10/2019    right cheek (cryosurgery)      Past Surgical History:   Procedure Laterality Date    CATARACT EXTRACTION      CORONARY ARTERY BYPASS GRAFT       Family History   Problem Relation Name Age of Onset    Skin cancer Mother      Leukemia Father      Diabetes Sister      Peripheral vascular disease Sister      Cancer Brother      No Known Problems Daughter      No Known Problems Daughter      No Known Problems Daughter      No Known Problems Daughter      No Known Problems Son      No Known Problems Son      Melanoma Neg Hx       Social History     Tobacco Use    Smoking status: Former    Smokeless tobacco: Never   Substance Use Topics    Alcohol use: Yes     Comment: occasionally    Drug use: No         Physical Exam     Initial Vitals [12/27/24 1907]   BP Pulse Resp Temp SpO2   (!) 157/110 75 18 98.1 °F (36.7 °C) 98 %      MAP       --         Physical Exam  Nursing note and vitals reviewed.  Constitutional: Patient appears well-developed and well-nourished. No distress.   HENT:   Head: Normocephalic and atraumatic.   Eyes: Conjunctivae and EOM are normal. Pupils are equal, round, and reactive to light.   Neck: Neck supple.   Normal range of motion.  Cardiovascular: Normal rate, regular rhythm, normal heart sounds and intact distal pulses.   Pulmonary/Chest: Breath sounds normal.   Abdominal: Abdomen is soft. Patient exhibits no distension. There is no abdominal tenderness.   Musculoskeletal:      Cervical back: Normal range of motion and neck supple.   Neurological: Patient is alert and oriented to person, place, and time. No cranial nerve deficit.  GCS score is 15.    Skin: Skin is warm and dry.  Psych: Normal mood/affect    ED Course   Procedures  Labs Reviewed   CBC W/ AUTO DIFFERENTIAL - Abnormal       Result Value    WBC 7.83       RBC 3.06 (*)     Hemoglobin 10.5 (*)     Hematocrit 31.2 (*)      (*)     MCH 34.3 (*)     MCHC 33.7      RDW 12.8      Platelets 214      MPV 10.4      Immature Granulocytes 0.3      Gran # (ANC) 4.9      Immature Grans (Abs) 0.02      Lymph # 1.7      Mono # 1.0      Eos # 0.2      Baso # 0.05      nRBC 0      Gran % 62.5      Lymph % 21.7      Mono % 12.5      Eosinophil % 2.4      Basophil % 0.6      Differential Method Automated     COMPREHENSIVE METABOLIC PANEL - Abnormal    Sodium 138      Potassium 4.3      Chloride 107      CO2 20 (*)     Glucose 134 (*)     BUN 30      Creatinine 1.1      Calcium 9.3      Total Protein 6.3      Albumin 3.5      Total Bilirubin 0.4      Alkaline Phosphatase 76      AST 26      ALT 17      eGFR >60.0      Anion Gap 11     TROPONIN I HIGH SENSITIVITY    Troponin I High Sensitivity 14     TROPONIN I HIGH SENSITIVITY    Troponin I High Sensitivity 17     B-TYPE NATRIURETIC PEPTIDE    BNP 69          ECG Results              EKG 12-lead (Final result)        Collection Time Result Time QRS Duration OHS QTC Calculation    12/27/24 19:14:28 12/28/24 08:59:50 104 472                     Final result by Interface, Lab In [facility] (12/28/24 08:59:57)                   Narrative:    Test Reason : R07.9,    Vent. Rate :  82 BPM     Atrial Rate :  82 BPM     P-R Int : 190 ms          QRS Dur : 104 ms      QT Int : 404 ms       P-R-T Axes :  64   3  72 degrees    QTcB Int : 472 ms    Normal sinus rhythm with sinus arrhythmia  Normal ECG  When compared with ECG of 11-Dec-2024 21:26,  Premature ventricular complexes are no longer Present  Confirmed by Carlos Manuel Sandoval ([id number]) on 12/28/2024 8:59:44 AM    Referred By: AAAREFERRAL SELF           Confirmed By: Carlos Manuel Sandoval                                  Imaging Results              X-Ray Chest AP Portable (Final result)  Result time 12/27/24 22:01:31      Final result by George Herrera MD (12/27/24 22:01:31)                    Impression:      No detrimental change or radiographic acute intrathoracic process seen on this single view.      Electronically signed by: George Herrera MD  Date:    12/27/2024  Time:    22:01               Narrative:    EXAMINATION:  XR CHEST AP PORTABLE    CLINICAL HISTORY:  Chest Pain;    TECHNIQUE:  Single frontal view of the chest was performed.    COMPARISON:  Chest radiograph 12/11/2024, chest CT 10/26/2009    FINDINGS:  Patient is slightly rotated.  Postoperative changes of the chest as before.  Cardiomediastinal silhouette is midline with similar calcification and tortuosity of the aorta, stable.  Heart is not significantly enlarged.  Pulmonary vasculature and hilar contours are within normal limits.  Bibasilar mild platelike scarring versus atelectasis.  Suspected skin fold at the lateral upper right lung zone.  The lungs are otherwise well expanded without large consolidation, pleural effusion or pneumothorax.  No acute osseous process seen.  PA and lateral views can be obtained.                                       Medications - No data to display  Medical Decision Making                 I have personally reviewed and interpreted the patients laboratory studies:  Mild anemia with hemoglobin 10, creatinine within normal limits on CMP, high sensitivity troponin 14-17  I have personally reviewed and interpreted imaging studies: CXR. No evidence of consolidation, cardiomegaly, pulmonary edema, pneumothroax    I have personally reviewed and interpreted the patient's EKG:  Normal sinus rhythm at 82 beats per minute, ,       I have also reviewed the following:   external records EKG from 12/11/2024 with similar morphology however occasional PVCs noted at that time      Differential diagnosis of patient's symptoms is broad.    Considered PE, less likely  without pleuritic chest pain, no tachycardia, tachypnea, hemoptysis,   Considered pneumothorax, no clinical nor radiographic  evidence.  Considered pneumonia, no clinical or radiographic evidence.  No evidence of dysrhythmia on EKG, nor prolonged QTc  No evidence of significant hyponatremia or electrolyte disturbance  No evidence of significant anemia     Considered ACS, given patient's history, negative HS troponin x2 makes this much less likely, EKG without evidence of ischemia.    Given patient's presyncope and chest pain that resolved with nitroglycerin with a history of CAD, I offered the patient observation admission which the patient declined - he is now asymptomatic and feels comfortable returning home with scheduled follow up with Cardiology in January.  The patient explains that he was told that he now likely has angina and was instructed on how to use nitroglycerin on his previous admission.  I discussed with the patient family that if the patient prefers to return home today, he should return immediately to the emergency department if his chest pain recurs, his he has another episode refills lightheaded and dizzy, shortness of breath, any fainting episodes or any other concerns.    Patient has an upcoming                   Clinical Impression:  Final diagnoses:  [R07.9] Chest pain  [R07.9] Chest pain, unspecified type (Primary)          ED Disposition Condition    Discharge Stable          ED Prescriptions    None       Follow-up Information       Follow up With Specialties Details Why Contact Info    [facility] - Emergency Dept Emergency Medicine  As needed, If symptoms worsen [address]  [facility] [id number] [phone]    Deshaun Goel MD Internal Medicine   [address] [facility] [address]  [phone]      [facility] CARDIOLOGY Cardiology  You have an appointment scheduled with Dr. Ramesh at the 3rd floor cardiology clinic at [facility] on 1/23/2025 at 10:00 a.m. [address]  [facility] [address]  [phone]             Kathleen Viramontes MD  12/31/24 1023

## 2025-01-27 ENCOUNTER — TELEPHONE (OUTPATIENT)
Dept: FAMILY MEDICINE | Facility: CLINIC | Age: OVER 89
End: 2025-01-27
Payer: MEDICARE

## 2025-01-27 NOTE — TELEPHONE ENCOUNTER
----- Message from Mallroy sent at 1/25/2025  8:51 AM CST -----  Regarding: Aleks  Who called:Aleks    What is the request in detail: Patient states he can't sleep at night he went to the er for chest pains about a week ago and was prescribed isosorbide mononitrate (IMDUR) 30 MG 24 hr tablet and he is not able to sleep while taking it please reach out to the patient    Can the clinic reply by [facility]?No    Would the patient rather a call back or a response via [facility]?Callback    Best call back number:.[phone]      Additional Information:

## 2025-01-31 ENCOUNTER — TELEPHONE (OUTPATIENT)
Dept: CARDIOLOGY | Facility: CLINIC | Age: OVER 89
End: 2025-01-31
Payer: MEDICARE

## 2025-01-31 DIAGNOSIS — I10 ESSENTIAL HYPERTENSION: Primary | ICD-10-CM

## 2025-01-31 DIAGNOSIS — I25.10 CORONARY ARTERY DISEASE INVOLVING NATIVE HEART, UNSPECIFIED VESSEL OR LESION TYPE, UNSPECIFIED WHETHER ANGINA PRESENT: ICD-10-CM
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
## 2025-01-31 NOTE — TELEPHONE ENCOUNTER
Pt scheduled on 2/6 w. dr Raza + note for EKG needed, appt w. dr Ramesh cancelled. Son agreed to date/time of appointment(s). Told son to arrive 30 mn early and he/she/they verbalized understanding.

## 2025-02-03 PROBLEM — I21.4 NSTEMI (NON-ST ELEVATED MYOCARDIAL INFARCTION): Status: RESOLVED | Noted: 2024-12-12 | Resolved: 2025-02-03

## 2025-02-03 PROBLEM — R79.89 ELEVATED D-DIMER: Status: RESOLVED | Noted: 2024-12-12 | Resolved: 2025-02-03

## 2025-02-03 PROBLEM — I70.0 CALCIFICATION OF AORTA: Status: RESOLVED | Noted: 2024-11-15 | Resolved: 2025-02-03

## 2025-02-03 PROBLEM — I25.700 CORONARY ARTERY DISEASE INVOLVING CORONARY BYPASS GRAFT WITH UNSTABLE ANGINA PECTORIS: Status: RESOLVED | Noted: 2024-12-12 | Resolved: 2025-02-03

## 2025-02-04 ENCOUNTER — OFFICE VISIT (OUTPATIENT)
Dept: FAMILY MEDICINE | Facility: CLINIC | Age: OVER 89
End: 2025-02-04
Payer: MEDICARE

## 2025-02-04 VITALS
BODY MASS INDEX: 27.21 KG/M2 | DIASTOLIC BLOOD PRESSURE: 62 MMHG | SYSTOLIC BLOOD PRESSURE: 130 MMHG | HEART RATE: 61 BPM | HEIGHT: 72 IN | WEIGHT: 200.94 LBS | TEMPERATURE: 98 F | OXYGEN SATURATION: 97 %

## 2025-02-04 DIAGNOSIS — R73.09 ABNORMAL GLUCOSE: ICD-10-CM

## 2025-02-04 DIAGNOSIS — D69.2 SENILE PURPURA: ICD-10-CM

## 2025-02-04 DIAGNOSIS — I70.0 CALCIFICATION OF AORTA: ICD-10-CM

## 2025-02-04 DIAGNOSIS — N18.31 CHRONIC KIDNEY DISEASE, STAGE 3A: ICD-10-CM

## 2025-02-04 DIAGNOSIS — I10 ESSENTIAL HYPERTENSION: ICD-10-CM

## 2025-02-04 DIAGNOSIS — I25.110 CORONARY ARTERY DISEASE INVOLVING NATIVE CORONARY ARTERY OF NATIVE HEART WITH UNSTABLE ANGINA PECTORIS: Primary | ICD-10-CM

## 2025-02-04 DIAGNOSIS — E78.5 DYSLIPIDEMIA: ICD-10-CM

## 2025-02-04 PROBLEM — I25.10 CAD (CORONARY ARTERY DISEASE): Status: RESOLVED | Noted: 2024-12-12 | Resolved: 2025-02-04

## 2025-02-04 PROCEDURE — 99214 OFFICE O/P EST MOD 30 MIN: CPT | Mod: PBBFAC,PO | Performed by: INTERNAL MEDICINE

## 2025-02-04 PROCEDURE — 99999 PR PBB SHADOW E&M-EST. PATIENT-LVL IV: CPT | Mod: PBBFAC,,, | Performed by: INTERNAL MEDICINE

## 2025-02-04 PROCEDURE — 99214 OFFICE O/P EST MOD 30 MIN: CPT | Mod: S$PBB,,, | Performed by: INTERNAL MEDICINE

## 2025-02-04 PROCEDURE — G2211 COMPLEX E/M VISIT ADD ON: HCPCS | Mod: S$PBB,,, | Performed by: INTERNAL MEDICINE

## 2025-02-04 RX ORDER — NITROGLYCERIN 0.4 MG/1
0.4 TABLET SUBLINGUAL EVERY 5 MIN PRN
Qty: 30 TABLET | Refills: 5 | Status: SHIPPED | OUTPATIENT
Start: 2025-02-04

## 2025-02-04 RX ORDER — ISOSORBIDE MONONITRATE 30 MG/1
30 TABLET, EXTENDED RELEASE ORAL DAILY
Qty: 90 TABLET | Refills: 3 | Status: SHIPPED | OUTPATIENT
Start: 2025-02-04 | End: 2026-02-04
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
## 2025-02-06 ENCOUNTER — OFFICE VISIT (OUTPATIENT)
Dept: CARDIOLOGY | Facility: CLINIC | Age: OVER 89
End: 2025-02-06
Payer: MEDICARE

## 2025-02-06 VITALS
DIASTOLIC BLOOD PRESSURE: 50 MMHG | WEIGHT: 199.31 LBS | OXYGEN SATURATION: 97 % | SYSTOLIC BLOOD PRESSURE: 102 MMHG | HEIGHT: 72 IN | HEART RATE: 63 BPM | BODY MASS INDEX: 27 KG/M2

## 2025-02-06 DIAGNOSIS — R07.9 CHEST PAIN: ICD-10-CM

## 2025-02-06 DIAGNOSIS — E78.5 DYSLIPIDEMIA: ICD-10-CM

## 2025-02-06 DIAGNOSIS — I21.4 NSTEMI (NON-ST ELEVATION MYOCARDIAL INFARCTION): ICD-10-CM

## 2025-02-06 DIAGNOSIS — I20.89 STABLE ANGINA PECTORIS: Primary | ICD-10-CM

## 2025-02-06 PROBLEM — R06.09 DOE (DYSPNEA ON EXERTION): Status: RESOLVED | Noted: 2020-06-15 | Resolved: 2025-02-06

## 2025-02-06 PROCEDURE — 99999 PR PBB SHADOW E&M-EST. PATIENT-LVL IV: CPT | Mod: PBBFAC,,, | Performed by: STUDENT IN AN ORGANIZED HEALTH CARE EDUCATION/TRAINING PROGRAM

## 2025-02-06 PROCEDURE — 99214 OFFICE O/P EST MOD 30 MIN: CPT | Mod: PBBFAC | Performed by: STUDENT IN AN ORGANIZED HEALTH CARE EDUCATION/TRAINING PROGRAM

## 2025-02-06 PROCEDURE — 99214 OFFICE O/P EST MOD 30 MIN: CPT | Mod: S$PBB,,, | Performed by: STUDENT IN AN ORGANIZED HEALTH CARE EDUCATION/TRAINING PROGRAM

## 2025-02-06 NOTE — PROGRESS NOTES
PCP - Deshaun Goel MD  Referring Physician:     Subjective:   Patient ID:  Aleks Brown is a 89 y.o. male with past medical history of:   CAD/CABG x 5 1988([facility] 2011 with 4/5 grafts patent), HTN, HLD, COPD, carotid disease    Patient here for follow-up.  To recent ER visits in December for chest pain.  Troponins were negative on the 2nd visit in only mildly positive on the 1st visit.  He was placed on Imdur and has not had any chest pain since.  He is not very active due to his knee problems.  Denies having any chest pain, shortness of breath, palpitations today.  Accompanied to the visit today by his son.    EKG from clinic today shows sinus bradycardia    History:     Social History     Tobacco Use    Smoking status: Former    Smokeless tobacco: Never   Substance Use Topics    Alcohol use: Yes     Comment: occasionally     Family History   Problem Relation Name Age of Onset    Skin cancer Mother      Leukemia Father      Diabetes Sister      Peripheral vascular disease Sister      Cancer Brother      No Known Problems Daughter      No Known Problems Daughter      No Known Problems Daughter      No Known Problems Daughter      No Known Problems Son      No Known Problems Son      Melanoma Neg Hx         Meds:     Review of patient's allergies indicates:   Allergen Reactions    Iodine and iodide containing products Anaphylaxis    Dye Other (See Comments)    Iodinated contrast media     Trazodone      dizziness    Adhesive Other (See Comments)     rash    Keflex [cephalexin] Diarrhea and Nausea And Vomiting    Penicillins Rash    Tylenol [acetaminophen] Other (See Comments)     Triggers PTSD/nightmares       Current Outpatient Medications:     aspirin (ECOTRIN) 81 MG EC tablet, Take 81 mg by mouth once daily. , Disp: , Rfl:     atorvastatin (LIPITOR) 40 MG tablet, Take 1 tablet (40 mg total) by mouth once daily., Disp: 90 tablet, Rfl: 3    cholecalciferol, vitamin D3, (VITAMIN D3) 50 mcg (2,000 unit)  Tab, Take 1 tablet by mouth once daily., Disp: , Rfl:     clobetasoL (TEMOVATE) 0.05 % external solution, Pt to mix in 1 jar of cerave cream and apply to affected areas bid, Disp: 50 mL, Rfl: 3    clotrimazole-betamethasone 1-0.05% (LOTRISONE) cream, Apply topically 2 (two) times daily., Disp: 45 g, Rfl: 0    MULTIVITAMIN ORAL, Take 1 tablet by mouth once daily., Disp: , Rfl:     nitroGLYCERIN (NITROSTAT) 0.4 MG SL tablet, Place 1 tablet (0.4 mg total) under the tongue every 5 (five) minutes as needed for Chest pain., Disp: 30 tablet, Rfl: 5    isosorbide mononitrate (IMDUR) 30 MG 24 hr tablet, Take 1 tablet (30 mg total) by mouth once daily., Disp: 90 tablet, Rfl: 3    lisinopriL 10 MG tablet, Take 1 tablet (10 mg total) by mouth once daily., Disp: 90 tablet, Rfl: 3    metoprolol succinate (TOPROL-XL) 100 MG 24 hr tablet, Take 1 tablet (100 mg total) by mouth once daily., Disp: 90 tablet, Rfl: 3      Objective:   BP (!) 102/50 (BP Location: Left arm, Patient Position: Sitting)   Pulse 63   Ht 6' (1.829 m)   Wt 90.4 kg (199 lb 4.7 oz)   SpO2 97%   BMI 27.03 kg/m²     Physical Exam  Gen: No apparent distress, resting comfortably  HEENT: Pupils equal and reactive to light  Cardio: Regular rate, point of maximal impulse not displaced, no murmur noted, 2+ radial pulses bilaterally, 2+ DP pulses bilaterally  Resp: CTAB, no wheezing  Abd: Soft, non-tender, non-distended  Skin: Warm, dry, no peripheral edema noted  Neuro: Alert and oriented x3  Psych: Normal mood and affect      Labs:     Lab Results   Component Value Date     12/27/2024    K 4.3 12/27/2024     12/27/2024    CO2 20 (L) 12/27/2024    BUN 30 12/27/2024    CREATININE 1.1 12/27/2024    ANIONGAP 11 12/27/2024     Lab Results   Component Value Date    HGBA1C 5.6 12/12/2024     Lab Results   Component Value Date    BNP 69 12/27/2024    BNP 91 12/11/2024    BNP 72 02/12/2011       Lab Results   Component Value Date    WBC 7.83 12/27/2024    HGB  10.5 (L) 12/27/2024    HCT 31.2 (L) 12/27/2024     12/27/2024    GRAN 4.9 12/27/2024    GRAN 62.5 12/27/2024     Lab Results   Component Value Date    CHOL 125 12/12/2024    HDL 40 12/12/2024    LDLCALC 72.8 12/12/2024    TRIG 61 12/12/2024       Lab Results   Component Value Date     12/27/2024    K 4.3 12/27/2024     12/27/2024    CO2 20 (L) 12/27/2024    BUN 30 12/27/2024    CREATININE 1.1 12/27/2024    ANIONGAP 11 12/27/2024     Lab Results   Component Value Date    HGBA1C 5.6 12/12/2024     Lab Results   Component Value Date    BNP 69 12/27/2024    BNP 91 12/11/2024    BNP 72 02/12/2011    Lab Results   Component Value Date    WBC 7.83 12/27/2024    HGB 10.5 (L) 12/27/2024    HCT 31.2 (L) 12/27/2024     12/27/2024    GRAN 4.9 12/27/2024    GRAN 62.5 12/27/2024     Lab Results   Component Value Date    CHOL 125 12/12/2024    HDL 40 12/12/2024    LDLCALC 72.8 12/12/2024    TRIG 61 12/12/2024                Cardiovascular Imaging:     Echo 12/12/24:    Left Ventricle: The left ventricle is normal in size. Normal wall thickness. There is normal systolic function with a visually estimated ejection fraction of 60 - 65%. Diastolic function cannot be reliably determined in the presence of mitral annular calcification.    Right Ventricle: Normal right ventricular cavity size. Wall thickness is normal. Systolic function is normal.    The left atrium is moderately dilated.    Aortic Valve: The aortic valve is a trileaflet valve. There is moderate aortic valve sclerosis. Mildly restricted motion.    Mitral Valve: There is moderate mitral annular calcification present.    Pulmonary Artery: The estimated pulmonary artery systolic pressure is 16 mmHg.    IVC/SVC: Normal venous pressure at 3 mmHg.     Stress test:     C:    Assessment & Plan:       Stable angina pectoris  CAD s/p CABG  Asymptomatic today  Continue anti-ischemic GDMT with metoprolol and Imdur  Continue aspirin 81 mg qd and  atorvastatin    Dyslipidemia  Continue high intensity statin    RTC in 6 months    Signed:  Massimo Raza MD  [facility]

## 2025-02-07 ENCOUNTER — TELEPHONE (OUTPATIENT)
Dept: CARDIOLOGY | Facility: CLINIC | Age: OVER 89
End: 2025-02-07
Payer: MEDICARE

## 2025-02-07 RX ORDER — LANOLIN ALCOHOL/MO/W.PET/CERES
1 CREAM (GRAM) TOPICAL
COMMUNITY

## 2025-02-07 RX ORDER — LANOLIN ALCOHOL/MO/W.PET/CERES
5000 CREAM (GRAM) TOPICAL DAILY
COMMUNITY

## 2025-02-07 NOTE — TELEPHONE ENCOUNTER
Patient called to confirm his medications.  Prescription meds with correct name and dose.  Two supplements added.

## 2025-05-29 ENCOUNTER — PATIENT OUTREACH (OUTPATIENT)
Dept: ADMINISTRATIVE | Facility: HOSPITAL | Age: OVER 89
End: 2025-05-29
Payer: MEDICARE

## 2025-05-29 DIAGNOSIS — L30.9 DERMATITIS: ICD-10-CM

## 2025-05-29 DIAGNOSIS — L30.8 ASTEATOTIC ECZEMA: ICD-10-CM

## 2025-05-29 RX ORDER — CLOBETASOL PROPIONATE 0.5 MG/ML
SOLUTION TOPICAL
Qty: 50 ML | Refills: 3 | Status: SHIPPED | OUTPATIENT
Start: 2025-05-29

## 2025-05-30 RX ORDER — CLOTRIMAZOLE AND BETAMETHASONE DIPROPIONATE 10; .64 MG/G; MG/G
CREAM TOPICAL 2 TIMES DAILY
Qty: 45 G | Refills: 0 | Status: SHIPPED | OUTPATIENT
Start: 2025-05-30

## 2025-05-30 NOTE — TELEPHONE ENCOUNTER
Refill Routing Note   Medication(s) are not appropriate for processing by [facility] for the following reason(s):        Outside of protocol    [facility] action(s):  Route               Appointments  past 12m or future 3m with PCP    Date Provider   Last Visit   2/4/2025 Deshaun Goel MD   Next Visit   6/10/2025 Deshaun Goel MD   ED visits in past 90 days: 0        Note composed:7:41 AM 05/30/2025

## 2025-06-04 ENCOUNTER — LAB VISIT (OUTPATIENT)
Dept: LAB | Facility: HOSPITAL | Age: OVER 89
End: 2025-06-04
Attending: INTERNAL MEDICINE
Payer: MEDICARE

## 2025-06-04 DIAGNOSIS — R73.09 ABNORMAL GLUCOSE: ICD-10-CM

## 2025-06-04 DIAGNOSIS — N18.31 CHRONIC KIDNEY DISEASE, STAGE 3A: ICD-10-CM

## 2025-06-04 DIAGNOSIS — E78.2 MIXED HYPERLIPIDEMIA: ICD-10-CM

## 2025-06-04 LAB
ALBUMIN SERPL BCP-MCNC: 3.7 G/DL (ref 3.5–5.2)
ALP SERPL-CCNC: 81 UNIT/L (ref 40–150)
ALT SERPL W/O P-5'-P-CCNC: 17 UNIT/L (ref 10–44)
ANION GAP (OHS): 5 MMOL/L (ref 8–16)
AST SERPL-CCNC: 25 UNIT/L (ref 11–45)
BILIRUB SERPL-MCNC: 0.5 MG/DL (ref 0.1–1)
BUN SERPL-MCNC: 29 MG/DL (ref 10–30)
CALCIUM SERPL-MCNC: 9.5 MG/DL (ref 8.7–10.5)
CHLORIDE SERPL-SCNC: 109 MMOL/L (ref 95–110)
CHOLEST SERPL-MCNC: 105 MG/DL (ref 120–199)
CHOLEST/HDLC SERPL: 2.6 {RATIO} (ref 2–5)
CO2 SERPL-SCNC: 28 MMOL/L (ref 23–29)
CREAT SERPL-MCNC: 1.1 MG/DL (ref 0.5–1.4)
EAG (OHS): 117 MG/DL (ref 68–131)
ERYTHROCYTE [DISTWIDTH] IN BLOOD BY AUTOMATED COUNT: 12.3 % (ref 11.5–14.5)
GFR SERPLBLD CREATININE-BSD FMLA CKD-EPI: >60 ML/MIN/1.73/M2
GLUCOSE SERPL-MCNC: 100 MG/DL (ref 70–110)
HBA1C MFR BLD: 5.7 % (ref 4–5.6)
HCT VFR BLD AUTO: 37.2 % (ref 40–54)
HDLC SERPL-MCNC: 41 MG/DL (ref 40–75)
HDLC SERPL: 39 % (ref 20–50)
HGB BLD-MCNC: 11.7 GM/DL (ref 14–18)
LDLC SERPL CALC-MCNC: 48.4 MG/DL (ref 63–159)
MCH RBC QN AUTO: 33.1 PG (ref 27–31)
MCHC RBC AUTO-ENTMCNC: 31.5 G/DL (ref 32–36)
MCV RBC AUTO: 105 FL (ref 82–98)
NONHDLC SERPL-MCNC: 64 MG/DL
PLATELET # BLD AUTO: 243 K/UL (ref 150–450)
PMV BLD AUTO: 13.8 FL (ref 9.2–12.9)
POTASSIUM SERPL-SCNC: 4.8 MMOL/L (ref 3.5–5.1)
PROT SERPL-MCNC: 6.7 GM/DL (ref 6–8.4)
RBC # BLD AUTO: 3.54 M/UL (ref 4.6–6.2)
SODIUM SERPL-SCNC: 142 MMOL/L (ref 136–145)
TRIGL SERPL-MCNC: 78 MG/DL (ref 30–150)
WBC # BLD AUTO: 8.33 K/UL (ref 3.9–12.7)

## 2025-06-04 PROCEDURE — 85027 COMPLETE CBC AUTOMATED: CPT

## 2025-06-04 PROCEDURE — 83036 HEMOGLOBIN GLYCOSYLATED A1C: CPT

## 2025-06-04 PROCEDURE — 36415 COLL VENOUS BLD VENIPUNCTURE: CPT | Mod: PO

## 2025-06-04 PROCEDURE — 80061 LIPID PANEL: CPT

## 2025-06-04 PROCEDURE — 80053 COMPREHEN METABOLIC PANEL: CPT

## 2025-06-09 NOTE — ASSESSMENT & PLAN NOTE
Lipid done pre visit good on statin. No changes. Check next June  Orders:    Comprehensive Metabolic Panel; Future    CBC Without Differential; Future    Comprehensive Metabolic Panel; Future    Lipid Panel; Future     Pt here for BP check. See vitals. Info sent to Dr Young for review.

## 2025-06-09 NOTE — ASSESSMENT & PLAN NOTE
Pre visit labs reviewed, CKD 2 vs 3a.  Continue to monitor.  Check ferritin with next labs.  CMP 6 months.  Orders:    Comprehensive Metabolic Panel; Future    CBC Without Differential; Future    Comprehensive Metabolic Panel; Future    Ferritin; Future

## 2025-06-09 NOTE — PROGRESS NOTES
This note was created by combination of typed  and M-Modal dictation.  Transcription errors may be present.   This note was also generated with the assistance of ambient listening technology. Verbal consent was obtained by the patient and accompanying visitor(s) for the recording of patient appointment to facilitate this note. I attest to having reviewed and edited the generated note for accuracy, though some syntax or spelling errors may persist. Please contact the author of this note for any clarification.    Assessment and Plan:   Assessment and Plan   Assessment & Plan  Essential hypertension  Chronic kidney disease, stage 3a  Pre visit labs reviewed, CKD 2 vs 3a.  Continue to monitor.  Check ferritin with next labs.  CMP 6 months.  Orders:    Comprehensive Metabolic Panel; Future    CBC Without Differential; Future    Comprehensive Metabolic Panel; Future    Ferritin; Future    Dyslipidemia  Bilateral carotid artery stenosis  Lipid done pre visit good on statin. No changes. Check next June  Orders:    Comprehensive Metabolic Panel; Future    CBC Without Differential; Future    Comprehensive Metabolic Panel; Future    Lipid Panel; Future    Abnormal glucose  A1c has been stable. Diet controlled  Orders:    Hemoglobin A1C; Future    Obstructive sleep apnea 7/18/22 HST with mod NATTY AHI 20; intolerant of CPAP  Intolerant of CPAP historically.       Insomnia, unspecified type  History of trazodone without efficacy         There are no discontinued medications.    meds sent this encounter:         Follow Up: OV 6 months with CMP, ferritin  Future Appointments   Date Time Provider Department Center   6/10/2025 11:00 AM Deshaun Goel MD [facility]          Subjective:   Subjective   Chief Complaint   Patient presents with    Hypertension     6 months follow up    Hearing Problem       FELICIA Fink is a 89 y.o. male.     Social History     Socioeconomic History    Marital status:     Occupational History    Occupation: 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  .  Six kids. [place] [employer] vet.  [employer] [facility] [place].      Tobacco Use    Smoking status: Former    Smokeless tobacco: Never   Substance and Sexual Activity    Alcohol use: Yes     Comment: occasionally    Drug use: No    Sexual activity: Not Currently     Partners: Female   Social History Narrative     x 60 yr.  [employer].       Social Drivers of Health     Financial Resource Strain: Low Risk  (11/15/2024)    Overall Financial Resource Strain (CARDIA)     Difficulty of Paying Living Expenses: Not hard at all   Food Insecurity: No Food Insecurity (11/15/2024)    Hunger Vital Sign     Worried About Running Out of Food in the Last Year: Never true     Ran Out of Food in the Last Year: Never true   Transportation Needs: No Transportation Needs (11/15/2024)    PRAPARE - Transportation     Lack of Transportation (Medical): No     Lack of Transportation (Non-Medical): No   Physical Activity: Insufficiently Active (11/15/2024)    Exercise Vital Sign     Days of Exercise per Week: 1 day     Minutes of Exercise per Session: 10 min   Stress: No Stress Concern Present (11/15/2024)    [employer] [place] [employer] - Occupational Stress Questionnaire     Feeling of Stress : Only a little   Housing Stability: Unknown (11/15/2024)    Housing Stability Vital Sign     Unable to Pay for Housing in the Last Year: No     Homeless in the Last Year: No       Last appointment with this clinic was 2/4/2025. Last visit with me 2/4/2025   To summarize last visit and events leading up to today:  Hypertension, CKD stage IIIA  Hyperlipidemia with unstable angina  abnormal glucose  Macrocytic anemia with history of alcohol use.  by self report, stopped alcohol/cut down  NATTY intolerant of CPAP  Eczema  area.  Lotrisone.    History of Benzodiazepine dependence.  Stable off of lorazepam  Frostbite with neuropathy.  [place] .    ED 1/9/24 c/o  dizziness  CT head negative.  AK and SK, skin lesion of the ear removed, BCC     Last visit with me 12/05/2024.  Chronic constipation, he declined further workup including imaging and colonoscopy.  Recommended stool softeners and if no improvement MiraLax   Hypertension CKD 3A stable  Abnormal glucose check future A1c   Coronary artery disease check future labs on statin     12/11/2024 ED for chest pain.  Troponin slight bump.  Cardiology saw patient, this is not type 1 NSTEMI.  Lower extremity ultrasound negative for DVT.  Presence of left Baker's cyst  V/Q scan low probability for PE  Discharged on isosorbide     12/27/2024 ED for chest pain after bending forward and then standing up subsequently feeling lightheaded with chest pain  BNP normal.  Troponins negative.  No changes in regimen     Cardiology follow-up 02/06/2025    Last visit with me 02/04/2025   Coronary disease recent hospitalization for unstable angina discharged on new isosorbide.  Side effect of mild headache.  No chest pain since.  Upcoming follow up with Cardiology.  Is wary of proceeding with left heart catheterization because of history IV contrast reaction  Hypertension CKD 3A stable  Abnormal glucose check future surveillance A1c    02/06/2025 saw cardiology.  Asymptomatic.  No changes in regimen follow-up 6 months    Pre visit labs   CBC mild anemia stable  CMP creatinine 1.1 CKD stage 2   Lipid good   A1c 5.7    ASA  Lipitor 40  Vit D 2000  Clotrimazole  Desonide  Isosorbide 30  Lisinopril 10  Toprol xl 100  Trazodone 25    Today's visit:    History of Present Illness    SOCIAL HISTORY:  - Occupation: Retired     History: Wounded in the [place] War  Marital status:  for 72 years    HPI:  Aleks reports generally feeling well, with mixed statements about his health. He mentions eating well but has longstanding sleep issues. He has difficulty falling back asleep after waking up to use the bathroom around midnight or 1 AM. He is  a lifelong short sleeper, getting only 4-5 hours of sleep due to an active mind. [facility], but he rarely takes them due to paradoxical effects, staying awake all night after taking 1 or 2 pills. He has occasional leg pain, which he describes as tolerable and chooses not to treat with cortisone or other medications. He remains active, doing household chores like cooking and cleaning, and spends significant time on computer-based activities such as genealogy research. He reports no chest pain since his last cardiology evaluation, where he was prescribed isosorbide to help expand his blood vessels. He takes this pill every morning and has not had chest pain since starting the medication.    He denies chest pain and any new pains except occasional leg pain.    MEDICATIONS:  - atorvastatin  - Isosorbide, taken every morning  Toprol-  Lisinopril 10  - Vitamins, multiple pills taken in the morning  - Discontinued sleeping pills trazodone due to ineffectiveness and possible opposite effect    ROS:  General: reports sleep disturbances, reports difficulty staying asleep  Cardiovascular: no chest pain  Genitourinary: reports nocturia  Musculoskeletal: reports limb pain, reports back pain         Patient Care Team:  Deshaun Goel MD as PCP - General (Internal Medicine)  Magnus Rashid MD as Consulting Physician (Dermatology)  Gillian Williamson MD as Consulting Physician (Dermatology)  Cuba Centeno PA-C (Internal Medicine)  [facility]    Patient Active Problem List    Diagnosis Date Noted    Benign essential HTN 12/12/2024    GERD (gastroesophageal reflux disease) 12/12/2024    Senile purpura 11/15/2024    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 [facility] 10/23/2017    Essential hypertension 10/29/2015     12/11/24 TTE LVEF 60-65%. Can't determine diastolic function. Mild AoS      Insomnia hx trazodone ineffective and with SE 08/01/2014     10/2017 trazodone ineffective and SE of dizziness      Bilateral carotid artery stenosis 11/06/2012     There is 60 - 69% right Internal Carotid stenosis.                           There is 40 - 49% left Internal Carotid stenosis.   July 2011      Dyslipidemia 11/06/2012    Stable angina pectoris 11/06/2012       PAST MEDICAL PROBLEMS, PAST SURGICAL HISTORY: please see relevant portions of the electronic medical record    ALLERGIES AND MEDICATIONS: updated and reviewed.  Medication List with Changes/Refills   Current Medications    ASPIRIN (ECOTRIN) 81 MG EC TABLET    Take 81 mg by mouth once daily.     ATORVASTATIN (LIPITOR) 40 MG TABLET    Take 1 tablet (40 mg total) by mouth once daily.    CHOLECALCIFEROL, VITAMIN D3, (VITAMIN D3) 50 MCG (2,000 UNIT) TAB    Take 1 tablet by mouth once daily.    CLOBETASOL (TEMOVATE) 0.05 % EXTERNAL SOLUTION    MIX IN 1 JAR OF CERAVE CREAM AND APPLY TO AFFECTED AREAS TWICE DAILY    CLOTRIMAZOLE-BETAMETHASONE 1-0.05% (LOTRISONE) CREAM    APPLY TOPICALLY TWICE A DAY TO AFFECTED AREA    CYANOCOBALAMIN (VITAMIN B-12) 1000 MCG TABLET    Take 5,000 mcg by mouth once daily.    FERROUS SULFATE (FEOSOL) TAB TABLET    Take 1 tablet by mouth daily with breakfast.    ISOSORBIDE MONONITRATE (IMDUR) 30 MG 24 HR TABLET    Take 1 tablet (30 mg total) by mouth once daily.     LISINOPRIL 10 MG TABLET    Take 1 tablet (10 mg total) by mouth once daily.    METOPROLOL SUCCINATE (TOPROL-XL) 100 MG 24 HR TABLET    Take 1 tablet (100 mg total) by mouth once daily.    MULTIVITAMIN ORAL    Take 1 tablet by mouth once daily. 50+    NITROGLYCERIN (NITROSTAT) 0.4 MG SL TABLET    Place 1 tablet (0.4 mg total) under the tongue every 5 (five) minutes as needed for Chest pain.         Objective:   Objective   Physical Exam   Vitals:    06/10/25 1051   BP: 112/68   Pulse: (!) 55   Temp: 98.7 °F (37.1 °C)   TempSrc: Oral   SpO2: 95%   Weight: 91.2 kg (200 lb 15.2 oz)   Height: 6' (1.829 m)    Body mass index is 27.25 kg/m².            Physical Exam  Constitutional:       General: He is not in acute distress.     Appearance: He is well-developed.   Eyes:      Extraocular Movements: Extraocular movements intact.   Cardiovascular:      Rate and Rhythm: Normal rate and regular rhythm.      Heart sounds: Normal heart sounds. No murmur heard.  Pulmonary:      Effort: Pulmonary effort is normal.      Breath sounds: Normal breath sounds.   Musculoskeletal:         General: Normal range of motion.   Skin:     General: Skin is warm and dry.   Neurological:      Mental Status: He is alert and oriented to person, place, and time.      Coordination: Coordination normal.   Psychiatric:         Behavior: Behavior normal.         Thought Content: Thought content normal.         Component      Latest Ref Rng 2/8/2024 12/12/2024 6/4/2025   Sodium      136 - 145 mmol/L 140   142    Potassium      3.5 - 5.1 mmol/L 4.9   4.8    Chloride      95 - 110 mmol/L 107   109    CO2      23 - 29 mmol/L 26   28    Glucose      70 - 110 mg/dL 105   100    BUN      10 - 30 mg/dL 21   29    Creatinine      0.5 - 1.4 mg/dL 1.2   1.1    Calcium      8.7 - 10.5 mg/dL 10.0   9.5    PROTEIN TOTAL      6.0 - 8.4 gm/dL 6.7   6.7    Albumin      3.5 - 5.2 g/dL 3.8   3.7    BILIRUBIN TOTAL      0.1 - 1.0 mg/dL 0.7   0.5    ALP      40 - 150 unit/L  83   81    AST      11 - 45 unit/L 27   25    ALT      10 - 44 unit/L 19   17    eGFR      >60 mL/min/1.73/m2 57.1 !   >60    Anion Gap      8 - 16 mmol/L 7 (L)   5 (L)    WBC      3.90 - 12.70 K/uL 7.79   8.33    RBC      4.60 - 6.20 M/uL 3.76 (L)   3.54 (L)    Hemoglobin      14.0 - 18.0 gm/dL 12.7 (L)   11.7 (L)    Hematocrit      40.0 - 54.0 % 39.5 (L)   37.2 (L)    MCV      82 - 98 fL 105 (H)   105 (H)    MCH      27.0 - 31.0 pg 33.8 (H)   33.1 (H)    MCHC      32.0 - 36.0 g/dL 32.2   31.5 (L)    RDW      11.5 - 14.5 % 12.3   12.3    Platelet Count      150 - 450 K/uL 214   243    MPV      9.2 - 12.9 fL 14.1 (H)   13.8 (H)    Cholesterol Total      120 - 199 mg/dL 122  125  105 (L)    Triglycerides      30 - 150 mg/dL 117  61  78    HDL      40 - 75 mg/dL 43  40  41    LDL Cholesterol      63.0 - 159.0 mg/dL 55.6 (L)  72.8  48.4 (L)    HDL/Cholesterol Ratio      20.0 - 50.0 % 35.2  32.0  39.0    Total Cholesterol/HDL Ratio      2.0 - 5.0  2.8  3.1  2.6    Non-HDL Cholesterol      mg/dL 79  85  64    Hemoglobin A1C External      4.0 - 5.6 % 5.7 (H)  5.6  5.7 (H)    Estimated Avg Glucose      68 - 131 mg/dL 117  114  117       Legend:  ! Abnormal  (L) Low  (H) High

## 2025-06-09 NOTE — ASSESSMENT & PLAN NOTE
Lipid done pre visit good on statin. No changes. Check next June  Orders:    Comprehensive Metabolic Panel; Future    CBC Without Differential; Future    Comprehensive Metabolic Panel; Future    Lipid Panel; Future

## 2025-06-10 ENCOUNTER — OFFICE VISIT (OUTPATIENT)
Dept: FAMILY MEDICINE | Facility: CLINIC | Age: OVER 89
End: 2025-06-10
Payer: MEDICARE

## 2025-06-10 VITALS
WEIGHT: 200.94 LBS | SYSTOLIC BLOOD PRESSURE: 112 MMHG | OXYGEN SATURATION: 95 % | TEMPERATURE: 99 F | HEART RATE: 55 BPM | DIASTOLIC BLOOD PRESSURE: 68 MMHG | BODY MASS INDEX: 27.21 KG/M2 | HEIGHT: 72 IN

## 2025-06-10 DIAGNOSIS — G47.33 OBSTRUCTIVE SLEEP APNEA: ICD-10-CM

## 2025-06-10 DIAGNOSIS — E78.5 DYSLIPIDEMIA: ICD-10-CM

## 2025-06-10 DIAGNOSIS — I10 ESSENTIAL HYPERTENSION: Primary | ICD-10-CM

## 2025-06-10 DIAGNOSIS — R73.09 ABNORMAL GLUCOSE: ICD-10-CM

## 2025-06-10 DIAGNOSIS — G47.00 INSOMNIA, UNSPECIFIED TYPE: ICD-10-CM

## 2025-06-10 DIAGNOSIS — I65.23 BILATERAL CAROTID ARTERY STENOSIS: ICD-10-CM

## 2025-06-10 DIAGNOSIS — N18.31 CHRONIC KIDNEY DISEASE, STAGE 3A: ICD-10-CM

## 2025-06-10 PROCEDURE — 99999 PR PBB SHADOW E&M-EST. PATIENT-LVL IV: CPT | Mod: PBBFAC,,, | Performed by: INTERNAL MEDICINE

## 2025-06-10 PROCEDURE — G2211 COMPLEX E/M VISIT ADD ON: HCPCS | Mod: ,,, | Performed by: INTERNAL MEDICINE

## 2025-06-10 PROCEDURE — 99214 OFFICE O/P EST MOD 30 MIN: CPT | Mod: S$PBB,,, | Performed by: INTERNAL MEDICINE

## 2025-06-10 PROCEDURE — 99214 OFFICE O/P EST MOD 30 MIN: CPT | Mod: PBBFAC,PO | Performed by: INTERNAL MEDICINE

## 2025-07-25 ENCOUNTER — TELEPHONE (OUTPATIENT)
Dept: DERMATOLOGY | Facility: CLINIC | Age: OVER 89
End: 2025-07-25
Payer: MEDICARE

## 2025-07-25 NOTE — TELEPHONE ENCOUNTER
Copied from CRM #[id number]. Topic: Appointments - Appointment Rescheduling  >> Jul 25, 2025  8:56 AM Sandra wrote:  Type:  Sooner Apoointment Request    Caller is requesting a sooner appointment.  Caller declined first available appointment listed below.  Caller will not accept being placed on the waitlist and is requesting a message be sent to doctor.  Name of Caller: PT   When is the first available appointment?   Symptoms: Routine Annual skin check   Would the patient rather a call back or a response via [facility]? Call back   Best Call Back Number: [phone]  Additional Information:      Called pt and scheduled for annual skin check w/ JAM. Pt accepted appt offered and thanked me for call back.

## 2025-07-28 ENCOUNTER — OFFICE VISIT (OUTPATIENT)
Dept: FAMILY MEDICINE | Facility: CLINIC | Age: OVER 89
End: 2025-07-28
Payer: MEDICARE

## 2025-07-28 VITALS
HEART RATE: 54 BPM | SYSTOLIC BLOOD PRESSURE: 118 MMHG | TEMPERATURE: 98 F | HEIGHT: 72 IN | BODY MASS INDEX: 27.19 KG/M2 | OXYGEN SATURATION: 94 % | DIASTOLIC BLOOD PRESSURE: 60 MMHG | WEIGHT: 200.75 LBS

## 2025-07-28 DIAGNOSIS — I10 ESSENTIAL HYPERTENSION: ICD-10-CM

## 2025-07-28 DIAGNOSIS — N18.31 CHRONIC KIDNEY DISEASE, STAGE 3A: ICD-10-CM

## 2025-07-28 DIAGNOSIS — E78.5 DYSLIPIDEMIA: ICD-10-CM

## 2025-07-28 DIAGNOSIS — S51.811A SKIN TEAR OF RIGHT FOREARM WITHOUT COMPLICATION, INITIAL ENCOUNTER: Primary | ICD-10-CM

## 2025-07-28 DIAGNOSIS — K21.9 GASTROESOPHAGEAL REFLUX DISEASE, UNSPECIFIED WHETHER ESOPHAGITIS PRESENT: ICD-10-CM

## 2025-07-28 PROCEDURE — 99214 OFFICE O/P EST MOD 30 MIN: CPT | Mod: PBBFAC,PO | Performed by: NURSE PRACTITIONER

## 2025-07-28 PROCEDURE — 99999 PR PBB SHADOW E&M-EST. PATIENT-LVL IV: CPT | Mod: PBBFAC,,, | Performed by: NURSE PRACTITIONER

## 2025-07-28 PROCEDURE — 99214 OFFICE O/P EST MOD 30 MIN: CPT | Mod: S$PBB,,, | Performed by: NURSE PRACTITIONER

## 2025-07-28 PROCEDURE — G2211 COMPLEX E/M VISIT ADD ON: HCPCS | Mod: ,,, | Performed by: NURSE PRACTITIONER

## 2025-07-28 NOTE — PATIENT INSTRUCTIONS
Medical Fitness--[phone]  Imaging, Xray, CT, MRI, Ultrasound---[phone]  Bariatrics---[phone]  Breast Surgery---[phone]  Case Management---[phone]  Colonoscopy---[phone]  DME---[phone]  Infectious Disease---[phone]  Interventional Radiology---[phone]  Medical Records---[phone]  [facility]---[phone]  Optometry/Ophthalmology---[phone]  [facility]---[phone]  Physical Therapy---[phone]  Psychiatry---[phone] or [phone]  Plastic Surgery---[phone]  Recovery--[phone] option 2, or [phone].  Sleep Study---[phone]  Smoking Cessation---[phone]  Wound Care---[phone]  Referral Desk---[phone]

## 2025-07-28 NOTE — PROGRESS NOTES
HPI     Aleks rBown is a 89 y.o. male with multiple medical diagnoses as listed in the medical history and problem list that presents for   Chief Complaint   Patient presents with    Abrasion       Wound Check  He was originally treated 3 to 5 days ago. Prior ED Treatment: neosporin. His temperature was unmeasured prior to arrival. There has been no drainage from the wound. The redness has improved. The swelling has improved. The pain has improved. He has no difficulty moving the affected extremity or digit.     Pt reports he accidentally cut his right forearm working outside. Vaccines are UTD. Denies chills, discharge, fever. Wound has stopped bleeding. He has been applying neosporin to site.         Assessment & Plan     1. Skin tear of right forearm without complication, initial encounter  As above.   S/s consistent with dx  Wound cleaned and dressed  Wound care education provided.   Discussed DDx, condition, and treatment.   Education sent to patient portal/included in after visit summary.  ED precautions given.   Notify provider if symptoms do not resolve or increase in severity.   Patient verbalizes understanding and agrees with plan of care.     2. Essential hypertension  BP Readings from Last 3 Encounters:   07/28/25 118/60   06/10/25 112/68   02/06/25 (!) 102/50     -continue current medication regimen  -DASH diet, regular cardiovascular exercises, portion control  -weight loss  -f/u with BP logs in 2 weeks     - Hypertension Digital Medicine (HDMP) Enrollment Order    3. Dyslipidemia  discussed ways to lower triglycerides such as cutting simple sugars out of diet (white breads, candies, cookies, cakes, etc.) and reducing/eliminating intake of highly processed trans fatty acids.   Exercise 30 minutes a day for 4-5 days a week.   Eat more fiber.     4. Chronic kidney disease, stage 3a  Continue sodium restriction, and avoidance of nephrotoxic agents.     5. Gastroesophageal reflux disease,  unspecified whether esophagitis present  -stable, discussed with patient about avoiding potential dietary triggers  -avoid spicy/greasy/sour/acidic foods, as well as tea/coffee/chocolate if possible  -Tylenol as needed for pain, avoid NSAIDs  -Keep food diary           --------------------------------------------      Health Maintenance:  Health Maintenance         Date Due Completion Date    RSV Vaccine (Age 60+ and Pregnant patients) (1 - 1-dose 75+ series) Never done ---    Influenza Vaccine (1) 09/01/2025 10/18/2024    TETANUS VACCINE 09/14/2028 9/14/2018    Lipid Panel 06/04/2030 6/4/2025            Advised patient on the importance of completing overdue health maintenance items    Follow Up:  Follow up in about 2 weeks (around 8/11/2025), or if symptoms worsen or fail to improve.    Exam     Review of Systems:  (as noted above)  Review of Systems   Constitutional:  Negative for fever.   HENT:  Negative for trouble swallowing.    Eyes:  Negative for visual disturbance.   Respiratory:  Negative for chest tightness and shortness of breath.    Cardiovascular:  Negative for chest pain.   Gastrointestinal:  Negative for blood in stool.   Skin:  Positive for wound.       Physical Exam:   Physical Exam  Constitutional:       General: He is not in acute distress.     Appearance: He is not ill-appearing or diaphoretic.   HENT:      Head: Normocephalic and atraumatic.   Eyes:      General: No scleral icterus.  Cardiovascular:      Rate and Rhythm: Regular rhythm. Bradycardia present.   Pulmonary:      Effort: No respiratory distress.   Chest:      Chest wall: No tenderness.   Skin:     Findings: Wound present.          Neurological:      General: No focal deficit present.      Mental Status: He is alert and oriented to person, place, and time.       Vitals:    07/28/25 1018   BP: 118/60   Pulse: (!) 54   Temp: 97.6 °F (36.4 °C)   TempSrc: Oral   SpO2: (!) 94%   Weight: 91 kg (200 lb 11.7 oz)   Height: 6' (1.829 m)       Body mass index is 27.22 kg/m².        History     Past Medical History:  Past Medical History:   Diagnosis Date    Allergy     seasonal    Arthritis     Basal cell carcinoma 10 years    right ear     Basal cell carcinoma 08/24/2017    Left earlobe     Basal cell carcinoma 11/14/2020    Left wrist    Basal cell carcinoma 05/18/2020    left nasal tip    CAD (coronary artery disease) 11/29/2012    Hyperlipidemia     Hypertension     Joint pain     PTSD (post-traumatic stress disorder)     SCC (squamous cell carcinoma) 12/12/2022    L dorsal hand-ED&C    Squamous cell carcinoma 06/26/2017    Squamous cell carcinoma of skin 10/2019    right cheek (cryosurgery)        Past Surgical History:  Past Surgical History:   Procedure Laterality Date    CATARACT EXTRACTION      CORONARY ARTERY BYPASS GRAFT         Social History:  Social History[1]    Family History:  Family History   Problem Relation Name Age of Onset    Skin cancer Mother      Leukemia Father      Diabetes Sister      Peripheral vascular disease Sister      Cancer Brother      No Known Problems Daughter      No Known Problems Daughter      No Known Problems Daughter      No Known Problems Daughter      No Known Problems Son      No Known Problems Son      Melanoma Neg Hx         Allergies and Medications: (updated and reviewed)  Review of patient's allergies indicates:   Allergen Reactions    Iodine and iodide containing products Anaphylaxis    Dye Other (See Comments)    Iodinated contrast media     Trazodone      dizziness    Adhesive Other (See Comments)     rash    Keflex [cephalexin] Diarrhea and Nausea And Vomiting    Penicillins Rash    Tylenol [acetaminophen] Other (See Comments)     Triggers PTSD/nightmares     Current Medications[2]    Patient Care Team:  Deshaun Goel MD as PCP - General (Internal Medicine)  Magnus Rashid MD as Consulting Physician (Dermatology)  Gillian Williamson MD as Consulting Physician (Dermatology)  Jacobo  AD Brink (Internal Medicine)  Clinic, [facility]         - The patient is given an After Visit Summary that lists all medications with directions, allergies, education, orders placed during this encounter and follow-up instructions.      - I have reviewed the patient's medical information including past medical, family, and social history sections including the medications and allergies.      - We discussed the patient's current medications.     This note was created by combination of typed  and MModal dictation.  Transcription errors may be present.  If there are any questions, please contact me.                        [1]   Social History  Socioeconomic History    Marital status:    Occupational History    Occupation: 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  .  Six kids. [place] [employer] vet.  [employer] [facility] [place].      Tobacco Use    Smoking status: Former    Smokeless tobacco: Never   Substance and Sexual Activity    Alcohol use: Yes     Comment: occasionally    Drug use: No    Sexual activity: Not Currently     Partners: Female   Social History Narrative     x 60 yr.  [place] [employer] [facility] [place].       Social Drivers of Health     Financial Resource Strain: Low Risk  (11/15/2024)    Overall Financial Resource Strain (CARDIA)     Difficulty of Paying Living Expenses: Not hard at all   Food Insecurity: No Food Insecurity (11/15/2024)    Hunger Vital Sign     Worried About Running Out of Food in the Last Year: Never true     Ran Out of Food in the Last Year: Never true   Transportation Needs: No Transportation Needs (11/15/2024)    PRAPARE - Transportation     Lack of Transportation (Medical): No     Lack of Transportation (Non-Medical): No   Physical Activity: Insufficiently Active (11/15/2024)    Exercise Vital Sign     Days of Exercise per Week: 1 day     Minutes of Exercise per Session: 10 min   Stress: No Stress Concern Present (11/15/2024)    [employer] -  Occupational Stress Questionnaire     Feeling of Stress : Only a little   Housing Stability: Unknown (11/15/2024)    Housing Stability Vital Sign     Unable to Pay for Housing in the Last Year: No     Homeless in the Last Year: No   [2]   Current Outpatient Medications   Medication Sig Dispense Refill    aspirin (ECOTRIN) 81 MG EC tablet Take 81 mg by mouth once daily.       atorvastatin (LIPITOR) 40 MG tablet Take 1 tablet (40 mg total) by mouth once daily. 90 tablet 3    cholecalciferol, vitamin D3, (VITAMIN D3) 50 mcg (2,000 unit) Tab Take 1 tablet by mouth once daily.      clobetasoL (TEMOVATE) 0.05 % external solution MIX IN 1 JAR OF CERAVE CREAM AND APPLY TO AFFECTED AREAS TWICE DAILY 50 mL 3    clotrimazole-betamethasone 1-0.05% (LOTRISONE) cream APPLY TOPICALLY TWICE A DAY TO AFFECTED AREA 45 g 0    cyanocobalamin (VITAMIN B-12) 1000 MCG tablet Take 5,000 mcg by mouth once daily.      ferrous sulfate (FEOSOL) Tab tablet Take 1 tablet by mouth daily with breakfast.      isosorbide mononitrate (IMDUR) 30 MG 24 hr tablet Take 1 tablet (30 mg total) by mouth once daily. 90 tablet 3    lisinopriL 10 MG tablet Take 1 tablet (10 mg total) by mouth once daily. 90 tablet 3    metoprolol succinate (TOPROL-XL) 100 MG 24 hr tablet Take 1 tablet (100 mg total) by mouth once daily. 90 tablet 3    MULTIVITAMIN ORAL Take 1 tablet by mouth once daily. 50+      nitroGLYCERIN (NITROSTAT) 0.4 MG SL tablet Place 1 tablet (0.4 mg total) under the tongue every 5 (five) minutes as needed for Chest pain. 30 tablet 5     No current facility-administered medications for this visit.

## 2025-08-02 ENCOUNTER — HOSPITAL ENCOUNTER (EMERGENCY)
Facility: HOSPITAL | Age: OVER 89
Discharge: HOME | End: 2025-08-03
Attending: EMERGENCY MEDICINE
Payer: MEDICARE

## 2025-08-02 DIAGNOSIS — S00.03XA CONTUSION OF SCALP, INITIAL ENCOUNTER: Primary | ICD-10-CM
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
## 2025-08-02 PROCEDURE — 99285 EMERGENCY DEPT VISIT HI MDM: CPT | Mod: 25,ER

## 2025-08-02 PROCEDURE — 93010 ELECTROCARDIOGRAM REPORT: CPT | Mod: ,,, | Performed by: INTERNAL MEDICINE

## 2025-08-02 PROCEDURE — 93005 ELECTROCARDIOGRAM TRACING: CPT | Mod: ER

## 2025-08-03 VITALS
BODY MASS INDEX: 26.68 KG/M2 | WEIGHT: 197 LBS | HEIGHT: 72 IN | DIASTOLIC BLOOD PRESSURE: 61 MMHG | HEART RATE: 82 BPM | TEMPERATURE: 97 F | SYSTOLIC BLOOD PRESSURE: 171 MMHG | OXYGEN SATURATION: 97 % | RESPIRATION RATE: 20 BRPM

## 2025-08-03 NOTE — ED PROVIDER NOTES
Encounter Date: 8/2/2025       History     Chief Complaint   Patient presents with    Fall     Pt reports fall in the shower and hit his back of the head and left arm, confused after the incident about and half an hour ago PTA.      89-year-old male with a history of coronary artery disease, basal cell carcinoma, hypertension presenting with mechanical fall in the shower.  Patient reports he was wearing slippers that got caught on the door way.  Reports he fell in the shower hitting the back of his head.  Also notes minimal abrasion to left arm.  Denies loss of consciousness, chest pain, shortness of breath, dizziness, nausea, vomiting, changes in vision.  Denies numbness, weakness, back pain, neck pain.  Patient states he came because his family wanted to get him checked out.      Review of patient's allergies indicates:   Allergen Reactions    Iodine and iodide containing products Anaphylaxis    Dye Other (See Comments)    Iodinated contrast media     Trazodone      dizziness    Adhesive Other (See Comments)     rash    Keflex [cephalexin] Diarrhea and Nausea And Vomiting    Penicillins Rash    Tylenol [acetaminophen] Other (See Comments)     Triggers PTSD/nightmares     Past Medical History:   Diagnosis Date    Allergy     seasonal    Arthritis     Basal cell carcinoma 10 years    right ear     Basal cell carcinoma 08/24/2017    Left earlobe     Basal cell carcinoma 11/14/2020    Left wrist    Basal cell carcinoma 05/18/2020    left nasal tip    CAD (coronary artery disease) 11/29/2012    Hyperlipidemia     Hypertension     Joint pain     PTSD (post-traumatic stress disorder)     SCC (squamous cell carcinoma) 12/12/2022    L dorsal hand-ED&C    Squamous cell carcinoma 06/26/2017    Squamous cell carcinoma of skin 10/2019    right cheek (cryosurgery)      Past Surgical History:   Procedure Laterality Date    CATARACT EXTRACTION      CORONARY ARTERY BYPASS GRAFT       Family History   Problem Relation Name Age of  Onset    Skin cancer Mother      Leukemia Father      Diabetes Sister      Peripheral vascular disease Sister      Cancer Brother      No Known Problems Daughter      No Known Problems Daughter      No Known Problems Daughter      No Known Problems Daughter      No Known Problems Son      No Known Problems Son      Melanoma Neg Hx       Social History[1]  Review of Systems   Constitutional:  Negative for fever.   Respiratory:  Negative for shortness of breath.    Cardiovascular:  Negative for chest pain.   Gastrointestinal:  Negative for nausea and vomiting.   Genitourinary:  Negative for dysuria.   Musculoskeletal:  Negative for back pain and neck pain.   Skin:  Negative for rash.   Neurological:  Negative for dizziness, weakness, numbness and headaches.       Physical Exam     Initial Vitals [08/02/25 2058]   BP Pulse Resp Temp SpO2   (!) 182/52 82 15 97.4 °F (36.3 °C) 96 %      MAP       --         Physical Exam    Nursing note and vitals reviewed.  Constitutional: He appears well-developed and well-nourished. He is not diaphoretic. No distress.   HENT:   Head: Normocephalic and atraumatic.   Eyes: Conjunctivae and EOM are normal. Pupils are equal, round, and reactive to light. No scleral icterus.   Neck: Neck supple.   Normal range of motion.  Cardiovascular:  Normal rate, regular rhythm, normal heart sounds and intact distal pulses.     Exam reveals no gallop and no friction rub.       No murmur heard.  Pulmonary/Chest: Breath sounds normal. No stridor. No respiratory distress. He has no wheezes. He has no rhonchi. He has no rales.   Abdominal: Abdomen is soft. Bowel sounds are normal. He exhibits no distension. There is no abdominal tenderness. There is no rebound and no guarding.   Musculoskeletal:         General: No tenderness or edema. Normal range of motion.      Cervical back: Normal range of motion and neck supple.     Neurological: He is alert and oriented to person, place, and time. He has normal  strength. No cranial nerve deficit.   Skin: Skin is warm and dry. No rash noted.   Psychiatric: He has a normal mood and affect. His behavior is normal.         ED Course   Procedures  Labs Reviewed - No data to display       Imaging Results              CT Cervical Spine Without Contrast (Final result)  Result time 08/02/25 21:55:49      Final result by Kalen Lin MD (08/02/25 21:55:49)                   Impression:    IMPRESSION:  1.  No acute intracranial abnormality identified.  2.  No acute abnormality identified in the cervical spine.    -Electronically Signed By: Kalen Lin MD   -Electronically Signed On:  8/2/2025 9:55 PM      Report Ends               Narrative:    EXAM: CT HEAD WITHOUT CONTRAST, CT CERVICAL SPINE WITHOUT CONTRAST    HISTORY: 89 years-old Male with fall    TECHNIQUE: Axial CT of the head and cervical spine with multiplanar reformats. All CT scans are performed using dose optimization techniques as appropriate to a performed exam including automated exposure control and/or standardized protocols for targeted exams where dose is matched to indication/reason for exam/patient size.    COMPARISON: None    FINDINGS:     HEAD:    Mild age related brain parenchymal volume loss with mild periventricular white matter hypodensity compatible with chronic microvascular ischemic change. The brain and ventricles are otherwise unremarkable in appearance without evidence of hemorrhage, hydrocephalus, midline shift, mass effect, or herniation. Intracranial atherosclerosis.    The calvarium and cranial base are unremarkable. The mastoid air cells are clear.    The visualized paranasal sinuses are clear. Bilateral lens replacements.    CERVICAL SPINE:    No acute fracture identified. No traumatic subluxation.   The cervical lordosis is maintained.  The craniocervical junction is intact.   Multilevel disc and facet degeneration with small spurs.  No definite high-grade spinal canal narrowing.  Multilevel  foraminal narrowing most severe on the left C3-4 and on the left C5-6.   The paraspinal soft tissues are unremarkable.                                       CT Head Without Contrast (Final result)  Result time 08/02/25 21:55:49      Final result by Kalen Lin MD (08/02/25 21:55:49)                   Impression:    IMPRESSION:  1.  No acute intracranial abnormality identified.  2.  No acute abnormality identified in the cervical spine.    -Electronically Signed By: Kalen Lin MD   -Electronically Signed On:  8/2/2025 9:55 PM      Report Ends               Narrative:    EXAM: CT HEAD WITHOUT CONTRAST, CT CERVICAL SPINE WITHOUT CONTRAST    HISTORY: 89 years-old Male with fall    TECHNIQUE: Axial CT of the head and cervical spine with multiplanar reformats. All CT scans are performed using dose optimization techniques as appropriate to a performed exam including automated exposure control and/or standardized protocols for targeted exams where dose is matched to indication/reason for exam/patient size.    COMPARISON: None    FINDINGS:     HEAD:    Mild age related brain parenchymal volume loss with mild periventricular white matter hypodensity compatible with chronic microvascular ischemic change. The brain and ventricles are otherwise unremarkable in appearance without evidence of hemorrhage, hydrocephalus, midline shift, mass effect, or herniation. Intracranial atherosclerosis.    The calvarium and cranial base are unremarkable. The mastoid air cells are clear.    The visualized paranasal sinuses are clear. Bilateral lens replacements.    CERVICAL SPINE:    No acute fracture identified. No traumatic subluxation.   The cervical lordosis is maintained.  The craniocervical junction is intact.   Multilevel disc and facet degeneration with small spurs.  No definite high-grade spinal canal narrowing.  Multilevel foraminal narrowing most severe on the left C3-4 and on the left C5-6.   The paraspinal soft tissues are  unremarkable.                                       Medications - No data to display  Medical Decision Making                         Medical Decision Making:   Initial Assessment:   89-year-old male presenting with mechanical fall.  On exam he is well-appearing and in no acute distress.  Vitals with minimal hypertension.  He denies significant complaints.  Small skin tear to left arm, otherwise relatively superficial.  Small hematoma to back of head, no other skin injury.  No C-spine tenderness or pain.  Neuro exam unremarkable.  CT head neck without acute findings.  Suspect small contusion.  Otherwise believe he is safe for discharge home.  EKG nonischemic without evidence of arrhythmia.  Overall suspect mechanical fall with small contusion.  Believe he is safe for discharge home.  Has follow-up with primary care on Monday.  Discussed return precautions.                Clinical Impression:  Final diagnoses:  [W19.XXXA] Fall  [S00.03XA] Contusion of scalp, initial encounter (Primary)          ED Disposition Condition    Discharge Stable          ED Prescriptions    None       Follow-up Information       Follow up With Specialties Details Why Contact Deshaun Rodriguez MD Internal Medicine Schedule an appointment as soon as possible for a visit   [address] [facility] [address] [phone]      [facility] ED Emergency Medicine  As needed, If symptoms worsen [address] [facility] [address] [phone]                   [1]   Social History  Tobacco Use    Smoking status: Former    Smokeless tobacco: Never   Substance Use Topics    Alcohol use: Yes     Comment: occasionally    Drug use: No        Jacobo Caraballo MD  08/02/25 [id number]

## 2025-08-03 NOTE — DISCHARGE INSTRUCTIONS
You were seen in the emergency department after a fall.  Your exam, and CT scan is reassuring.  We do not believe you have any serious injury at this time.  You may have a bruise or mild contusion.  You may feel slightly worse tomorrow.  Take anti-inflammatories or over-the-counter medication as needed.  Please follow-up for any new or worsening pain, swelling, fevers, chills, chest pain, difficulty breathing, nausea, vomiting, abdominal pain, or you become concerned in any other way.

## 2025-08-04 LAB
OHS QRS DURATION: 106 MS
OHS QTC CALCULATION: 466 MS

## 2025-08-26 ENCOUNTER — OFFICE VISIT (OUTPATIENT)
Dept: DERMATOLOGY | Facility: CLINIC | Age: OVER 89
End: 2025-08-26
Payer: MEDICARE

## 2025-08-26 DIAGNOSIS — R23.8 VENOUS LAKE OF LIP: ICD-10-CM

## 2025-08-26 DIAGNOSIS — D23.9 DILATED PORE OF WINER: ICD-10-CM

## 2025-08-26 DIAGNOSIS — Z85.828 HISTORY OF NONMELANOMA SKIN CANCER: ICD-10-CM

## 2025-08-26 DIAGNOSIS — L82.1 SK (SEBORRHEIC KERATOSIS): Primary | ICD-10-CM

## 2025-08-26 PROCEDURE — 99213 OFFICE O/P EST LOW 20 MIN: CPT | Mod: S$PBB,,, | Performed by: DERMATOLOGY

## 2025-08-26 PROCEDURE — 99999 PR PBB SHADOW E&M-EST. PATIENT-LVL III: CPT | Mod: PBBFAC,,, | Performed by: DERMATOLOGY

## 2025-08-26 PROCEDURE — 99213 OFFICE O/P EST LOW 20 MIN: CPT | Mod: PBBFAC | Performed by: DERMATOLOGY
